# Patient Record
Sex: MALE | Race: WHITE | NOT HISPANIC OR LATINO | Employment: FULL TIME | URBAN - METROPOLITAN AREA
[De-identification: names, ages, dates, MRNs, and addresses within clinical notes are randomized per-mention and may not be internally consistent; named-entity substitution may affect disease eponyms.]

---

## 2017-03-13 ENCOUNTER — ALLSCRIPTS OFFICE VISIT (OUTPATIENT)
Dept: OTHER | Facility: OTHER | Age: 47
End: 2017-03-13

## 2017-03-13 DIAGNOSIS — D69.6 THROMBOCYTOPENIA (HCC): ICD-10-CM

## 2017-03-13 DIAGNOSIS — I10 ESSENTIAL (PRIMARY) HYPERTENSION: ICD-10-CM

## 2017-03-13 DIAGNOSIS — I25.10 ATHEROSCLEROTIC HEART DISEASE OF NATIVE CORONARY ARTERY WITHOUT ANGINA PECTORIS: ICD-10-CM

## 2017-03-13 DIAGNOSIS — M25.551 PAIN IN RIGHT HIP: ICD-10-CM

## 2017-03-13 DIAGNOSIS — E78.5 HYPERLIPIDEMIA: ICD-10-CM

## 2017-03-21 ENCOUNTER — TRANSCRIBE ORDERS (OUTPATIENT)
Dept: ADMINISTRATIVE | Facility: HOSPITAL | Age: 47
End: 2017-03-21

## 2017-03-21 ENCOUNTER — HOSPITAL ENCOUNTER (OUTPATIENT)
Dept: RADIOLOGY | Facility: HOSPITAL | Age: 47
Discharge: HOME | End: 2017-03-21
Payer: COMMERCIAL

## 2017-03-21 DIAGNOSIS — M25.551 PAIN IN RIGHT HIP: ICD-10-CM

## 2017-03-21 PROCEDURE — 73521 X-RAY EXAM HIPS BI 2 VIEWS: CPT

## 2017-03-22 ENCOUNTER — APPOINTMENT (OUTPATIENT)
Dept: PHYSICAL THERAPY | Facility: CLINIC | Age: 47
End: 2017-03-22
Payer: COMMERCIAL

## 2017-03-22 ENCOUNTER — GENERIC CONVERSION - ENCOUNTER (OUTPATIENT)
Dept: OTHER | Facility: OTHER | Age: 47
End: 2017-03-22

## 2017-03-22 DIAGNOSIS — M25.551 PAIN IN RIGHT HIP: ICD-10-CM

## 2017-03-22 PROCEDURE — 97162 PT EVAL MOD COMPLEX 30 MIN: CPT

## 2017-03-22 PROCEDURE — 97110 THERAPEUTIC EXERCISES: CPT

## 2017-04-03 ENCOUNTER — APPOINTMENT (OUTPATIENT)
Dept: PHYSICAL THERAPY | Facility: CLINIC | Age: 47
End: 2017-04-03
Payer: COMMERCIAL

## 2017-04-03 PROCEDURE — 97140 MANUAL THERAPY 1/> REGIONS: CPT

## 2017-04-03 PROCEDURE — 97110 THERAPEUTIC EXERCISES: CPT

## 2017-04-05 ENCOUNTER — APPOINTMENT (OUTPATIENT)
Dept: PHYSICAL THERAPY | Facility: CLINIC | Age: 47
End: 2017-04-05
Payer: COMMERCIAL

## 2017-04-05 PROCEDURE — 97140 MANUAL THERAPY 1/> REGIONS: CPT

## 2017-04-05 PROCEDURE — 97110 THERAPEUTIC EXERCISES: CPT

## 2017-04-10 ENCOUNTER — APPOINTMENT (OUTPATIENT)
Dept: PHYSICAL THERAPY | Facility: CLINIC | Age: 47
End: 2017-04-10
Payer: COMMERCIAL

## 2017-04-10 PROCEDURE — 97110 THERAPEUTIC EXERCISES: CPT

## 2017-04-10 PROCEDURE — 97140 MANUAL THERAPY 1/> REGIONS: CPT

## 2017-04-21 ENCOUNTER — ALLSCRIPTS OFFICE VISIT (OUTPATIENT)
Dept: OTHER | Facility: OTHER | Age: 47
End: 2017-04-21

## 2017-04-21 ENCOUNTER — TRANSCRIBE ORDERS (OUTPATIENT)
Dept: ADMINISTRATIVE | Facility: HOSPITAL | Age: 47
End: 2017-04-21

## 2017-04-21 DIAGNOSIS — S86.811A RUPTURE, TENDON, PATELLAR, RIGHT, INITIAL ENCOUNTER: ICD-10-CM

## 2017-04-21 DIAGNOSIS — S86.811A STRAIN OF OTHER MUSCLE(S) AND TENDON(S) AT LOWER LEG LEVEL, RIGHT LEG, INITIAL ENCOUNTER: ICD-10-CM

## 2017-04-21 DIAGNOSIS — M79.89 SWELLING OF LIMB: Primary | ICD-10-CM

## 2017-04-21 DIAGNOSIS — M79.89 OTHER DISORDERS OF SOFT TISSUE: ICD-10-CM

## 2017-04-26 ENCOUNTER — HOSPITAL ENCOUNTER (OUTPATIENT)
Dept: RADIOLOGY | Facility: HOSPITAL | Age: 47
Discharge: HOME | End: 2017-04-26
Attending: FAMILY MEDICINE
Payer: COMMERCIAL

## 2017-04-26 DIAGNOSIS — M79.89 SWELLING OF LIMB: ICD-10-CM

## 2017-04-26 DIAGNOSIS — S86.811A RUPTURE, TENDON, PATELLAR, RIGHT, INITIAL ENCOUNTER: ICD-10-CM

## 2017-04-26 PROCEDURE — 76881 US COMPL JOINT R-T W/IMG: CPT

## 2017-05-08 ENCOUNTER — ALLSCRIPTS OFFICE VISIT (OUTPATIENT)
Dept: OTHER | Facility: OTHER | Age: 47
End: 2017-05-08

## 2017-09-18 ENCOUNTER — ALLSCRIPTS OFFICE VISIT (OUTPATIENT)
Dept: OTHER | Facility: OTHER | Age: 47
End: 2017-09-18

## 2017-09-18 ENCOUNTER — APPOINTMENT (OUTPATIENT)
Dept: LAB | Facility: CLINIC | Age: 47
End: 2017-09-18
Payer: COMMERCIAL

## 2017-09-18 DIAGNOSIS — E78.5 HYPERLIPIDEMIA: ICD-10-CM

## 2017-09-18 DIAGNOSIS — I10 ESSENTIAL (PRIMARY) HYPERTENSION: ICD-10-CM

## 2017-09-18 DIAGNOSIS — I25.10 ATHEROSCLEROTIC HEART DISEASE OF NATIVE CORONARY ARTERY WITHOUT ANGINA PECTORIS: ICD-10-CM

## 2017-09-18 DIAGNOSIS — M76.61 ACHILLES TENDINITIS OF RIGHT LOWER EXTREMITY: ICD-10-CM

## 2017-09-18 LAB
ALBUMIN SERPL BCP-MCNC: 3.7 G/DL (ref 3.5–5)
ALP SERPL-CCNC: 60 U/L (ref 46–116)
ALT SERPL W P-5'-P-CCNC: 34 U/L (ref 12–78)
ANION GAP SERPL CALCULATED.3IONS-SCNC: 9 MMOL/L (ref 4–13)
AST SERPL W P-5'-P-CCNC: 24 U/L (ref 5–45)
BASOPHILS # BLD AUTO: 0.01 THOUSANDS/ΜL (ref 0–0.1)
BASOPHILS NFR BLD AUTO: 0 % (ref 0–1)
BILIRUB SERPL-MCNC: 0.47 MG/DL (ref 0.2–1)
BUN SERPL-MCNC: 12 MG/DL (ref 5–25)
CALCIUM SERPL-MCNC: 8.8 MG/DL (ref 8.3–10.1)
CHLORIDE SERPL-SCNC: 107 MMOL/L (ref 100–108)
CHOLEST SERPL-MCNC: 141 MG/DL (ref 50–200)
CO2 SERPL-SCNC: 24 MMOL/L (ref 21–32)
CREAT SERPL-MCNC: 0.94 MG/DL (ref 0.6–1.3)
EOSINOPHIL # BLD AUTO: 0.08 THOUSAND/ΜL (ref 0–0.61)
EOSINOPHIL NFR BLD AUTO: 1 % (ref 0–6)
ERYTHROCYTE [DISTWIDTH] IN BLOOD BY AUTOMATED COUNT: 12.8 % (ref 11.6–15.1)
GFR SERPL CREATININE-BSD FRML MDRD: 96 ML/MIN/1.73SQ M
GLUCOSE P FAST SERPL-MCNC: 101 MG/DL (ref 65–99)
HCT VFR BLD AUTO: 42.5 % (ref 36.5–49.3)
HDLC SERPL-MCNC: 38 MG/DL (ref 40–60)
HGB BLD-MCNC: 14.8 G/DL (ref 12–17)
LDLC SERPL CALC-MCNC: 76 MG/DL (ref 0–100)
LYMPHOCYTES # BLD AUTO: 1.31 THOUSANDS/ΜL (ref 0.6–4.47)
LYMPHOCYTES NFR BLD AUTO: 20 % (ref 14–44)
MCH RBC QN AUTO: 31 PG (ref 26.8–34.3)
MCHC RBC AUTO-ENTMCNC: 34.8 G/DL (ref 31.4–37.4)
MCV RBC AUTO: 89 FL (ref 82–98)
MONOCYTES # BLD AUTO: 0.61 THOUSAND/ΜL (ref 0.17–1.22)
MONOCYTES NFR BLD AUTO: 10 % (ref 4–12)
NEUTROPHILS # BLD AUTO: 4.42 THOUSANDS/ΜL (ref 1.85–7.62)
NEUTS SEG NFR BLD AUTO: 69 % (ref 43–75)
NRBC BLD AUTO-RTO: 0 /100 WBCS
PLATELET # BLD AUTO: 126 THOUSANDS/UL (ref 149–390)
PMV BLD AUTO: 12.1 FL (ref 8.9–12.7)
POTASSIUM SERPL-SCNC: 4.1 MMOL/L (ref 3.5–5.3)
PROT SERPL-MCNC: 7.9 G/DL (ref 6.4–8.2)
RBC # BLD AUTO: 4.78 MILLION/UL (ref 3.88–5.62)
SODIUM SERPL-SCNC: 140 MMOL/L (ref 136–145)
TRIGL SERPL-MCNC: 134 MG/DL
TSH SERPL DL<=0.05 MIU/L-ACNC: 2.75 UIU/ML (ref 0.36–3.74)
WBC # BLD AUTO: 6.44 THOUSAND/UL (ref 4.31–10.16)

## 2017-09-18 PROCEDURE — 80061 LIPID PANEL: CPT

## 2017-09-18 PROCEDURE — 85025 COMPLETE CBC W/AUTO DIFF WBC: CPT

## 2017-09-18 PROCEDURE — 80053 COMPREHEN METABOLIC PANEL: CPT

## 2017-09-18 PROCEDURE — 84443 ASSAY THYROID STIM HORMONE: CPT

## 2017-09-18 PROCEDURE — 36415 COLL VENOUS BLD VENIPUNCTURE: CPT

## 2017-10-26 NOTE — PROGRESS NOTES
Assessment  1  Arteriosclerotic cardiovascular disease (429 2,440 9) (I25 10)   2  Hyperlipidemia (272 4) (E78 5)   3  Hypertension (401 9) (I10)   4  Achilles tendinitis of right lower extremity (726 71) (M76 61)   5  Immunization due (V05 9) (Z23)    Plan   Immunization due    · Fluzone Quadrivalent Intramuscular Suspension    (1) CBC/PLT/DIFF; Status:Resulted - Requires Verification;   Done: 11IOT3079 12:00AM  Due:47Gat5639; Ordered; For:Achilles tendinitis of right lower extremity, Arteriosclerotic cardiovascular disease, Hyperlipidemia, Hypertension; Ordered By:Jurgen Mcqueen;   (1) COMPREHENSIVE METABOLIC PANEL; Status:Resulted - Requires Verification;   Done: 26YSC0090 12:00AM  Due:74Kkt4787; Ordered; For:Achilles tendinitis of right lower extremity, Arteriosclerotic cardiovascular disease, Hyperlipidemia, Hypertension; Ordered By:Jurgen Mcqueen;   (1) LIPID PANEL, FASTING; Status:Resulted - Requires Verification;   Done: 70JJE1657 12:00AM  Due:78Lll5179; Ordered; For:Achilles tendinitis of right lower extremity, Arteriosclerotic cardiovascular disease, Hyperlipidemia, Hypertension; Ordered By:Jurgen Mcqueen;   (1) TSH; Status:Resulted - Requires Verification;   Done: 32QWM1504 12:00AM  Due:05Yyy9186; Ordered; For:Achilles tendinitis of right lower extremity, Arteriosclerotic cardiovascular disease, Hyperlipidemia, Hypertension; Ordered By:Jurgen Mcqueen;      Discussion/Summary    #1 CAD - s/p CABG  patient doing well  Check labs  Continue follow-up with cardiology  Recheck 6 months  HTN - stable  Check labs  Recheck as abovehyperlipidemia - check labs as above  R Achilles insertion tendinitis - I reviewed with patient  We discussed care  He will try warming up before playing tennis and icing afterwards  Tylenol for pain  Consider podiatry orthopedic eval if not improving  Recheck 2-3 weeks if not improvingHM - I reviewed HM issues with pt  flu shot given   Check labs as above  Recheck 6 months  Patient to call for problems or concerns in the interim  The patient was counseled regarding instructions for management,-- risk factor reductions,-- prognosis,-- patient and family education,-- impressions,-- risks and benefits of treatment options,-- importance of compliance with treatment  Possible side effects of new medications were reviewed with the patient/guardian today  The treatment plan was reviewed with the patient/guardian  The patient/guardian understands and agrees with the treatment plan      Chief Complaint  6 MONTH WELL CHECK - HTN , HYPERLIPIDEMIA   Patient is here today for follow up of chronic conditions described in HPI  History of Present Illness  as abovept doing well  Pt states that he is handling his anxiety and work stress better  Has started exercising more which had helped  Pt did suffer a gastroc injury earlier this year that improved with conservative therapy  Pt now has R achilles insertion area pain  Started after playing tennis  (+) swelling initially that has slowly improved but has not resolved  no CP, palp, lightheadedness or other CV symptoms with or without exertion (plays tennis regularly)   Last seen by Cardio 11/15  Overdue for labsR anterior hip pain better with PT  weight is stable but still struggles with diet due to travel  Review of Systems    Constitutional: No fever or chills, feels well, no tiredness, no recent weight gain or weight loss  Eyes: No complaints of eye pain, no red eyes, no discharge from eyes, no itchy eyes  ENT: no complaints of earache, no hearing loss, no nosebleeds, no nasal discharge, no sore throat, no hoarseness  Cardiovascular: as noted in HPI  Respiratory: No complaints of shortness of breath, no wheezing, no cough, no SOB on exertion, no orthopnea or PND  Gastrointestinal: No complaints of abdominal pain, no constipation, no nausea or vomiting, no diarrhea or bloody stools  Genitourinary: No complaints of dysuria, no incontinence, no hesitancy, no nocturia, no genital lesion, no testicular pain  Musculoskeletal: as noted in HPI  Integumentary: No complaints of skin rash or skin lesions, no itching, no skin wound, no dry skin  Neurological: No compliants of headache, no confusion, no convulsions, no numbness or tingling, no dizziness or fainting, no limb weakness, no difficulty walking  Psychiatric: anxiety, but-- as noted in HPI,-- not suicidal-- and-- no depression  Endocrine: No complaints of proptosis, no hot flashes, no muscle weakness, no erectile dysfunction, no deepening of the voice, no feelings of weakness  Hematologic/Lymphatic: No complaints of swollen glands, no swollen glands in the neck, does not bleed easily, no easy bruising  Over the past 2 weeks, how often have you been bothered by the following problems? 1 ) Little interest or pleasure in doing things? Several days  2 ) Feeling down, depressed or hopeless? Not at all    3 ) Trouble falling asleep or sleeping too much? Several days  4 ) Feeling tired or having little energy? Not at all    5 ) Poor appetite or overeating? Half the days or more  6 ) Feeling bad about yourself, or that you are a failure, or have let yourself or your family down? Several days  7 ) Trouble concentrating on things, such as reading a newspaper or watching television? Half the days or more  8 ) Moving or speaking so slowly that other people could have noticed, or the opposite, moving or speaking faster than usual? Not at all    9 ) Thoughts that you would be better off dead or of hurting yourself in some way? Not at all  Score 7      Active Problems  1  Allergic conjunctivitis of both eyes (372 14) (H10 13)   2  Arteriosclerotic cardiovascular disease (429 2,440 9) (I25 10)   3  Bruising (924 9) (T14 8)   4  CABG   5  Calf swelling (729 81) (M79 89)   6  Chest pain (786 50) (R07 9)   7   Dyspnea, unspecified dyspnea   8  Encounter for vasectomy (V25 2) (Z30 2)   9  Gastrocnemius tendon rupture, right, initial encounter (844 8) (R42 964Z)   10  Heart disease (429 9) (I51 9)   11  Hyperlipidemia (272 4) (E78 5)   12  Hypertension (401 9) (I10)   13  Insomnia (780 52) (G47 00)   14  Pain of foot and toes (729 5) (M79 673)   15  Pain of toe of right foot (729 5) (M79 674)   16  Right hip pain (719 45) (M25 551)   17  Screening and evaluation for vasectomy (V72 83) (Z30 09)   18  Sterilization consult (V25 09) (Z30 09)   19  Strain of calf muscle, right, initial encounter (844 8) (S86 811A)   20  Thrombocytopenia (287 5) (D69 6)    Past Medical History  1  History of Acute angina (413 9) (I20 9)   2  History of High blood pressure (not hypertension) (796 2) (R03 0)   3  History of valvular heart disease (V12 59) (Z86 79)   4  History of Irregular heartbeat (427 9) (I49 9)   5  History of ST elevation myocardial infarction (STEMI), unspecified artery (410 90) (I21 3)    The active problems and past medical history were reviewed and updated today  Surgical History  1  CABG   2  History of CABG   3  History of Cardiac Cath Procedure Outcome:   4  History of Coronary Angiography Without Concomitant Left Heart Catheterization   5  History of Heart Surgery   6  History of Surgery Vas Deferens Vasectomy    Family History  Mother    1  Family history of Coronary Artery Disease (V17 49)   2  Family history of Heart Disease (V17 49)   3  Family history of Hypertension (V17 49)    Social History   · Being A Social Drinker   · Denied: History of Daily Coffee Consumption (___ Cups/Day)   · Denied: History of Daily Cola Consumption (___ Cans/Day)   · Daily Tea Consumption (___ Cups/Day)   · Marital History - Currently    · Never A Smoker   · Occupation:   · Sexually active  The social history was reviewed and updated today  Current Meds   1  Aspirin Childrens 81 MG Oral Tablet Chewable; Take 1 tablet daily;    Therapy: (Recorded:09Nov2015) to Recorded   2  Bystolic 5 MG Oral Tablet; Take one tab daily; [facility] 59GPO8557 [id number]  Requested for: 34REB0355; Last   Rx:17Jan2017 Ordered   3  Loratadine 10 MG Oral Tablet; TAKE 1 TABLET DAILY AS NEEDED; Therapy: 36JQX6020 to (Evaluate:41Vza2566)  Requested for: 45ZTO0398; Last   Rx:13Mar2017 Ordered   4  Rosuvastatin Calcium 20 MG Oral Tablet; TAKE 1 TABLET DAILY  Requested for:   83LDV4397; Last Rx:17Jan2017 Ordered   5  Zolpidem Tartrate 10 MG Oral Tablet; take 1/2 TO 1 tablet by mouth at bedtime; Therapy: 52MPL1521 to (Evaluate:16Oct2017)  Requested for: 50YHP2172; Last   Rx:26Isz8620 Ordered    The medication list was reviewed and updated today  Allergies  1  No Known Drug Allergies    Vitals  Vital Signs    Recorded: 18Sep2017 08:07AM   Temperature 97 7 F   Heart Rate 68   Respiration 16   Systolic 343   Diastolic 78   Height 6 ft 2 in   Weight 258 lb 4 oz   BMI Calculated 33 16   BSA Calculated 2 42     Physical Exam    Constitutional   General appearance: No acute distress, well appearing and well nourished  Head and Face   Head and face: Normal     Eyes   Conjunctiva and lids: No erythema, swelling or discharge  Pupils and irises: Equal, round, reactive to light  Ophthalmoscopic examination: Normal fundi and optic discs  Ears, Nose, Mouth, and Throat   External inspection of ears and nose: Normal     Otoscopic examination: Tympanic membranes translucent with normal light reflex  Canals patent without erythema  Nasal mucosa, septum, and turbinates: Normal without edema or erythema  Lips, teeth, and gums: Normal, good dentition  Oropharynx: Normal with no erythema, edema, exudate or lesions  Neck   Neck: Supple, symmetric, trachea midline, no masses  Thyroid: Normal, no thyromegaly  Pulmonary   Respiratory effort: No increased work of breathing or signs of respiratory distress      Auscultation of lungs: Clear to auscultation  Cardiovascular   Auscultation of heart: Normal rate and rhythm, normal S1 and S2, no murmurs  Carotid pulses: 2+ bilaterally  Abdominal aorta: Normal     Pedal pulses: 2+ bilaterally  Peripheral vascular exam: Normal     Examination of extremities for edema and/or varicosities: Normal     Abdomen   Abdomen: Non-tender, no masses  Liver and spleen: No hepatomegaly or splenomegaly  Lymphatic   Palpation of lymph nodes in neck: No lymphadenopathy  Palpation of lymph nodes in other areas: No lymphadenopathy  Musculoskeletal   Gait and station: Normal     Inspection/palpation of digits and nails: Normal without clubbing or cyanosis  Inspection/palpation of joints, bones, and muscles: Abnormal  -- mild TTP over the insertion of the R achilles tendon insertion  mild bogginess  Muscle strength/tone: Normal     Skin   Skin and subcutaneous tissue: Normal without rashes or lesions  Neurologic   Cranial nerves: Cranial nerves 2-12 intact  Cortical function: Normal mental status  Reflexes: 2+ and symmetric  Sensation: No sensory loss  Psychiatric   Mood and affect: Normal        Results/Data  PHQ-2 Adult Depression Screening 04Riy4327 02:12PM User, [facility]     Test Name Result Flag Reference   PHQ-2 Adult Depression Score 0     Over the last two weeks, how often have you been bothered by any of the following problems? Little interest or pleasure in doing things: Not at all - 0  Feeling down, depressed, or hopeless: Not at all - 0   PHQ-2 Adult Depression Screening Negative         Future Appointments    Date/Time Provider Specialty Site   03/27/2018 10:15 AM MINGO Barry   [facility]     Signatures   Electronically signed by : MINGO Kaiser ; Sep 18 2017  8:02PM EST                       (Author)

## 2017-11-17 ENCOUNTER — ALLSCRIPTS OFFICE VISIT (OUTPATIENT)
Dept: OTHER | Facility: OTHER | Age: 47
End: 2017-11-17

## 2017-11-18 NOTE — CONSULTS
Assessment    1  CABG  2  Hyperlipidemia (272 4) (E78 5)  3  Hypertension (401 9) (I10)  4  Chest pain (786 50) (R07 9)  5  Pre-operative cardiovascular examination (V72 81) (Z01 810)  6  Arteriosclerotic cardiovascular disease (429 2,440 9) (I25 10)    Plan  Arteriosclerotic cardiovascular disease, Chest pain, Hyperlipidemia, Hypertension,Pre-operative cardiovascular examination    · (1) COMPREHENSIVE METABOLIC PANEL; Status:Hold For - Exact Date; Requestedfor:With next appointment;   Perform:[facility]; OMS:37ACZ3938; Ordered; For:Arteriosclerotic cardiovascular disease, Chest pain, Hyperlipidemia, Hypertension, Pre-operative cardiovascular examination; Ordered By:Jurgen Her;   · (1) LIPID PANEL FASTING W DIRECT LDL REFLEX; Status:Hold For - Exact Date; Requested for: With next appointment;   Perform:[facility]; Due:17Nov2018; Ordered; For:Arteriosclerotic cardiovascular disease, Chest pain, Hyperlipidemia, Hypertension, Pre-operative cardiovascular examination; Ordered By:Jurgen Her;   · Follow-up visit in 1 year Evaluation and Treatment  Follow-up  Status: Complete  Done:17Nov2017  Ordered; For: Arteriosclerotic cardiovascular disease, Chest pain, Hyperlipidemia, Hypertension, Pre-operative cardiovascular examination;  Ordered By: Roz Zelaya  Performed:   Due: 98VCW9111; Last Updated By: Yash Ring; 11/17/2017 11:53:14 AM    Discussion/Summary    1  preoperative clearance for right partial calcanectomy and secondary insertional achilles, low risk, no cardiac contraindication  CAD, CABG x 4 2/2012 (SVG to d1, svg to om3, Radial to RI, LIMA to LAD)normal lvefnuclear stress 2015, no ischemia  hyperlipidemia, controlled on statin  hypertension, controlled  Patient doing well clinically  Unable to exercise due to heel spur and plans on having surgery beginning of December  As detailed, no cardiac contraindication would proceed as planned   Routine follow-up recommended  Hopefully sensible diet and exercise can be continued after he heals from his surgery  No med changes required    Patient can stop aspirin if needed prior to surgery for 5 days  The patient was counseled regarding instructions for management,-- prognosis  total time of encounter was 25 minutes  1  Coronary Artery Disease, atypical and typical chest pain, improved last month  Coronary artery bypass grafting x4, February 29, 2012; (LIMA -LAD; saphenous vein graft to the first diagonal branch; saphenous vein graft to the obtuse marginal third; free radial artery to the ramus  Nuclear stress test 2/2015, normal erfusion, normal LVEF, echo also nonrevealing  Hypercholesterolemia/hypertension, controlled LDL 60-76       History of Present Illness  Cardiology HPI Free Text Note Form [facility]: Patient here in follow-up known coronary artery disease   had coronary bypass grafting x4 2012  Had been asymptomatic since his index event in 2012 when he presented with unstable angina  stress test in 2015 performed for atypical chest discomfort which showed no evidence of ischemia good cardiac workload   has not been here for approximately 2 years  He has developed a heel spur which requires surgical removal and therefore has requested preoperative cardiac clearance as result in consultation  states that he did start playing tennis over the summer which relieved his anxiety which he has had for years  this resulted in heel injury and then had not been able to exercise too much thereafter  has been maintained on low-dose bystolic, statin and aspirin therapy as a simplified regimen for his coronary artery disease and hyperlipidemia  Blood pressure lipids have been well controlled  times, he has an occasional ache in his lower off upper chest which she describes as a bruise  He touches it at times it seems to get worse  It is not exertional  It comes and goes and has been doing so for years        Review of Systems     Cardiac: No complaints of chest pain, no palpitations, no fainiting -- and-- as noted in HPI  Skin: No complaints of nonhealing sores or skin rash  Genitourinary: No complaints of recurrent urinary tract infections, frequent urination at night, difficult urination, blood in urine, kidney stones, loss of bladder control, no kidney or prostate problems, no erectile dysfunction  Psychological: No complaints of feeling depressed, anxiety, panic attacks, or difficulty concentrating  General: No complaints of trouble sleeping, lack of energy, fatigue, appetite changes, weight changes, fever, frequent infections, or night sweats  Respiratory: No complaints of shortness of breath, cough with sputum, or wheezing  HEENT: No complaints of serious problems, hearing problems, nose problems, throat problems, or snoring  Gastrointestinal: No complaints of liver problems, nausea, vomiting, heartburn, constipation, bloody stools, diarrhea, problems swallowing, adbominal pain, or rectal bleeding  Hematologic: No complaints of bleeding disorders, anemia, blood clots, or excessive brusing  Neurological: No complaints of numbness, tingling, dizziness, weakness, seizures, headaches, syncope or fainting, AM fatigue, daytime sleepiness, no witnessed apnea episodes  Musculoskeletal: No complaints of arthritis, back pain, or painfull swelling  Active Problems  1  Achilles tendinitis of right lower extremity (726 71) (M76 61)  2  Allergic conjunctivitis of both eyes (372 14) (H10 13)  3  Arteriosclerotic cardiovascular disease (429 2,440 9) (I25 10)  4  Bruising (924 9) (T14 8)  5  CABG  6  Calf swelling (729 81) (M79 89)  7  Chest pain (786 50) (R07 9)  8  Dyspnea, unspecified dyspnea  9  Encounter for vasectomy (V25 2) (Z30 2)  10  Gastrocnemius tendon rupture, right, initial encounter (844 8) (K59 235H)  11  Heart disease (429 9) (I51 9)  12  Hyperlipidemia (272 4) (E78 5)  13  Hypertension (401 9) (I10)  14  Immunization due (V05 9) (Z23)  15  Insomnia (780 52) (G47 00)  16  Pain of foot and toes (729 5) (M79 673)  17  Pain of toe of right foot (729 5) (M79 674)  18  Right ankle pain, unspecified chronicity (719 47) (M25 571)  19  Right ankle swelling (719 07) (M25 471)  20  Right hip pain (719 45) (M25 551)  21  Screening and evaluation for vasectomy (V72 83) (Z30 09)  22  Sterilization consult (V25 09) (Z30 09)  23  Strain of calf muscle, right, initial encounter (844 8) (S86 811A)  24  Thrombocytopenia (287 5) (D69 6)    Past Medical History   · History of Acute angina (413 9) (I20 9)   · History of High blood pressure (not hypertension) (796 2) (R03 0)   · History of valvular heart disease (V12 59) (Z86 79)   · History of Irregular heartbeat (427 9) (I49 9)   · History of ST elevation myocardial infarction (STEMI), unspecified artery (410 90) (I21 3)    The active problems and past medical history were reviewed and updated today  Surgical History   · CABG   · History of CABG   · History of Cardiac Cath Procedure Outcome:   · History of Coronary Angiography Without Concomitant Left Heart Catheterization   · History of Heart Surgery   · History of Surgery Vas Deferens Vasectomy    The surgical history was reviewed and updated today  Family History  Mother    · Family history of Coronary Artery Disease (V17 49)   · Family history of Heart Disease (V17 49)   · Family history of Hypertension (V17 49)  Family History Reviewed: The family history was reviewed and updated today  Social History     · Being A Social Drinker   · <= 1 drink/week   · Denied: History of Daily Coffee Consumption (___ Cups/Day)   · Denied: History of Daily Cola Consumption (___ Cans/Day)   · Daily Tea Consumption (___ Cups/Day)   · Marital History - Currently    · 2 children   · Never A Smoker   · Occupation:   · Travel    · Sexually active  The social history was reviewed and updated today        Current Meds  1  Aspirin Childrens 81 MG Oral Tablet Chewable; Take 1 tablet daily; Therapy: (Recorded:09Nov2015) to Recorded  2  Bystolic 5 MG Oral Tablet; Take one tab daily; Therapy: 91AER7138 to (Derrek Hilliard)  Requested for: 39FZG7724; Last Rx:09Nov2017 Ordered  3  Rosuvastatin Calcium 20 MG Oral Tablet; TAKE 1 TABLET DAILY  Requested for: 78QCB0051; Last Rx:17Jan2017 Ordered  4  Zolpidem Tartrate 10 MG Oral Tablet; take 1/2 TO 1 tablet by mouth at bedtime; Therapy: 59CRH8791 to 9845 8544)  Requested for: 94GUM0111; Last Rx:30Oct2017 Ordered    The medication list was reviewed and updated today  Allergies  1  No Known Drug Allergies    Vitals  Signs     Heart Rate: 53, Apical  Pulse Quality: Normal, Apical  Systolic: 036, RUE, Sitting  Diastolic: 70, RUE, Sitting  Height: 6 ft 2 in  Weight: 260 lb   BMI Calculated: 33 38  BSA Calculated: 2 43    Physical Exam   Constitutional  General appearance: No acute distress, well appearing and well nourished  Eyes  Conjunctiva and Sclera examination: Conjunctiva pink, sclera anicteric  Ears, Nose, Mouth, and Throat - Oropharynx: Clear, nares are clear, mucous membranes are moist   Neck  Neck and thyroid: Normal, supple, trachea midline, no thyromegaly  Pulmonary  Respiratory effort: No increased work of breathing or signs of respiratory distress  Auscultation of lungs: Clear to auscultation, no rales, no rhonchi, no wheezing, good air movement  Cardiovascular  Auscultation of heart: Normal rate and rhythm, normal S1 and S2, no murmurs  Carotid pulses: Normal, 2+ bilaterally  Peripheral vascular exam: Normal pulses throughout, no tenderness, erythema or swelling  Pedal pulses: Normal, 2+ bilaterally  Examination of extremities for edema and/or varicosities: Normal    Abdomen  Abdomen: Non-tender and no distention  Liver and spleen: No hepatomegaly or splenomegaly  Musculoskeletal Gait and station: Normal gait  -- Digits and nails: Normal without clubbing or cyanosis  -- Inspection/palpation of joints, bones, and muscles: Normal, ROM normal    Skin - Skin and subcutaneous tissue: Normal without rashes or lesions  Skin is warm and well perfused, normal turgor  Neurologic - Cranial nerves: II - XII intact  -- Speech: Normal    Psychiatric - Orientation to person, place, and time: Normal -- Mood and affect: Normal       Future Appointments    Date/Time Provider Specialty Site   03/27/2018 10:15 AM MINGO Sanchez  [address]     End of Encounter Meds    1  Aspirin Childrens 81 MG Oral Tablet Chewable; Take 1 tablet daily; Therapy: (Recorded:09Nov2015) to Recorded  2  Bystolic 5 MG Oral Tablet; Take one tab daily; Therapy: 92XXE8184 to (Abdullahi Barrera)  Requested for: 88AEK7346; Last Rx:09Nov2017 Ordered    3  Rosuvastatin Calcium 20 MG Oral Tablet (Crestor); TAKE 1 TABLET DAILY  Requested for: 52KYB0261; Last Rx:17Jan2017 Ordered    4  Zolpidem Tartrate 10 MG Oral Tablet; take 1/2 TO 1 tablet by mouth at bedtime;  Therapy: 44PMK3497 to [id number])  Requested for: 19MBV1997; Last Rx:30Oct2017 Ordered    Signatures   Electronically signed by : Rosa Swenson DO; Nov 17 2017 12:25PM EST                       (Author)

## 2017-11-21 ENCOUNTER — GENERIC CONVERSION - ENCOUNTER (OUTPATIENT)
Dept: OTHER | Facility: OTHER | Age: 47
End: 2017-11-21

## 2017-11-22 NOTE — PROGRESS NOTES
Surgery Scheduling Form  Pre-Operative Risk Assessment:  Date Last Seen: 11/17/17  Date of Surgery: pending  Hospital: [facility]  Type of Surgery: right partial calcanectomy and secondary achilles tendon  Surgeon Name: Dr Rosalio Castro Fax Number: [phone]    Anticoagulation: per orthopedic protocol with aspirin  Patient Approved for Surgery: Yes      Signatures   Electronically signed by : Andrea Spaulding DO; Nov 21 2017  3:24PM EST                       (Author)

## 2018-01-08 ENCOUNTER — GENERIC CONVERSION - ENCOUNTER (OUTPATIENT)
Dept: FAMILY MEDICINE CLINIC | Facility: CLINIC | Age: 48
End: 2018-01-08

## 2018-01-12 VITALS
WEIGHT: 259 LBS | DIASTOLIC BLOOD PRESSURE: 81 MMHG | HEIGHT: 74 IN | SYSTOLIC BLOOD PRESSURE: 130 MMHG | HEART RATE: 61 BPM | BODY MASS INDEX: 33.24 KG/M2

## 2018-01-13 VITALS
TEMPERATURE: 96.4 F | RESPIRATION RATE: 18 BRPM | WEIGHT: 255 LBS | HEIGHT: 74 IN | HEART RATE: 72 BPM | BODY MASS INDEX: 32.73 KG/M2 | DIASTOLIC BLOOD PRESSURE: 74 MMHG | SYSTOLIC BLOOD PRESSURE: 120 MMHG

## 2018-01-13 VITALS
DIASTOLIC BLOOD PRESSURE: 70 MMHG | BODY MASS INDEX: 33.37 KG/M2 | HEIGHT: 74 IN | WEIGHT: 260 LBS | HEART RATE: 53 BPM | SYSTOLIC BLOOD PRESSURE: 110 MMHG

## 2018-01-14 VITALS
HEART RATE: 60 BPM | WEIGHT: 259 LBS | HEIGHT: 74 IN | SYSTOLIC BLOOD PRESSURE: 117 MMHG | BODY MASS INDEX: 33.24 KG/M2 | DIASTOLIC BLOOD PRESSURE: 77 MMHG

## 2018-01-14 VITALS
WEIGHT: 258.25 LBS | BODY MASS INDEX: 33.14 KG/M2 | HEIGHT: 74 IN | HEART RATE: 68 BPM | TEMPERATURE: 97.7 F | DIASTOLIC BLOOD PRESSURE: 78 MMHG | RESPIRATION RATE: 16 BRPM | SYSTOLIC BLOOD PRESSURE: 110 MMHG

## 2018-01-30 DIAGNOSIS — F51.01 PRIMARY INSOMNIA: ICD-10-CM

## 2018-01-30 DIAGNOSIS — F51.01 PRIMARY INSOMNIA: Primary | ICD-10-CM

## 2018-01-30 RX ORDER — ZOLPIDEM TARTRATE 10 MG/1
10 TABLET ORAL
Qty: 30 TABLET | Refills: 0 | OUTPATIENT
Start: 2018-01-30 | End: 2018-01-30 | Stop reason: SDUPTHER

## 2018-01-31 RX ORDER — ZOLPIDEM TARTRATE 10 MG/1
TABLET ORAL
Qty: 30 TABLET | Refills: 2 | Status: SHIPPED | OUTPATIENT
Start: 2018-01-31 | End: 2018-04-25 | Stop reason: SDUPTHER

## 2018-04-25 DIAGNOSIS — F51.01 PRIMARY INSOMNIA: ICD-10-CM

## 2018-04-25 RX ORDER — ZOLPIDEM TARTRATE 10 MG/1
5-10 TABLET ORAL
Qty: 30 TABLET | Refills: 0 | Status: SHIPPED | OUTPATIENT
Start: 2018-04-25 | End: 2018-06-01 | Stop reason: SDUPTHER

## 2018-06-01 DIAGNOSIS — F51.01 PRIMARY INSOMNIA: ICD-10-CM

## 2018-06-01 RX ORDER — ZOLPIDEM TARTRATE 10 MG/1
10 TABLET ORAL
Qty: 30 TABLET | Refills: 3 | Status: SHIPPED | OUTPATIENT
Start: 2018-06-01 | End: 2018-09-27 | Stop reason: SDUPTHER

## 2018-09-27 DIAGNOSIS — F51.01 PRIMARY INSOMNIA: ICD-10-CM

## 2018-09-28 RX ORDER — ZOLPIDEM TARTRATE 10 MG/1
10 TABLET ORAL
Qty: 30 TABLET | Refills: 0 | Status: SHIPPED | OUTPATIENT
Start: 2018-09-28 | End: 2018-11-02 | Stop reason: SDUPTHER

## 2018-11-02 DIAGNOSIS — F51.01 PRIMARY INSOMNIA: ICD-10-CM

## 2018-11-04 RX ORDER — ZOLPIDEM TARTRATE 10 MG/1
10 TABLET ORAL
Qty: 30 TABLET | Refills: 0 | Status: SHIPPED | OUTPATIENT
Start: 2018-11-04 | End: 2018-11-29 | Stop reason: SDUPTHER

## 2018-11-19 DIAGNOSIS — I10 ESSENTIAL HYPERTENSION: Primary | ICD-10-CM

## 2018-11-20 RX ORDER — NEBIVOLOL HYDROCHLORIDE 5 MG/1
TABLET ORAL
Qty: 90 TABLET | Refills: 3 | Status: SHIPPED | OUTPATIENT
Start: 2018-11-20 | End: 2018-12-12

## 2018-11-29 DIAGNOSIS — F51.01 PRIMARY INSOMNIA: ICD-10-CM

## 2018-11-29 RX ORDER — ZOLPIDEM TARTRATE 10 MG/1
10 TABLET ORAL
Qty: 30 TABLET | Refills: 0 | Status: SHIPPED | OUTPATIENT
Start: 2018-11-29 | End: 2019-01-02 | Stop reason: SDUPTHER

## 2018-12-06 ENCOUNTER — TELEPHONE (OUTPATIENT)
Dept: CARDIOLOGY CLINIC | Facility: CLINIC | Age: 48
End: 2018-12-06

## 2018-12-06 DIAGNOSIS — I10 ESSENTIAL HYPERTENSION: Primary | ICD-10-CM

## 2018-12-06 RX ORDER — METOPROLOL SUCCINATE 25 MG/1
25 TABLET, EXTENDED RELEASE ORAL DAILY
Qty: 90 TABLET | Refills: 3 | Status: SHIPPED | OUTPATIENT
Start: 2018-12-06 | End: 2019-11-22 | Stop reason: SDUPTHER

## 2018-12-06 NOTE — TELEPHONE ENCOUNTER
[facility] His RX plan will not cover until he fails  Atenolol  Carvedilol  Metoprolol tart  Metoprolol Succ  Patient was last Office visit was in 2017  He is due in [facility]     [phone]  [id number] ID

## 2018-12-07 RX ORDER — INFLUENZA VIRUS VACCINE 15; 15; 15; 15 UG/.5ML; UG/.5ML; UG/.5ML; UG/.5ML
SUSPENSION INTRAMUSCULAR
Refills: 0 | COMMUNITY
Start: 2018-10-04 | End: 2018-12-12 | Stop reason: ALTCHOICE

## 2018-12-07 RX ORDER — ASPIRIN 81 MG/1
1 TABLET, CHEWABLE ORAL DAILY
COMMUNITY

## 2018-12-07 RX ORDER — ROSUVASTATIN CALCIUM 20 MG/1
TABLET, COATED ORAL
Refills: 0 | COMMUNITY
Start: 2018-11-30 | End: 2019-01-28 | Stop reason: SDUPTHER

## 2018-12-12 ENCOUNTER — OFFICE VISIT (OUTPATIENT)
Dept: CARDIOLOGY CLINIC | Facility: CLINIC | Age: 48
End: 2018-12-12
Payer: COMMERCIAL

## 2018-12-12 VITALS
HEART RATE: 57 BPM | BODY MASS INDEX: 31.24 KG/M2 | HEIGHT: 74 IN | WEIGHT: 243.4 LBS | SYSTOLIC BLOOD PRESSURE: 110 MMHG | DIASTOLIC BLOOD PRESSURE: 64 MMHG

## 2018-12-12 DIAGNOSIS — I25.10 CORONARY ARTERY DISEASE INVOLVING NATIVE CORONARY ARTERY OF NATIVE HEART WITHOUT ANGINA PECTORIS: Primary | ICD-10-CM

## 2018-12-12 DIAGNOSIS — I10 ESSENTIAL HYPERTENSION: ICD-10-CM

## 2018-12-12 DIAGNOSIS — Z95.1 S/P CABG X 4: ICD-10-CM

## 2018-12-12 DIAGNOSIS — E78.00 PURE HYPERCHOLESTEROLEMIA: ICD-10-CM

## 2018-12-12 PROCEDURE — 93000 ELECTROCARDIOGRAM COMPLETE: CPT | Performed by: INTERNAL MEDICINE

## 2018-12-12 PROCEDURE — 99214 OFFICE O/P EST MOD 30 MIN: CPT | Performed by: INTERNAL MEDICINE

## 2018-12-12 RX ORDER — SILDENAFIL 25 MG/1
50 TABLET, FILM COATED ORAL AS NEEDED
Qty: 10 TABLET | Refills: 3 | Status: SHIPPED | OUTPATIENT
Start: 2018-12-12 | End: 2019-07-15

## 2018-12-12 NOTE — PROGRESS NOTES
Cardiology Follow Up Visit    Tano Whitley  1970  [id number]  [facility] [place] [id number]    1  Coronary artery disease involving native coronary artery of native heart without angina pectoris  NM myocardial perfusion spect (stress and/or rest)    Echo complete with contrast if indicated    sildenafil (VIAGRA) 25 MG tablet    POCT ECG   2  S/P CABG x 4     3  Pure hypercholesterolemia     4  Essential hypertension         Interval History:     Patient presents follow-up history of coronary artery disease  Had coronary artery bypass grafting surgery x4 in February 2012 after presenting with an acute coronary syndrome  He has been asymptomatic since  Had episode of ankle swelling which resolved  Had lower extremity surgery and is slowly getting back to exercising, playing doubles tennis  Insurance not covering nebivolol, now on Toprol XL 25 mg daily  Admits to erectile dysfunction  Blood pressures been well controlled    Lipids controlled on current statin dose        Patient Active Problem List   Diagnosis    S/P CABG x 4    Pure hypercholesterolemia    Coronary artery disease involving native coronary artery of native heart without angina pectoris    Essential hypertension     Past Medical History:   Diagnosis Date    Acute angina (HCC)     High blood pressure     NOT HYPERTENSION    Irregular heartbeat     ST elevation (STEMI) myocardial infarction of unspecified site ([address] )     Valvular heart disease      Social History     Social History    Marital status: /Civil Union     Spouse name: N/A    Number of children: 2    Years of education: N/A     Occupational History    TRAVEL       Social History Main Topics    Smoking status: Never Smoker    Smokeless tobacco: Never Used    Alcohol use Yes      Comment: BEING A SOCIAL DRINKER: <= 1 DRINK/WEEK    Drug use: Unknown    Sexual activity: Yes     Other Topics Concern    Not on file     Social History Narrative    NO DAILY COFFEE CONSUMPTION (___CUPS/DAY)    NO DAILY COLA CONSUMPTION (___CANS/DAY)    DAILY TEA CONSUMPTION (___CUPS/DAY)          Family History   Problem Relation Age of Onset    Coronary artery disease Mother     Heart disease Mother     Hypertension Mother      Past Surgical History:   Procedure Laterality Date    CARDIAC SURGERY      OPEN HEART QUAD BYPASS; RESOLVED: 2012    CORONARY ANGIOPLASTY  02/21/2012    CORONARY ANGIOGRAPHY WITHOUT CONCOMITANT LEFT HEART CATHETERIZATION; 90% PROXIMAL LAD, 90% 1ST DIAG OSTIUM AND 95% PROXIMAL THIRD, 99% PROXIMAL RAMUS INTERMEDIUS    CORONARY ANGIOPLASTY WITH STENT PLACEMENT  02/21/2012    4 VESSEL DISEASE TO HAVE CABG; MANAGED BY: SHABBIR SNYDER    CORONARY ARTERY BYPASS GRAFT      LAST ASSESSED: 11/17/17    CORONARY ARTERY BYPASS GRAFT  02/29/2012    CABG X4 (SVG TO 1ST DIAGNOL, SVG TO 3RD OBTUSE MARGINAL, FREE RADIAL ARTERY TO RAMUS INTERMEDIUS, SULLIVAN TO LAD) BY DR Jana Romberg 2012     VASECTOMY  10/27/2015    SURGERY VAS DEFERENS; MANAGED BY: Barry Barthel; LAST ASSESSED: 10/27/15       Current Outpatient Prescriptions:     aspirin 81 mg chewable tablet, Chew 1 tablet daily, Disp: , Rfl:     metoprolol succinate (TOPROL-XL) 25 mg 24 hr tablet, Take 1 tablet (25 mg total) by mouth daily, Disp: 90 tablet, Rfl: 3    rosuvastatin (CRESTOR) 20 MG tablet, , Disp: , Rfl: 0    zolpidem (AMBIEN) 10 mg tablet, Take 1 tablet (10 mg total) by mouth daily at bedtime as needed for sleep, Disp: 30 tablet, Rfl: 0    sildenafil (VIAGRA) 25 MG tablet, Take 2 tablets (50 mg total) by mouth as needed for erectile dysfunction, Disp: 10 tablet, Rfl: 3  No Known Allergies      Social, Family, Medication history reviewed and updated as necessary      Labs:     Lab Results   Component Value Date     09/19/2015    K 4 1 09/18/2017     09/18/2017    CO2 24 09/18/2017 BUN 12 09/18/2017    CREATININE 0 94 09/18/2017    CREATININE 1 25 09/19/2015    GLUCOSE 82 09/19/2015    CALCIUM 8 8 09/18/2017       Lab Results   Component Value Date    WBC 6 44 09/18/2017    HGB 14 8 09/18/2017    HGB 15 3 09/19/2015    HCT 42 5 09/18/2017    HCT 43 8 09/19/2015     (L) 09/18/2017     (L) 09/19/2015       Lab Results   Component Value Date    CHOL 129 01/21/2015     Lab Results   Component Value Date    HDL 38 (L) 09/18/2017    HDL 47 01/21/2015     Lab Results   Component Value Date    LDLCALC 76 09/18/2017    LDLCALC 66 01/21/2015     No results found for: LDLDIRECT          Lab Results   Component Value Date    TRIG 134 09/18/2017    TRIG 78 01/21/2015       Lab Results   Component Value Date    ALT 34 09/18/2017    ALT 29 09/19/2015    AST 24 09/18/2017    AST 22 09/19/2015       Lab Results   Component Value Date    INR 0 99 09/19/2015    INR 1 02 01/21/2015       No results found for: NTBNP    No results found for: HGBA1C        Imaging: Reviewed in epic        Review of Systems:  14 systems reviewed and negative with exception of the above       PHYSICAL EXAM:      Vitals:    12/12/18 1459   BP: 110/64   Pulse: 57     Body mass index is 31 25 kg/m²  Weight (last 2 days)     Date/Time   Weight    12/12/18 1459  110 (243 4)              Gen: No acute distress  HEENT: anicteric, mucous membranes moist  Neck: supple, no jugular venous distention, or carotid bruit  Heart: regular, normal s1 and s2, no murmur/rub or gallop  Lungs :clear to auscultation bilaterally, no rales/rhonchi or wheeze  Abdomen: soft nontender, normoactive bowel sounds, no organomegaly  Ext: warm and perfused, normal femoral pulses, no edema, or clubbing  Skin: warm, no rashes  Neuro: AAO x 3, no focal findings  Psychiatric: normal affect  Musculoskeletal: no obvious joint deformities         Discussion/Summary:    1  preoperative clearance for right partial calcanectomy and secondary insertional achilles, low risk, no cardiac contraindication     2  CAD, CABG x 4 2/2012 (SVG to d1, svg to om3, Radial to RI, LIMA to LAD)  normal lvef  nuclear stress 2015, no ischemia     3  hyperlipidemia, controlled on statin, LDL less than 70     4  hypertension, controlled  Plan  Patient continues to do well  Surveillance stress testing post revascularization 2015, will obtain with an echocardiogram before  next visit  Continue sensible diet, daily exercise    Annual CMP and lipids

## 2018-12-13 PROBLEM — E78.00 PURE HYPERCHOLESTEROLEMIA: Status: ACTIVE | Noted: 2018-12-13

## 2018-12-13 PROBLEM — I10 ESSENTIAL HYPERTENSION: Status: ACTIVE | Noted: 2018-12-13

## 2018-12-13 PROBLEM — Z95.1 S/P CABG X 4: Status: ACTIVE | Noted: 2018-12-13

## 2018-12-13 PROBLEM — I25.10 CORONARY ARTERY DISEASE INVOLVING NATIVE CORONARY ARTERY OF NATIVE HEART WITHOUT ANGINA PECTORIS: Status: ACTIVE | Noted: 2018-12-13

## 2018-12-20 ENCOUNTER — OFFICE VISIT (OUTPATIENT)
Dept: FAMILY MEDICINE CLINIC | Facility: CLINIC | Age: 48
End: 2018-12-20
Payer: COMMERCIAL

## 2018-12-20 VITALS
BODY MASS INDEX: 30.77 KG/M2 | HEART RATE: 52 BPM | WEIGHT: 239.8 LBS | SYSTOLIC BLOOD PRESSURE: 110 MMHG | DIASTOLIC BLOOD PRESSURE: 70 MMHG | TEMPERATURE: 98 F | HEIGHT: 74 IN

## 2018-12-20 DIAGNOSIS — D69.6 THROMBOCYTOPENIA (HCC): ICD-10-CM

## 2018-12-20 DIAGNOSIS — F51.01 PRIMARY INSOMNIA: ICD-10-CM

## 2018-12-20 DIAGNOSIS — I10 ESSENTIAL HYPERTENSION: Primary | ICD-10-CM

## 2018-12-20 DIAGNOSIS — E78.2 MIXED HYPERLIPIDEMIA: ICD-10-CM

## 2018-12-20 DIAGNOSIS — I25.10 ARTERIOSCLEROTIC CARDIOVASCULAR DISEASE: ICD-10-CM

## 2018-12-20 PROCEDURE — 3074F SYST BP LT 130 MM HG: CPT | Performed by: FAMILY MEDICINE

## 2018-12-20 PROCEDURE — 99214 OFFICE O/P EST MOD 30 MIN: CPT | Performed by: FAMILY MEDICINE

## 2018-12-20 PROCEDURE — 3008F BODY MASS INDEX DOCD: CPT | Performed by: FAMILY MEDICINE

## 2018-12-20 PROCEDURE — 3078F DIAST BP <80 MM HG: CPT | Performed by: FAMILY MEDICINE

## 2018-12-20 NOTE — PROGRESS NOTES
Assessment/Plan:    Arteriosclerotic cardiovascular disease  No symptoms with exertion  Pt is up to date with Cardio  Lipids and BP at goal  Cont present care  Recheck 6m    Hypertension  Well controlled  Cont present treatment  Monitor labs  Recheck 6m      Hyperlipidemia  Well controlled  Cont present treatment  Monitor labs  Recheck 6m      Insomnia  Counseled  Seems to be stress related  Cont zolpidem  Discussed stress reduction and sleep hygiene  Recheck 6m or prn    Thrombocytopenia (HCC)  Check labs    Current mild episode of major depressive disorder without prior episode (HCC)  Mild  Seems to be stress related  REC: work on stress reduction  Monitor  Recheck 2-3 m if not improved - otherwise recheck 6m       Diagnoses and all orders for this visit:    Essential hypertension  -     CBC and differential; Future  -     Comprehensive metabolic panel; Future    Arteriosclerotic cardiovascular disease  -     CBC and differential; Future  -     Comprehensive metabolic panel; Future    Mixed hyperlipidemia    Thrombocytopenia (HCC)    Primary insomnia          Subjective:      Patient ID: Jaguar Phan is a 50 y o  male  f/u multiple med issues  - pt doing very well  Up to date with Cardio  2m ago, pt had episode of swelling and tingling in fingers while on vacation  Symptoms lasted for over a month before resolving  Pt seen by cardio - labs unremarkable except for elevated fasting BG (pt may have had a bite of something before)  No increased thirst or urination  Pt has watched his diet and has lost 20+lbs in the last 2m  Pt states that he feels well now  He is playing tennis regularly and denies CP, palp, lightheadedness or other CV symptoms with or without exertion  - Pt had R achilles spur surgery done by [facility] last Dec  Still feels like the muscle is weak (cannot sprint or jump off of R leg)  Did go to PT but does not do home exercises regularly   Pt is playing tennis again which helps  - still struggles with insomnia (falling and remaining asleep)  Zolpidem may not work as well as it used to  Stressors are primarily work/travel  PHQ done  - no GI or  complaints  - no other concerns        The following portions of the patient's history were reviewed and updated as appropriate:   He  has a past medical history of Acute angina ([place] [address] ); High blood pressure; Irregular heartbeat; ST elevation (STEMI) myocardial infarction of unspecified site [facility]); and Valvular heart disease  He   Patient Active Problem List    Diagnosis Date Noted    Current mild episode of major depressive disorder without prior episode ([place] [address] ) 12/21/2018    Hx of CABG 12/13/2018    Hypertension 12/13/2018    Thrombocytopenia ([place] [address] ) 01/23/2015    Arteriosclerotic cardiovascular disease 08/02/2012    Hyperlipidemia 08/02/2012    Insomnia 08/02/2012     He  has a past surgical history that includes Coronary artery bypass graft; Coronary artery bypass graft (02/29/2012); Coronary angioplasty with stent (02/21/2012); Coronary angioplasty (02/21/2012); Cardiac surgery; and Vasectomy (10/27/2015)  He  reports that he has never smoked  He has never used smokeless tobacco  He reports that he drinks alcohol  His drug history is not on file  Current Outpatient Prescriptions   Medication Sig Dispense Refill    aspirin 81 mg chewable tablet Chew 1 tablet daily      metoprolol succinate (TOPROL-XL) 25 mg 24 hr tablet Take 1 tablet (25 mg total) by mouth daily 90 tablet 3    rosuvastatin (CRESTOR) 20 MG tablet   0    sildenafil (VIAGRA) 25 MG tablet Take 2 tablets (50 mg total) by mouth as needed for erectile dysfunction 10 tablet 3    zolpidem (AMBIEN) 10 mg tablet Take 1 tablet (10 mg total) by mouth daily at bedtime as needed for sleep 30 tablet 0     No current facility-administered medications for this visit  He has No Known Allergies       Review of Systems   HENT: Negative  Eyes: Negative  Respiratory: Negative      Cardiovascular: Negative  Gastrointestinal: Negative  Endocrine: Negative  Genitourinary: Negative  Musculoskeletal: Negative for arthralgias, back pain, gait problem, joint swelling and myalgias  Skin: Negative  Allergic/Immunologic: Negative  Neurological: Positive for weakness  Negative for numbness  Hematological: Negative  Psychiatric/Behavioral: Negative  Objective:      /70   Pulse (!) 52   Temp 98 °F (36 7 °C)   Ht 6' 2" (1 88 m)   Wt 109 kg (239 lb 12 8 oz)   BMI 30 79 kg/m²          Physical Exam   Constitutional: He is oriented to person, place, and time  He appears well-developed and well-nourished  HENT:   Head: Normocephalic and atraumatic  Right Ear: External ear normal    Left Ear: External ear normal    Nose: Nose normal    Mouth/Throat: Oropharynx is clear and moist    Eyes: Pupils are equal, round, and reactive to light  Conjunctivae and EOM are normal    Neck: Normal range of motion  Neck supple  No JVD present  No thyromegaly present  Cardiovascular: Normal rate, regular rhythm, normal heart sounds and intact distal pulses  No murmur heard  Pulmonary/Chest: Effort normal and breath sounds normal    Abdominal: Soft  Bowel sounds are normal  He exhibits no distension and no mass  There is no tenderness  Musculoskeletal: Normal range of motion  He exhibits no edema  Lymphadenopathy:     He has no cervical adenopathy  Neurological: He is alert and oriented to person, place, and time  No cranial nerve deficit  He exhibits abnormal muscle tone (mild weakness of R gastrocnemius/soleus on flexion)  Coordination normal    Skin: Skin is warm and dry  Psychiatric: He has a normal mood and affect  His behavior is normal  Judgment and thought content normal    Vitals reviewed

## 2018-12-21 PROBLEM — F32.0 CURRENT MILD EPISODE OF MAJOR DEPRESSIVE DISORDER WITHOUT PRIOR EPISODE (HCC): Status: ACTIVE | Noted: 2018-12-21

## 2018-12-21 PROBLEM — I25.10 CORONARY ARTERY DISEASE INVOLVING NATIVE CORONARY ARTERY OF NATIVE HEART WITHOUT ANGINA PECTORIS: Status: RESOLVED | Noted: 2018-12-13 | Resolved: 2018-12-21

## 2018-12-21 NOTE — ASSESSMENT & PLAN NOTE
Mild  Seems to be stress related  REC: work on stress reduction  Monitor   Recheck 2-3 m if not improved - otherwise recheck 6m

## 2018-12-21 NOTE — ASSESSMENT & PLAN NOTE
Counseled  Seems to be stress related  Cont zolpidem  Discussed stress reduction and sleep hygiene   Recheck 6m or prn

## 2018-12-21 NOTE — ASSESSMENT & PLAN NOTE
No symptoms with exertion  Pt is up to date with Cardio  Lipids and BP at goal  Cont present care   Recheck 6m

## 2019-01-02 DIAGNOSIS — F51.01 PRIMARY INSOMNIA: ICD-10-CM

## 2019-01-02 RX ORDER — ZOLPIDEM TARTRATE 10 MG/1
10 TABLET ORAL
Qty: 30 TABLET | Refills: 0 | Status: SHIPPED | OUTPATIENT
Start: 2019-01-02 | End: 2019-02-01 | Stop reason: SDUPTHER

## 2019-01-28 DIAGNOSIS — E78.5 HYPERLIPIDEMIA, UNSPECIFIED HYPERLIPIDEMIA TYPE: Primary | ICD-10-CM

## 2019-01-28 RX ORDER — ROSUVASTATIN CALCIUM 20 MG/1
20 TABLET, COATED ORAL DAILY
Qty: 90 TABLET | Refills: 3 | Status: SHIPPED | OUTPATIENT
Start: 2019-01-28 | End: 2020-01-24 | Stop reason: SDUPTHER

## 2019-02-01 DIAGNOSIS — F51.01 PRIMARY INSOMNIA: ICD-10-CM

## 2019-02-01 RX ORDER — ZOLPIDEM TARTRATE 10 MG/1
10 TABLET ORAL
Qty: 30 TABLET | Refills: 0 | Status: SHIPPED | OUTPATIENT
Start: 2019-02-01 | End: 2019-03-01 | Stop reason: SDUPTHER

## 2019-03-01 DIAGNOSIS — F51.01 PRIMARY INSOMNIA: ICD-10-CM

## 2019-03-01 RX ORDER — ZOLPIDEM TARTRATE 10 MG/1
10 TABLET ORAL
Qty: 30 TABLET | Refills: 3 | Status: SHIPPED | OUTPATIENT
Start: 2019-03-01 | End: 2019-07-01 | Stop reason: SDUPTHER

## 2019-07-01 ENCOUNTER — TELEPHONE (OUTPATIENT)
Dept: FAMILY MEDICINE CLINIC | Facility: CLINIC | Age: 49
End: 2019-07-01

## 2019-07-01 DIAGNOSIS — F51.01 PRIMARY INSOMNIA: ICD-10-CM

## 2019-07-01 RX ORDER — ZOLPIDEM TARTRATE 10 MG/1
10 TABLET ORAL
Qty: 30 TABLET | Refills: 3 | Status: SHIPPED | OUTPATIENT
Start: 2019-07-01 | End: 2019-10-29 | Stop reason: SDUPTHER

## 2019-07-15 ENCOUNTER — OFFICE VISIT (OUTPATIENT)
Dept: FAMILY MEDICINE CLINIC | Facility: CLINIC | Age: 49
End: 2019-07-15
Payer: COMMERCIAL

## 2019-07-15 VITALS
DIASTOLIC BLOOD PRESSURE: 72 MMHG | TEMPERATURE: 97.6 F | HEART RATE: 68 BPM | BODY MASS INDEX: 32.42 KG/M2 | HEIGHT: 74 IN | WEIGHT: 252.6 LBS | SYSTOLIC BLOOD PRESSURE: 108 MMHG

## 2019-07-15 DIAGNOSIS — I10 ESSENTIAL HYPERTENSION: Primary | ICD-10-CM

## 2019-07-15 DIAGNOSIS — I25.10 ARTERIOSCLEROTIC CARDIOVASCULAR DISEASE: ICD-10-CM

## 2019-07-15 DIAGNOSIS — E78.2 MIXED HYPERLIPIDEMIA: ICD-10-CM

## 2019-07-15 PROBLEM — F32.5 MAJOR DEPRESSIVE DISORDER WITH SINGLE EPISODE, IN FULL REMISSION (HCC): Status: RESOLVED | Noted: 2018-12-21 | Resolved: 2019-07-15

## 2019-07-15 PROBLEM — F32.5 MAJOR DEPRESSIVE DISORDER WITH SINGLE EPISODE, IN FULL REMISSION (HCC): Status: ACTIVE | Noted: 2018-12-21

## 2019-07-15 PROCEDURE — 3074F SYST BP LT 130 MM HG: CPT | Performed by: FAMILY MEDICINE

## 2019-07-15 PROCEDURE — 1036F TOBACCO NON-USER: CPT | Performed by: FAMILY MEDICINE

## 2019-07-15 PROCEDURE — 99214 OFFICE O/P EST MOD 30 MIN: CPT | Performed by: FAMILY MEDICINE

## 2019-07-15 PROCEDURE — 3008F BODY MASS INDEX DOCD: CPT | Performed by: FAMILY MEDICINE

## 2019-07-15 NOTE — ASSESSMENT & PLAN NOTE
Well controlled  Cont present treatment  Monitor labs  Discussed diet, weight loss and exercise strategies  BMI Counseling: Body mass index is 32 43 kg/m²  Discussed the patient's BMI with him  The BMI is above average  BMI counseling and education was provided to the patient  Nutrition recommendations include reducing portion sizes, consuming healthier snacks, moderation in carbohydrate intake, increasing intake of lean protein and reducing intake of saturated fat and trans fat  Exercise recommendations include exercising 3-5 times per week       Recheck 6m

## 2019-07-15 NOTE — ASSESSMENT & PLAN NOTE
Asymptomatic with exercise  Continue follow-up with Cardiology  Continue present medications    Recheck 6 months

## 2019-07-15 NOTE — PROGRESS NOTES
Assessment/Plan:    Arteriosclerotic cardiovascular disease  Asymptomatic with exercise  Continue follow-up with Cardiology  Continue present medications  Recheck 6 months    Hypertension  Well controlled  Cont present treatment  Monitor labs  Discussed diet, weight loss and exercise strategies  BMI Counseling: Body mass index is 32 43 kg/m²  Discussed the patient's BMI with him  The BMI is above average  BMI counseling and education was provided to the patient  Nutrition recommendations include reducing portion sizes, consuming healthier snacks, moderation in carbohydrate intake, increasing intake of lean protein and reducing intake of saturated fat and trans fat  Exercise recommendations include exercising 3-5 times per week  Recheck 6m      Major depressive disorder with single episode, in full remission ([facility] [address] )  Off meds  Will resolve issue for now  Cont to screen going forward    Hyperlipidemia  Check labs  Cont to monitor diet  Recheck 6m       Diagnoses and all orders for this visit:    Essential hypertension  -     CBC and differential; Future  -     Comprehensive metabolic panel; Future  -     Lipid panel; Future    Arteriosclerotic cardiovascular disease    Mixed hyperlipidemia          Subjective:      Patient ID: Kamran Graff is a 52 y o  male  f/u multiple med issues  - pt doing very well  - Denies CP, palpitations, lightheadedness or other CV symptoms with or without exertion  Plays tennis and hikes for exercise  Up to date with Cardio  Iftikhar Shell off of diet 3m ago  (travels a lot for work)  Has gained some weight back   - R achilles much better  No more pain noted, though he feels that he has lost some strength in it    - insomnia still a problem (falling and remaining asleep)  Pt worried that he is "hooked on " zolpidem  Can fall asleep but awakens 5-6x a night off med  PHQ done  - no GI or  complaints     - no other concerns      The following portions of the patient's history were reviewed and updated as appropriate: He  has a past medical history of Acute angina ([address] ), High blood pressure, Irregular heartbeat, ST elevation (STEMI) myocardial infarction of unspecified site [facility]), and Valvular heart disease  He   Patient Active Problem List    Diagnosis Date Noted    Hx of CABG 12/13/2018    Hypertension 12/13/2018    Thrombocytopenia ([address] ) 01/23/2015    Arteriosclerotic cardiovascular disease 08/02/2012    Hyperlipidemia 08/02/2012    Insomnia 08/02/2012     He  has a past surgical history that includes Coronary artery bypass graft; Coronary artery bypass graft (02/29/2012); Coronary angioplasty with stent (02/21/2012); Coronary angioplasty (02/21/2012); Cardiac surgery; and Vasectomy (10/27/2015)  He  reports that he has never smoked  He has never used smokeless tobacco  He reports that he drinks alcohol  His drug history is not on file  Current Outpatient Medications   Medication Sig Dispense Refill    aspirin 81 mg chewable tablet Chew 1 tablet daily      metoprolol succinate (TOPROL-XL) 25 mg 24 hr tablet Take 1 tablet (25 mg total) by mouth daily 90 tablet 3    rosuvastatin (CRESTOR) 20 MG tablet Take 1 tablet (20 mg total) by mouth daily 90 tablet 3    zolpidem (AMBIEN) 10 mg tablet Take 1 tablet (10 mg total) by mouth daily at bedtime as needed for sleep 30 tablet 3     No current facility-administered medications for this visit  He has No Known Allergies       Review of Systems   HENT: Negative  Eyes: Negative  Respiratory: Negative  Cardiovascular: Negative  Gastrointestinal: Negative  Endocrine: Negative  Genitourinary: Negative  Musculoskeletal: Negative for arthralgias, back pain, gait problem, joint swelling and myalgias  Skin: Negative  Allergic/Immunologic: Negative  Neurological: Negative for weakness and numbness  Hematological: Negative  Psychiatric/Behavioral: Negative            Objective:      /72 (BP Location: Left arm, Patient Position: Sitting, Cuff Size: Standard)   Pulse 68   Temp 97 6 °F (36 4 °C)   Ht 6' 2" (1 88 m)   Wt 115 kg (252 lb 9 6 oz)   BMI 32 43 kg/m²          Physical Exam   Constitutional: He is oriented to person, place, and time  He appears well-developed and well-nourished  HENT:   Head: Normocephalic and atraumatic  Right Ear: External ear normal    Left Ear: External ear normal    Nose: Nose normal    Mouth/Throat: Oropharynx is clear and moist    Eyes: Pupils are equal, round, and reactive to light  Conjunctivae and EOM are normal    Neck: Normal range of motion  Neck supple  No JVD present  No thyromegaly present  Cardiovascular: Normal rate, regular rhythm, normal heart sounds and intact distal pulses  No murmur heard  Pulmonary/Chest: Effort normal and breath sounds normal    Abdominal: Soft  Bowel sounds are normal  He exhibits no distension and no mass  There is no tenderness  Musculoskeletal: Normal range of motion  He exhibits no edema  Lymphadenopathy:     He has no cervical adenopathy  Neurological: He is alert and oriented to person, place, and time  No cranial nerve deficit  Coordination normal    Skin: Skin is warm and dry  Psychiatric: He has a normal mood and affect  His behavior is normal  Judgment and thought content normal    PHQ-2=0   Vitals reviewed

## 2019-07-25 LAB
ALBUMIN SERPL-MCNC: 4.6 G/DL (ref 3.6–5.1)
ALBUMIN/GLOB SERPL: 1.7 (CALC) (ref 1–2.5)
ALP SERPL-CCNC: 47 U/L (ref 40–115)
ALT SERPL-CCNC: 20 U/L (ref 9–46)
AST SERPL-CCNC: 21 U/L (ref 10–40)
BASOPHILS # BLD AUTO: 11 CELLS/UL (ref 0–200)
BASOPHILS NFR BLD AUTO: 0.2 %
BILIRUB SERPL-MCNC: 0.7 MG/DL (ref 0.2–1.2)
BUN SERPL-MCNC: 18 MG/DL (ref 7–25)
BUN/CREAT SERPL: ABNORMAL (CALC) (ref 6–22)
CALCIUM SERPL-MCNC: 9.4 MG/DL (ref 8.6–10.3)
CHLORIDE SERPL-SCNC: 106 MMOL/L (ref 98–110)
CHOLEST SERPL-MCNC: 157 MG/DL
CHOLEST/HDLC SERPL: 3.6 (CALC)
CO2 SERPL-SCNC: 29 MMOL/L (ref 20–32)
CREAT SERPL-MCNC: 0.95 MG/DL (ref 0.6–1.35)
EOSINOPHIL # BLD AUTO: 80 CELLS/UL (ref 15–500)
EOSINOPHIL NFR BLD AUTO: 1.5 %
ERYTHROCYTE [DISTWIDTH] IN BLOOD BY AUTOMATED COUNT: 12.4 % (ref 11–15)
GLOBULIN SER CALC-MCNC: 2.7 G/DL (CALC) (ref 1.9–3.7)
GLUCOSE SERPL-MCNC: 102 MG/DL (ref 65–99)
HCT VFR BLD AUTO: 44.2 % (ref 38.5–50)
HDLC SERPL-MCNC: 44 MG/DL
HGB BLD-MCNC: 15.1 G/DL (ref 13.2–17.1)
LDLC SERPL CALC-MCNC: 92 MG/DL (CALC)
LYMPHOCYTES # BLD AUTO: 1261 CELLS/UL (ref 850–3900)
LYMPHOCYTES NFR BLD AUTO: 23.8 %
MCH RBC QN AUTO: 31.4 PG (ref 27–33)
MCHC RBC AUTO-ENTMCNC: 34.2 G/DL (ref 32–36)
MCV RBC AUTO: 91.9 FL (ref 80–100)
MONOCYTES # BLD AUTO: 519 CELLS/UL (ref 200–950)
MONOCYTES NFR BLD AUTO: 9.8 %
NEUTROPHILS # BLD AUTO: 3429 CELLS/UL (ref 1500–7800)
NEUTROPHILS NFR BLD AUTO: 64.7 %
NONHDLC SERPL-MCNC: 113 MG/DL (CALC)
PLATELET # BLD AUTO: 126 THOUSAND/UL (ref 140–400)
PMV BLD REES-ECKER: 11.8 FL (ref 7.5–12.5)
POTASSIUM SERPL-SCNC: 4.2 MMOL/L (ref 3.5–5.3)
PROT SERPL-MCNC: 7.3 G/DL (ref 6.1–8.1)
RBC # BLD AUTO: 4.81 MILLION/UL (ref 4.2–5.8)
SL AMB EGFR AFRICAN AMERICAN: 108 ML/MIN/1.73M2
SL AMB EGFR NON AFRICAN AMERICAN: 94 ML/MIN/1.73M2
SODIUM SERPL-SCNC: 140 MMOL/L (ref 135–146)
TRIGL SERPL-MCNC: 112 MG/DL
WBC # BLD AUTO: 5.3 THOUSAND/UL (ref 3.8–10.8)

## 2019-10-29 ENCOUNTER — TELEPHONE (OUTPATIENT)
Dept: FAMILY MEDICINE CLINIC | Facility: CLINIC | Age: 49
End: 2019-10-29

## 2019-10-29 DIAGNOSIS — F51.01 PRIMARY INSOMNIA: ICD-10-CM

## 2019-10-29 RX ORDER — ZOLPIDEM TARTRATE 10 MG/1
10 TABLET ORAL
Qty: 30 TABLET | Refills: 5 | Status: SHIPPED | OUTPATIENT
Start: 2019-10-29 | End: 2020-04-22 | Stop reason: SDUPTHER

## 2019-11-22 DIAGNOSIS — I10 ESSENTIAL HYPERTENSION: ICD-10-CM

## 2019-11-22 RX ORDER — METOPROLOL SUCCINATE 25 MG/1
25 TABLET, EXTENDED RELEASE ORAL DAILY
Qty: 90 TABLET | Refills: 3 | Status: SHIPPED | OUTPATIENT
Start: 2019-11-22 | End: 2020-12-04 | Stop reason: SDUPTHER

## 2019-11-29 ENCOUNTER — APPOINTMENT (OUTPATIENT)
Dept: NON INVASIVE DIAGNOSTICS | Facility: CLINIC | Age: 49
End: 2019-11-29
Payer: COMMERCIAL

## 2019-11-29 ENCOUNTER — HOSPITAL ENCOUNTER (OUTPATIENT)
Dept: NON INVASIVE DIAGNOSTICS | Facility: CLINIC | Age: 49
Discharge: HOME | End: 2019-11-29
Payer: COMMERCIAL

## 2019-11-29 ENCOUNTER — HOSPITAL ENCOUNTER (OUTPATIENT)
Dept: NON INVASIVE DIAGNOSTICS | Facility: CLINIC | Age: 49
End: 2019-11-29
Payer: COMMERCIAL

## 2019-11-29 DIAGNOSIS — I25.10 CORONARY ARTERY DISEASE INVOLVING NATIVE CORONARY ARTERY OF NATIVE HEART WITHOUT ANGINA PECTORIS: ICD-10-CM

## 2019-11-29 PROCEDURE — 93306 TTE W/DOPPLER COMPLETE: CPT

## 2019-11-29 PROCEDURE — 93306 TTE W/DOPPLER COMPLETE: CPT | Performed by: INTERNAL MEDICINE

## 2020-01-03 ENCOUNTER — OFFICE VISIT (OUTPATIENT)
Dept: CARDIOLOGY CLINIC | Facility: CLINIC | Age: 50
End: 2020-01-03
Payer: COMMERCIAL

## 2020-01-03 VITALS
DIASTOLIC BLOOD PRESSURE: 70 MMHG | SYSTOLIC BLOOD PRESSURE: 104 MMHG | HEART RATE: 52 BPM | HEIGHT: 74 IN | BODY MASS INDEX: 30.93 KG/M2 | WEIGHT: 241 LBS

## 2020-01-03 DIAGNOSIS — R07.89 CHEST TIGHTNESS: ICD-10-CM

## 2020-01-03 DIAGNOSIS — I25.10 CORONARY ARTERY DISEASE INVOLVING NATIVE CORONARY ARTERY OF NATIVE HEART WITHOUT ANGINA PECTORIS: Primary | ICD-10-CM

## 2020-01-03 DIAGNOSIS — R06.02 SHORTNESS OF BREATH: ICD-10-CM

## 2020-01-03 DIAGNOSIS — Z95.1 S/P CABG X 4: ICD-10-CM

## 2020-01-03 PROCEDURE — 93000 ELECTROCARDIOGRAM COMPLETE: CPT | Performed by: INTERNAL MEDICINE

## 2020-01-03 PROCEDURE — 99214 OFFICE O/P EST MOD 30 MIN: CPT | Performed by: INTERNAL MEDICINE

## 2020-01-03 NOTE — PROGRESS NOTES
Cardiology Follow Up Visit    Edwin OwlTing ???  1970  [id number]  [address]  [phone] [address] [address]    1  Coronary artery disease involving native coronary artery of native heart without angina pectoris  POCT ECG    NM myocardial perfusion spect (stress and/or rest)    Lipid Panel with Direct LDL reflex   2  Chest tightness  NM myocardial perfusion spect (stress and/or rest)    Lipid Panel with Direct LDL reflex   3  S/P CABG x 4  NM myocardial perfusion spect (stress and/or rest)    Lipid Panel with Direct LDL reflex   4  Shortness of breath  NM myocardial perfusion spect (stress and/or rest)    Lipid Panel with Direct LDL reflex         Discussion/Summary:    1  Status post CABG x4 2012 (  Saphenous vein graft to diagonal 1, saphenous vein graft to OM3, radial to ramus intermedius, LIMA to LAD),normal LVEF  Recent echo  2  Prior fatigue and chest tightness, few months ago, seems to have resolved last few months  3  Hyperlipidemia, LDL ranging 60-90, however did lose 25 pounds     plan:  It has been 5 years since patient had imaging stress test, with recent symptoms I reordered exercise nuclear stress testing once again  Unfortunately, his insurance denied this request last visit  Will obtain lipid panel this year  Otherwise, continue current regimen of lifestyle modification with sensible/Mediterranean type diet and daily exercise  Interval History:     51-year-old male known coronary artery disease with coronary artery bypass grafting surgery x4 at the age of 43years old  Few months ago had fatigue and chest tightness off and on  Seems to have resolved and is back to the gym without further symptoms  Has recovered from a heel injury which required surgery  Thankfully, has been able to get back to the gym and has lost 25 pounds  Recent LDL in the 90 range, prior low 70 and prior to that in the 66 range  Blood pressures been well controlled on low-dose metoprolol            Patient Active Problem List   Diagnosis    Hx of CABG    Hypertension    Arteriosclerotic cardiovascular disease    Hyperlipidemia    Insomnia    Thrombocytopenia (HCC)     Past Medical History:   Diagnosis Date    Acute angina (HCC)     High blood pressure     NOT HYPERTENSION    Irregular heartbeat     ST elevation (STEMI) myocardial infarction of unspecified site ([facility] [address] )     Valvular heart disease      Social History     Socioeconomic History    Marital status: /Civil Union     Spouse name: Not on file    Number of children: 2    Years of education: Not on file    Highest education level: Not on file   Occupational History    Occupation: TRAVEL    Social Needs    Financial resource strain: Not on file    Food insecurity:     Worry: Not on file     Inability: Not on file    Transportation needs:     Medical: Not on file     Non-medical: Not on file   Tobacco Use    Smoking status: Never Smoker    Smokeless tobacco: Never Used   Substance and Sexual Activity    Alcohol use: Yes     Comment: BEING A SOCIAL DRINKER: <= 1 DRINK/WEEK    Drug use: Not on file    Sexual activity: Yes   Lifestyle    Physical activity:     Days per week: Not on file     Minutes per session: Not on file    Stress: Not on file   Relationships    Social connections:     Talks on phone: Not on file     Gets together: Not on file     Attends Religion service: Not on file     Active member of club or organization: Not on file     Attends meetings of clubs or organizations: Not on file     Relationship status: Not on file    Intimate partner violence:     Fear of current or ex partner: Not on file     Emotionally abused: Not on file     Physically abused: Not on file     Forced sexual activity: Not on file   Other Topics Concern    Not on file   Social History Narrative    NO DAILY COFFEE CONSUMPTION (___CUPS/DAY)    NO DAILY COLA CONSUMPTION (___CANS/DAY)    DAILY TEA CONSUMPTION (___CUPS/DAY)      Family History   Problem Relation Age of Onset    Coronary artery disease Mother     Heart disease Mother     Hypertension Mother      Past Surgical History:   Procedure Laterality Date    CARDIAC SURGERY      OPEN HEART QUAD BYPASS; RESOLVED: 2012    CORONARY ANGIOPLASTY  02/21/2012    CORONARY ANGIOGRAPHY WITHOUT CONCOMITANT LEFT HEART CATHETERIZATION; 90% PROXIMAL LAD, 90% 1ST DIAG OSTIUM AND 95% PROXIMAL THIRD, 99% PROXIMAL RAMUS INTERMEDIUS    CORONARY ANGIOPLASTY WITH STENT PLACEMENT  02/21/2012    4 VESSEL DISEASE TO HAVE CABG; MANAGED BY: SHABBIR SNYDER    CORONARY ARTERY BYPASS GRAFT      LAST ASSESSED: 11/17/17    CORONARY ARTERY BYPASS GRAFT  02/29/2012    CABG X4 (SVG TO 1ST DIAGNOL, SVG TO 3RD OBTUSE MARGINAL, FREE RADIAL ARTERY TO RAMUS INTERMEDIUS, [place] TO LAD) BY DR Blanka Merritt 2012     VASECTOMY  10/27/2015    SURGERY VAS DEFERENS; MANAGED BY: Stephany Mario; LAST ASSESSED: 10/27/15       Current Outpatient Medications:     aspirin 81 mg chewable tablet, Chew 1 tablet daily, Disp: , Rfl:     metoprolol succinate (TOPROL-XL) 25 mg 24 hr tablet, Take 1 tablet (25 mg total) by mouth daily, Disp: 90 tablet, Rfl: 3    rosuvastatin (CRESTOR) 20 MG tablet, Take 1 tablet (20 mg total) by mouth daily, Disp: 90 tablet, Rfl: 3    zolpidem (AMBIEN) 10 mg tablet, Take 1 tablet (10 mg total) by mouth daily at bedtime as needed for sleep, Disp: 30 tablet, Rfl: 5  No Known Allergies      Social, Family, Medication history reviewed and updated as necessary      Labs:     Lab Results   Component Value Date     09/19/2015    K 4 2 07/24/2019     07/24/2019    CO2 29 07/24/2019    BUN 18 07/24/2019    CREATININE 0 95 07/24/2019    CREATININE 0 94 09/18/2017    GLUCOSE 82 09/19/2015    CALCIUM 9 4 07/24/2019       Lab Results   Component Value Date    WBC 5 3 07/24/2019    HGB 15 1 07/24/2019    HGB 14 8 09/18/2017    HCT 44 2 07/24/2019    HCT 42 5 09/18/2017     (L) 07/24/2019     (L) 09/18/2017       Lab Results   Component Value Date    CHOL 129 01/21/2015     Lab Results   Component Value Date    HDL 44 07/24/2019    HDL 38 (L) 09/18/2017     Lab Results   Component Value Date    LDLCALC 76 09/18/2017    LDLCALC 66 01/21/2015     No results found for: LDLDIRECT          Lab Results   Component Value Date    TRIG 112 07/24/2019    TRIG 134 09/18/2017       Lab Results   Component Value Date    ALT 20 07/24/2019    ALT 34 09/18/2017    AST 21 07/24/2019    AST 24 09/18/2017       Lab Results   Component Value Date    INR 0 99 09/19/2015    INR 1 02 01/21/2015       No results found for: NTBNP    No results found for: HGBA1C        Imaging: Reviewed in epic        Review of Systems:  14 systems reviewed and negative with exception of the above        PHYSICAL EXAM:      Vitals:    01/03/20 0939   BP: 104/70   Pulse: (!) 52     Body mass index is 30 94 kg/m²  Weight (last 2 days)     Date/Time   Weight    01/03/20 0939   109 (241)                Gen: No acute distress  HEENT: anicteric, mucous membranes moist  Neck: supple, no jugular venous distention, or carotid bruit  Heart: regular, normal s1 and s2, no murmur/rub or gallop  Lungs :clear to auscultation bilaterally, no rales/rhonchi or wheeze  Abdomen: soft nontender, normoactive bowel sounds, no organomegaly  Ext: warm and perfused, normal femoral pulses, no edema, or clubbing  Skin: warm, no rashes  Neuro: AAO x 3, no focal findings  Psychiatric: normal affect  Musculoskeletal: no obvious joint deformities

## 2020-01-24 DIAGNOSIS — E78.5 HYPERLIPIDEMIA, UNSPECIFIED HYPERLIPIDEMIA TYPE: ICD-10-CM

## 2020-01-24 RX ORDER — ROSUVASTATIN CALCIUM 20 MG/1
20 TABLET, COATED ORAL DAILY
Qty: 90 TABLET | Refills: 3 | Status: SHIPPED | OUTPATIENT
Start: 2020-01-24 | End: 2021-01-29

## 2020-02-03 ENCOUNTER — APPOINTMENT (OUTPATIENT)
Dept: LAB | Facility: CLINIC | Age: 50
End: 2020-02-03
Payer: COMMERCIAL

## 2020-02-03 ENCOUNTER — OFFICE VISIT (OUTPATIENT)
Dept: FAMILY MEDICINE CLINIC | Facility: CLINIC | Age: 50
End: 2020-02-03
Payer: COMMERCIAL

## 2020-02-03 VITALS
HEART RATE: 60 BPM | HEIGHT: 74 IN | DIASTOLIC BLOOD PRESSURE: 72 MMHG | BODY MASS INDEX: 29.95 KG/M2 | TEMPERATURE: 97.8 F | RESPIRATION RATE: 16 BRPM | WEIGHT: 233.4 LBS | SYSTOLIC BLOOD PRESSURE: 100 MMHG

## 2020-02-03 DIAGNOSIS — I10 ESSENTIAL HYPERTENSION: ICD-10-CM

## 2020-02-03 DIAGNOSIS — I25.10 ARTERIOSCLEROTIC CARDIOVASCULAR DISEASE: ICD-10-CM

## 2020-02-03 DIAGNOSIS — R73.09 ELEVATED GLUCOSE LEVEL: Primary | ICD-10-CM

## 2020-02-03 DIAGNOSIS — E78.2 MIXED HYPERLIPIDEMIA: ICD-10-CM

## 2020-02-03 DIAGNOSIS — R58 ECCHYMOSIS: ICD-10-CM

## 2020-02-03 DIAGNOSIS — D69.6 THROMBOCYTOPENIA (HCC): ICD-10-CM

## 2020-02-03 DIAGNOSIS — I10 ESSENTIAL HYPERTENSION: Primary | ICD-10-CM

## 2020-02-03 LAB
ALBUMIN SERPL BCP-MCNC: 3.8 G/DL (ref 3.5–5)
ALP SERPL-CCNC: 55 U/L (ref 46–116)
ALT SERPL W P-5'-P-CCNC: 35 U/L (ref 12–78)
ANION GAP SERPL CALCULATED.3IONS-SCNC: 6 MMOL/L (ref 4–13)
AST SERPL W P-5'-P-CCNC: 14 U/L (ref 5–45)
BASOPHILS # BLD AUTO: 0.01 THOUSANDS/ΜL (ref 0–0.1)
BASOPHILS NFR BLD AUTO: 0 % (ref 0–1)
BILIRUB SERPL-MCNC: 0.5 MG/DL (ref 0.2–1)
BUN SERPL-MCNC: 16 MG/DL (ref 5–25)
CALCIUM SERPL-MCNC: 9.4 MG/DL (ref 8.3–10.1)
CHLORIDE SERPL-SCNC: 109 MMOL/L (ref 100–108)
CHOLEST SERPL-MCNC: 161 MG/DL (ref 50–200)
CO2 SERPL-SCNC: 25 MMOL/L (ref 21–32)
CREAT SERPL-MCNC: 0.89 MG/DL (ref 0.6–1.3)
EOSINOPHIL # BLD AUTO: 0 THOUSAND/ΜL (ref 0–0.61)
EOSINOPHIL NFR BLD AUTO: 0 % (ref 0–6)
ERYTHROCYTE [DISTWIDTH] IN BLOOD BY AUTOMATED COUNT: 13 % (ref 11.6–15.1)
GFR SERPL CREATININE-BSD FRML MDRD: 100 ML/MIN/1.73SQ M
GLUCOSE P FAST SERPL-MCNC: 143 MG/DL (ref 65–99)
HCT VFR BLD AUTO: 43.5 % (ref 36.5–49.3)
HDLC SERPL-MCNC: 52 MG/DL
HGB BLD-MCNC: 14.4 G/DL (ref 12–17)
IMM GRANULOCYTES # BLD AUTO: 0.02 THOUSAND/UL (ref 0–0.2)
IMM GRANULOCYTES NFR BLD AUTO: 0 % (ref 0–2)
LDLC SERPL CALC-MCNC: 100 MG/DL (ref 0–100)
LYMPHOCYTES # BLD AUTO: 0.6 THOUSANDS/ΜL (ref 0.6–4.47)
LYMPHOCYTES NFR BLD AUTO: 10 % (ref 14–44)
MCH RBC QN AUTO: 30.8 PG (ref 26.8–34.3)
MCHC RBC AUTO-ENTMCNC: 33.1 G/DL (ref 31.4–37.4)
MCV RBC AUTO: 93 FL (ref 82–98)
MONOCYTES # BLD AUTO: 0.27 THOUSAND/ΜL (ref 0.17–1.22)
MONOCYTES NFR BLD AUTO: 5 % (ref 4–12)
NEUTROPHILS # BLD AUTO: 5.14 THOUSANDS/ΜL (ref 1.85–7.62)
NEUTS SEG NFR BLD AUTO: 85 % (ref 43–75)
NONHDLC SERPL-MCNC: 109 MG/DL
NRBC BLD AUTO-RTO: 0 /100 WBCS
PLATELET # BLD AUTO: 108 THOUSANDS/UL (ref 149–390)
PMV BLD AUTO: 12.3 FL (ref 8.9–12.7)
POTASSIUM SERPL-SCNC: 4.6 MMOL/L (ref 3.5–5.3)
PROT SERPL-MCNC: 7.2 G/DL (ref 6.4–8.2)
RBC # BLD AUTO: 4.67 MILLION/UL (ref 3.88–5.62)
SODIUM SERPL-SCNC: 140 MMOL/L (ref 136–145)
TRIGL SERPL-MCNC: 46 MG/DL
WBC # BLD AUTO: 6.04 THOUSAND/UL (ref 4.31–10.16)

## 2020-02-03 PROCEDURE — 80061 LIPID PANEL: CPT

## 2020-02-03 PROCEDURE — 99214 OFFICE O/P EST MOD 30 MIN: CPT | Performed by: FAMILY MEDICINE

## 2020-02-03 PROCEDURE — 85025 COMPLETE CBC W/AUTO DIFF WBC: CPT

## 2020-02-03 PROCEDURE — 3074F SYST BP LT 130 MM HG: CPT | Performed by: FAMILY MEDICINE

## 2020-02-03 PROCEDURE — 36415 COLL VENOUS BLD VENIPUNCTURE: CPT

## 2020-02-03 PROCEDURE — 80053 COMPREHEN METABOLIC PANEL: CPT

## 2020-02-03 PROCEDURE — 1036F TOBACCO NON-USER: CPT | Performed by: FAMILY MEDICINE

## 2020-02-03 PROCEDURE — 3008F BODY MASS INDEX DOCD: CPT | Performed by: FAMILY MEDICINE

## 2020-02-03 PROCEDURE — 3078F DIAST BP <80 MM HG: CPT | Performed by: FAMILY MEDICINE

## 2020-02-03 RX ORDER — MULTIVIT-MIN/IRON FUM/FOLIC AC 7.5 MG-4
2 TABLET ORAL DAILY
COMMUNITY

## 2020-02-03 NOTE — ASSESSMENT & PLAN NOTE
Asymptomatic  Cont weight loss efforts  BMI Counseling: Body mass index is 29 97 kg/m²  The BMI is above normal  Nutrition recommendations include reducing portion sizes, consuming healthier snacks, moderation in carbohydrate intake, increasing intake of lean protein, reducing intake of saturated fat and trans fat and reducing intake of cholesterol  Exercise recommendations include exercising 3-5 times per week  f/u with Cardio   Recheck 6m

## 2020-02-03 NOTE — PROGRESS NOTES
Assessment/Plan:    Hypertension  Well controlled  Cont present treatment  Monitor labs  Recheck 6m      Arteriosclerotic cardiovascular disease  Asymptomatic  Cont weight loss efforts  BMI Counseling: Body mass index is 29 97 kg/m²  The BMI is above normal  Nutrition recommendations include reducing portion sizes, consuming healthier snacks, moderation in carbohydrate intake, increasing intake of lean protein, reducing intake of saturated fat and trans fat and reducing intake of cholesterol  Exercise recommendations include exercising 3-5 times per week  f/u with Cardio  Recheck 6m      Thrombocytopenia (HCC)  Mild  Recheck CBC    Hyperlipidemia  Check labs  Cont present meds and diet  Recheck 6m       Diagnoses and all orders for this visit:    Essential hypertension  -     CBC and differential; Future  -     Comprehensive metabolic panel; Future  -     Lipid panel; Future    Arteriosclerotic cardiovascular disease  -     CBC and differential; Future  -     Comprehensive metabolic panel; Future  -     Lipid panel; Future    Mixed hyperlipidemia    Ecchymosis  Comments:  L lateral ankle - ? spontaneous  Ankle otherwise WNL  Watch for now  Recheck 1 week if not resolved  Thrombocytopenia ([address] )    Other orders  -     Multiple Vitamins-Minerals (MULTIVITAMIN WITH MINERALS) tablet; Take 2 tablets by mouth daily          Subjective:      Patient ID: Semaj Marquez is a 52 y o  male  f/u multiple med issues  - pt states that he is doing very well "mentally and physically"  Pt has made a conscious effort to exercise more and lose weight over the last 3m  pt reports approx 30+lb since last visit  - pt was at the dentist when he developed acute L lateral ankle pain and bruising  Pt developed significant bruising over the site within 24h with difficulty ambulating  Symptoms lasted a few day but had resolved within 5 days  Pt still has some bruising that is resolving   Pt was able to play tennis this weekend  - pt states that "everyone tells me I have ADHD"  Pt states that he his like this his whole life      - pt exercising regularly as above  Plays tennis multiple times a week and uses an [employer] machine 2x a week for 45min  Pt denies CP, palpitations, lightheadedness or other CV symptoms with or without exertion  Pt was having SOB with exertion when he was heavier but is doing better now since he lost weight  Up to date with Cardio  Cardio ordered a repeat stress myoview but it was denied by insurance  Altamease Conquest off of diet 3m ago   - still dealing with insomnia  Still has issues both falling and remaining asleep  Pt states that he needs zolpidem to sleep    - no GI or  complaints  - no other concerns      The following portions of the patient's history were reviewed and updated as appropriate:   He  has a past medical history of Acute angina ([facility] [address] ), High blood pressure, Irregular heartbeat, ST elevation (STEMI) myocardial infarction of unspecified site [facility]), and Valvular heart disease  He   Patient Active Problem List    Diagnosis Date Noted    Hx of CABG 12/13/2018    Hypertension 12/13/2018    Thrombocytopenia ([facility] [address] ) 01/23/2015    Arteriosclerotic cardiovascular disease 08/02/2012    Hyperlipidemia 08/02/2012    Insomnia 08/02/2012     He  has a past surgical history that includes Coronary artery bypass graft; Coronary artery bypass graft (02/29/2012); Coronary angioplasty with stent (02/21/2012); Coronary angioplasty (02/21/2012); Cardiac surgery; and Vasectomy (10/27/2015)  He  reports that he has never smoked  He has never used smokeless tobacco  He reports that he drinks alcohol  His drug history is not on file    Current Outpatient Medications   Medication Sig Dispense Refill    aspirin 81 mg chewable tablet Chew 1 tablet daily      metoprolol succinate (TOPROL-XL) 25 mg 24 hr tablet Take 1 tablet (25 mg total) by mouth daily 90 tablet 3    Multiple Vitamins-Minerals (MULTIVITAMIN WITH MINERALS) tablet Take 2 tablets by mouth daily      rosuvastatin (CRESTOR) 20 MG tablet Take 1 tablet (20 mg total) by mouth daily 90 tablet 3    zolpidem (AMBIEN) 10 mg tablet Take 1 tablet (10 mg total) by mouth daily at bedtime as needed for sleep 30 tablet 5     No current facility-administered medications for this visit  He has No Known Allergies       Review of Systems   Constitutional: Negative  HENT: Negative  Eyes: Negative  Respiratory: Negative  Cardiovascular: Negative  Gastrointestinal: Negative  Endocrine: Negative  Genitourinary: Negative  Musculoskeletal: Positive for arthralgias  Skin: Negative  Allergic/Immunologic: Negative  Neurological: Negative  Hematological: Negative  Psychiatric/Behavioral: Negative  Objective:      /72   Pulse 60   Temp 97 8 °F (36 6 °C)   Resp 16   Ht 6' 2" (1 88 m)   Wt 106 kg (233 lb 6 4 oz)   BMI 29 97 kg/m²          Physical Exam   Constitutional: He is oriented to person, place, and time  He appears well-developed and well-nourished  HENT:   Head: Normocephalic and atraumatic  Right Ear: External ear normal    Left Ear: External ear normal    Nose: Nose normal    Mouth/Throat: Oropharynx is clear and moist    Eyes: Pupils are equal, round, and reactive to light  Conjunctivae and EOM are normal    Neck: Normal range of motion  Neck supple  No thyromegaly present  Cardiovascular: Normal rate, regular rhythm, normal heart sounds and intact distal pulses  No murmur heard  Pulmonary/Chest: Effort normal and breath sounds normal    Abdominal: Soft  Bowel sounds are normal  He exhibits no distension and no mass  There is no tenderness  Musculoskeletal: Normal range of motion  He exhibits tenderness (sl tenderness over a resolving bruise over the L lateral ankle  Ankle with good ROM  )  He exhibits no edema  Lymphadenopathy:     He has no cervical adenopathy     Neurological: He is alert and oriented to person, place, and time  He displays normal reflexes  No cranial nerve deficit or sensory deficit  He exhibits normal muscle tone  Coordination normal    Skin: Skin is dry  Capillary refill takes less than 2 seconds  No erythema  Resolving L lateral ankle bruise   Psychiatric: He has a normal mood and affect   His behavior is normal  Judgment and thought content normal

## 2020-03-19 ENCOUNTER — OFFICE VISIT (OUTPATIENT)
Dept: FAMILY MEDICINE CLINIC | Facility: CLINIC | Age: 50
End: 2020-03-19
Payer: COMMERCIAL

## 2020-03-19 VITALS
WEIGHT: 225.4 LBS | TEMPERATURE: 98 F | HEART RATE: 56 BPM | OXYGEN SATURATION: 98 % | BODY MASS INDEX: 28.93 KG/M2 | DIASTOLIC BLOOD PRESSURE: 72 MMHG | SYSTOLIC BLOOD PRESSURE: 112 MMHG | HEIGHT: 74 IN

## 2020-03-19 DIAGNOSIS — J06.9 ACUTE URI: Primary | ICD-10-CM

## 2020-03-19 PROCEDURE — 3074F SYST BP LT 130 MM HG: CPT | Performed by: FAMILY MEDICINE

## 2020-03-19 PROCEDURE — 1036F TOBACCO NON-USER: CPT | Performed by: FAMILY MEDICINE

## 2020-03-19 PROCEDURE — 3008F BODY MASS INDEX DOCD: CPT | Performed by: FAMILY MEDICINE

## 2020-03-19 PROCEDURE — 99213 OFFICE O/P EST LOW 20 MIN: CPT | Performed by: FAMILY MEDICINE

## 2020-03-19 PROCEDURE — 3078F DIAST BP <80 MM HG: CPT | Performed by: FAMILY MEDICINE

## 2020-03-19 NOTE — PROGRESS NOTES
Assessment/Plan:      Diagnoses and all orders for this visit:    Acute URI  Comments:  No fever  O2 sat 98%  Cont Xyzal and conservative measures  Nasal rinse qHS  Recheck Monday if not improved - earlier if worse          Subjective:     Patient ID: Lj Obrien is a 52 y o  male  80-year-old male presents with a 4 to five-day history of nasal congestion, cough and some shortness of breath  Denies any fever chills  Patient attributes this to allergies though he does not have any history of spring time allergy symptoms  He does travel a lot for work and recently returned from [place], [place]  Patient denies being around anybody that he knows who has been sick  Review of Systems   Constitutional: Negative for chills, fatigue and fever  HENT: Positive for congestion, postnasal drip and rhinorrhea  Negative for mouth sores, sinus pressure, sinus pain and sore throat  Eyes: Negative  Respiratory: Positive for cough  Negative for shortness of breath and wheezing  Musculoskeletal: Negative  Skin: Negative  Objective:    Vitals:    03/19/20 1508   BP: 112/72   Pulse: 56   Temp: 98 °F (36 7 °C)   SpO2: 98%   Weight: 102 kg (225 lb 6 4 oz)   Height: 6' 2" (1 88 m)        Physical Exam   Constitutional: He appears well-developed and well-nourished  HENT:   Head: Normocephalic and atraumatic  Right Ear: External ear normal    Left Ear: External ear normal    Mouth/Throat: Oropharynx is clear and moist    Nose with sl congestion   Eyes: Pupils are equal, round, and reactive to light  Conjunctivae and EOM are normal    Neck: Normal range of motion  Cardiovascular: Normal rate, regular rhythm and intact distal pulses  Pulmonary/Chest: Effort normal and breath sounds normal  No respiratory distress  He has no wheezes  He has no rales  Lymphadenopathy:     He has no cervical adenopathy  Skin: Skin is warm  Capillary refill takes less than 2 seconds  Vitals reviewed

## 2020-04-22 DIAGNOSIS — F51.01 PRIMARY INSOMNIA: ICD-10-CM

## 2020-04-22 RX ORDER — ZOLPIDEM TARTRATE 10 MG/1
10 TABLET ORAL
Qty: 30 TABLET | Refills: 5 | Status: SHIPPED | OUTPATIENT
Start: 2020-04-22 | End: 2020-10-30

## 2020-06-11 ENCOUNTER — OFFICE VISIT (OUTPATIENT)
Dept: FAMILY MEDICINE CLINIC | Facility: CLINIC | Age: 50
End: 2020-06-11
Payer: COMMERCIAL

## 2020-06-11 ENCOUNTER — TELEPHONE (OUTPATIENT)
Dept: FAMILY MEDICINE CLINIC | Facility: CLINIC | Age: 50
End: 2020-06-11

## 2020-06-11 VITALS
WEIGHT: 217 LBS | BODY MASS INDEX: 27.85 KG/M2 | DIASTOLIC BLOOD PRESSURE: 72 MMHG | HEART RATE: 60 BPM | SYSTOLIC BLOOD PRESSURE: 120 MMHG | TEMPERATURE: 98.7 F | HEIGHT: 74 IN

## 2020-06-11 DIAGNOSIS — M25.521 RIGHT ELBOW PAIN: Primary | ICD-10-CM

## 2020-06-11 PROCEDURE — 1036F TOBACCO NON-USER: CPT | Performed by: FAMILY MEDICINE

## 2020-06-11 PROCEDURE — 99213 OFFICE O/P EST LOW 20 MIN: CPT | Performed by: FAMILY MEDICINE

## 2020-06-11 PROCEDURE — 3008F BODY MASS INDEX DOCD: CPT | Performed by: FAMILY MEDICINE

## 2020-06-11 PROCEDURE — 3074F SYST BP LT 130 MM HG: CPT | Performed by: FAMILY MEDICINE

## 2020-06-11 PROCEDURE — 3078F DIAST BP <80 MM HG: CPT | Performed by: FAMILY MEDICINE

## 2020-07-18 ENCOUNTER — NURSE TRIAGE (OUTPATIENT)
Dept: OTHER | Facility: OTHER | Age: 50
End: 2020-07-18

## 2020-07-18 ENCOUNTER — HOSPITAL ENCOUNTER (EMERGENCY)
Facility: HOSPITAL | Age: 50
Discharge: HOME | End: 2020-07-18
Attending: EMERGENCY MEDICINE
Payer: COMMERCIAL

## 2020-07-18 ENCOUNTER — APPOINTMENT (EMERGENCY)
Dept: RADIOLOGY | Facility: HOSPITAL | Age: 50
End: 2020-07-18
Payer: COMMERCIAL

## 2020-07-18 VITALS
WEIGHT: 220.02 LBS | OXYGEN SATURATION: 98 % | RESPIRATION RATE: 22 BRPM | SYSTOLIC BLOOD PRESSURE: 122 MMHG | DIASTOLIC BLOOD PRESSURE: 77 MMHG | HEIGHT: 74 IN | HEART RATE: 61 BPM | BODY MASS INDEX: 28.24 KG/M2 | TEMPERATURE: 97.7 F

## 2020-07-18 DIAGNOSIS — R50.9 FEVER: ICD-10-CM

## 2020-07-18 DIAGNOSIS — B34.9 VIRAL DISEASE: Primary | ICD-10-CM

## 2020-07-18 LAB — SARS-COV-2 RNA RESP QL NAA+PROBE: NEGATIVE

## 2020-07-18 PROCEDURE — 99284 EMERGENCY DEPT VISIT MOD MDM: CPT | Performed by: EMERGENCY MEDICINE

## 2020-07-18 PROCEDURE — 87635 SARS-COV-2 COVID-19 AMP PRB: CPT | Performed by: EMERGENCY MEDICINE

## 2020-07-18 PROCEDURE — 71045 X-RAY EXAM CHEST 1 VIEW: CPT

## 2020-07-18 PROCEDURE — 99285 EMERGENCY DEPT VISIT HI MDM: CPT

## 2020-07-18 NOTE — TELEPHONE ENCOUNTER
Regarding: COVID  ----- Message from Donald Dunham sent at 7/18/2020  9:34 AM EDT -----  "I believe I may have COVID and I'm not sure what my next steps would be   I have a fever, chills and have been sleeping an insane amount "

## 2020-07-18 NOTE — TELEPHONE ENCOUNTER
Reason for Disposition   HIGH RISK patient (e g , age > 59 years, diabetes, heart or lung disease, weak immune system)    Additional Information   [1] COVID-19 suspected (e g , cough, fever, shortness of breath) AND [2] mild symptoms AND [3] public health department recommends testing    Answer Assessment - Initial Assessment Questions  1  COVID-19 DIAGNOSIS: "Who made your Coronavirus (COVID-19) diagnosis?" "Was it confirmed by a positive lab test?" If not diagnosed by a HCP, ask "Are there lots of cases (community spread) where you live?" (See public health department website, if unsure)    * MAJOR community spread: high number of cases; numbers of cases are increasing; many people hospitalized  * MINOR community spread: low number of cases; not increasing; few or no people hospitalized      Not tested, yes lots of positive cases in area  2  ONSET: "When did the COVID-19 symptoms start?"       2 days ago  3  WORST SYMPTOM: "What is your worst symptom?" (e g , cough, fever, shortness of breath, muscle aches)      Nausea   4  COUGH: "Do you have a cough?" If so, ask: "How bad is the cough?"        Slight cough, pt thinks its allegies  5  FEVER: "Do you have a fever?" If so, ask: "What is your temperature, how was it measured, and when did it start?"      99 8, started yesterday  6  RESPIRATORY STATUS: "Describe your breathing?" (e g , shortness of breath, wheezing, unable to speak)       SOB but not extreme  7  BETTER-SAME-WORSE: "Are you getting better, staying the same or getting worse compared to yesterday?"  If getting worse, ask, "In what way?"      same  8  HIGH RISK DISEASE: "Do you have any chronic medical problems?" (e g , asthma, heart or lung disease, weak immune system, etc )      Heart disease, bypass 8 years      10   OTHER SYMPTOMS: "Do you have any other symptoms?"  (e g , runny nose, headache, sore throat, loss of smell)        Tired, headache    Protocols used: CORONAVIRUS (COVID-19) - DIAGNOSED OR SUSPECTED-ADULT-AH    Pt has mild covid symptoms but no known exposure  Pt has low grade fever, SOB, slight cough  History of quadruple bypass 8 years ago  Covering physician ([address] [place] [address]) contacted and wants pt to be evaluated in the ER  Pt agrees to plan and will go to [facility] now

## 2020-07-18 NOTE — ED PROVIDER NOTES
History  Chief Complaint   Patient presents with    Shortness of Breath     pt reports "i think i have covid"  excessive sleep, sweats, SOB, nausea, fever  48 y o  Male presents with chief complaint of fever, chills, sweating, fatigue and shortness of breath  Onset was 4 days ago  Patient has not had any sick contacts that he can think of  Patient's past medical history is significant for CAD/MI  Patient is on a statin, b-blocker and aspirin  No lung disease  Currently patient is afebrile (although he is diaphoretic) and his other vital signs are stable  He reports currently his shortness of breath is much improved but that it was bad last night  He reports he has been sleeping for the past 3 days  History provided by:  Patient   used: No    Shortness of Breath   Severity:  Moderate  Onset quality:  Sudden  Duration:  1 day  Timing:  Constant  Progression:  Improving  Chronicity:  New  Context: URI    Relieved by:  Nothing  Worsened by:  Nothing  Ineffective treatments:  None tried  Associated symptoms: diaphoresis and fever    Associated symptoms: no chest pain, no rash and no vomiting        Prior to Admission Medications   Prescriptions Last Dose Informant Patient Reported? Taking?    Multiple Vitamins-Minerals (MULTIVITAMIN WITH MINERALS) tablet  Self Yes No   Sig: Take 2 tablets by mouth daily   aspirin 81 mg chewable tablet  Self Yes No   Sig: Chew 1 tablet daily   metoprolol succinate (TOPROL-XL) 25 mg 24 hr tablet  Self No No   Sig: Take 1 tablet (25 mg total) by mouth daily   rosuvastatin (CRESTOR) 20 MG tablet  Self No No   Sig: Take 1 tablet (20 mg total) by mouth daily   zolpidem (AMBIEN) 10 mg tablet   No No   Sig: Take 1 tablet (10 mg total) by mouth daily at bedtime as needed for sleep      Facility-Administered Medications: None       Past Medical History:   Diagnosis Date    Acute angina (HCC)     High blood pressure     NOT HYPERTENSION    Irregular heartbeat     ST elevation (STEMI) myocardial infarction of unspecified site ([facility] [address] )     Valvular heart disease        Past Surgical History:   Procedure Laterality Date    CARDIAC SURGERY      OPEN HEART QUAD BYPASS; RESOLVED: 2012    CORONARY ANGIOPLASTY  02/21/2012    CORONARY ANGIOGRAPHY WITHOUT CONCOMITANT LEFT HEART CATHETERIZATION; 90% PROXIMAL LAD, 90% 1ST DIAG OSTIUM AND 95% PROXIMAL THIRD, 99% PROXIMAL RAMUS INTERMEDIUS    CORONARY ANGIOPLASTY WITH STENT PLACEMENT  02/21/2012    4 VESSEL DISEASE TO HAVE CABG; MANAGED BY: SHABBIR SNYDER    CORONARY ARTERY BYPASS GRAFT      LAST ASSESSED: 11/17/17    CORONARY ARTERY BYPASS GRAFT  02/29/2012    CABG X4 (SVG TO 1ST DIAGNOL, SVG TO 3RD OBTUSE MARGINAL, FREE RADIAL ARTERY TO RAMUS INTERMEDIUS, SULLIVAN TO LAD) BY DR Marylin Aponte 2012     VASECTOMY  10/27/2015    SURGERY VAS DEFERENS; MANAGED BY: Devan Antoine; LAST ASSESSED: 10/27/15       Family History   Problem Relation Age of Onset    Coronary artery disease Mother     Heart disease Mother     Hypertension Mother      I have reviewed and agree with the history as documented  E-Cigarette/Vaping     E-Cigarette/Vaping Substances    Nicotine No     THC No     CBD No     Flavoring No     Other No     Unknown No      Social History     Tobacco Use    Smoking status: Never Smoker    Smokeless tobacco: Never Used   Substance Use Topics    Alcohol use: Not Currently     Comment: BEING A SOCIAL DRINKER: <= 1 DRINK/WEEK    Drug use: Never       Review of Systems   Constitutional: Positive for diaphoresis, fatigue and fever  Negative for chills  Respiratory: Positive for shortness of breath  Cardiovascular: Negative for chest pain and palpitations  Gastrointestinal: Negative for diarrhea, nausea and vomiting  Genitourinary: Negative for dysuria and frequency  Skin: Negative for rash  All other systems reviewed and are negative        Physical Exam  Physical Exam   Constitutional: He is oriented to person, place, and time  He appears well-developed and well-nourished  He appears distressed (mild)  HENT:   Head: Normocephalic and atraumatic  Eyes: Pupils are equal, round, and reactive to light  EOM are normal    Neck: Normal range of motion  No JVD present  Cardiovascular: Normal rate, regular rhythm, normal heart sounds and intact distal pulses  Exam reveals no gallop and no friction rub  No murmur heard  Pulmonary/Chest: Effort normal and breath sounds normal  No respiratory distress  He has no wheezes  He has no rales  He exhibits no tenderness  Musculoskeletal: Normal range of motion  He exhibits no tenderness  Neurological: He is alert and oriented to person, place, and time  Skin: Skin is warm  He is diaphoretic  Psychiatric: He has a normal mood and affect  His behavior is normal  Judgment and thought content normal    Nursing note and vitals reviewed  Vital Signs  ED Triage Vitals [07/18/20 1246]   Temperature Pulse Respirations Blood Pressure SpO2   97 7 °F (36 5 °C) 61 22 122/77 100 %      Temp Source Heart Rate Source Patient Position - Orthostatic VS BP Location FiO2 (%)   Oral Monitor Sitting Right arm --      Pain Score       No Pain           Vitals:    07/18/20 1246   BP: 122/77   Pulse: 61   Patient Position - Orthostatic VS: Sitting         Visual Acuity      ED Medications  Medications - No data to display    Diagnostic Studies  Results Reviewed     Procedure Component Value Units Date/Time    Novel Coronavirus [facility] [[id number]] Collected:  07/18/20 1258    Lab Status: In process Specimen:  Nares from Nose Updated:  07/18/20 1304                 XR chest portable   ED Interpretation by Domitila Mcarthur MD (07/18 1340)   This film was interpreted independently by me  No infiltrate, cardiac silhouette normal, no pleural effusions or pulmonary edema  No change from 1/2013 chest xray  Sternotomy wire, valve surgical ring noted          by [place] Marisol (07/18 1315)                 Procedures  Procedures         ED Course  ED Course as of Jul 18 1346   Sat Jul 18, 2020   1345 I relayed xray findings to patient and informed him we would call him with COVID result  I also stressed he should return to the ER should his shortness of breath return  He was satting 98% on room air without respiratory distress, he was still clammy  MDM  Number of Diagnoses or Management Options  Fever: new and requires workup  Viral disease: new and requires workup  Diagnosis management comments: Patient presents with concerns about possible covid-19 infection  I will get covid swab and chest xray  If xray is normal will discharge and call with covid results  Amount and/or Complexity of Data Reviewed  Clinical lab tests: ordered  Tests in the radiology section of CPT®: ordered and reviewed  Independent visualization of images, tracings, or specimens: yes    Risk of Complications, Morbidity, and/or Mortality  Diagnostic procedures: high    Patient Progress  Patient progress: stable        Disposition  Final diagnoses:   Viral disease   Fever     Time reflects when diagnosis was documented in both MDM as applicable and the Disposition within this note     Time User Action Codes Description Comment    7/18/2020  1:40 PM John GOFF Add [B34 9] Viral disease     7/18/2020  1:40 PM Sanjay Mcallister Add [R50 9] Fever       ED Disposition     ED Disposition Condition Date/Time Comment    Discharge Stable Sat Jul 18, 2020  1:40 PM Abdirashid Bauman discharge to home/self care  Follow-up Information     Follow up With Specialties Details Why Leonora Perez MD [facility] Medicine Schedule an appointment as soon as possible for a visit in 1 week  [address] [address]  [phone]            Patient's Medications   Discharge Prescriptions    No medications on file     No discharge procedures on file      PDMP Review       Value Time User    PDMP Reviewed  Yes 4/22/2020  1:15 PM Radha Costa MD          ED Provider  Electronically Signed by           Scot Allen MD  07/18/20 1408 Get Patel MD  07/18/20 6312

## 2020-08-07 ENCOUNTER — TELEPHONE (OUTPATIENT)
Dept: SURGICAL ONCOLOGY | Facility: CLINIC | Age: 50
End: 2020-08-07

## 2020-08-07 ENCOUNTER — OFFICE VISIT (OUTPATIENT)
Dept: FAMILY MEDICINE CLINIC | Facility: CLINIC | Age: 50
End: 2020-08-07
Payer: COMMERCIAL

## 2020-08-07 ENCOUNTER — APPOINTMENT (OUTPATIENT)
Dept: LAB | Facility: CLINIC | Age: 50
End: 2020-08-07
Payer: COMMERCIAL

## 2020-08-07 VITALS
WEIGHT: 216 LBS | HEART RATE: 64 BPM | SYSTOLIC BLOOD PRESSURE: 120 MMHG | BODY MASS INDEX: 27.72 KG/M2 | HEIGHT: 74 IN | DIASTOLIC BLOOD PRESSURE: 74 MMHG | TEMPERATURE: 98.7 F

## 2020-08-07 DIAGNOSIS — D69.6 THROMBOCYTOPENIA (HCC): ICD-10-CM

## 2020-08-07 DIAGNOSIS — Z12.11 SCREENING FOR COLORECTAL CANCER: ICD-10-CM

## 2020-08-07 DIAGNOSIS — Z12.12 SCREENING FOR COLORECTAL CANCER: ICD-10-CM

## 2020-08-07 DIAGNOSIS — I25.10 ARTERIOSCLEROTIC CARDIOVASCULAR DISEASE: ICD-10-CM

## 2020-08-07 DIAGNOSIS — E78.2 MIXED HYPERLIPIDEMIA: ICD-10-CM

## 2020-08-07 DIAGNOSIS — T14.8XXA BRUISING: Primary | ICD-10-CM

## 2020-08-07 DIAGNOSIS — Z00.00 ANNUAL PHYSICAL EXAM: Primary | ICD-10-CM

## 2020-08-07 DIAGNOSIS — I10 ESSENTIAL HYPERTENSION: ICD-10-CM

## 2020-08-07 LAB
ALBUMIN SERPL BCP-MCNC: 4.1 G/DL (ref 3.5–5)
ALP SERPL-CCNC: 52 U/L (ref 46–116)
ALT SERPL W P-5'-P-CCNC: 31 U/L (ref 12–78)
ANION GAP SERPL CALCULATED.3IONS-SCNC: 6 MMOL/L (ref 4–13)
AST SERPL W P-5'-P-CCNC: 20 U/L (ref 5–45)
BASOPHILS # BLD AUTO: 0.01 THOUSANDS/ΜL (ref 0–0.1)
BASOPHILS NFR BLD AUTO: 0 % (ref 0–1)
BILIRUB SERPL-MCNC: 0.59 MG/DL (ref 0.2–1)
BUN SERPL-MCNC: 17 MG/DL (ref 5–25)
CALCIUM SERPL-MCNC: 9.8 MG/DL (ref 8.3–10.1)
CHLORIDE SERPL-SCNC: 108 MMOL/L (ref 100–108)
CHOLEST SERPL-MCNC: 147 MG/DL (ref 50–200)
CO2 SERPL-SCNC: 26 MMOL/L (ref 21–32)
CREAT SERPL-MCNC: 0.98 MG/DL (ref 0.6–1.3)
EOSINOPHIL # BLD AUTO: 0.06 THOUSAND/ΜL (ref 0–0.61)
EOSINOPHIL NFR BLD AUTO: 1 % (ref 0–6)
ERYTHROCYTE [DISTWIDTH] IN BLOOD BY AUTOMATED COUNT: 12.4 % (ref 11.6–15.1)
GFR SERPL CREATININE-BSD FRML MDRD: 90 ML/MIN/1.73SQ M
GLUCOSE P FAST SERPL-MCNC: 95 MG/DL (ref 65–99)
HCT VFR BLD AUTO: 43.4 % (ref 36.5–49.3)
HDLC SERPL-MCNC: 42 MG/DL
HGB BLD-MCNC: 14.3 G/DL (ref 12–17)
IMM GRANULOCYTES # BLD AUTO: 0.01 THOUSAND/UL (ref 0–0.2)
IMM GRANULOCYTES NFR BLD AUTO: 0 % (ref 0–2)
LDLC SERPL CALC-MCNC: 87 MG/DL (ref 0–100)
LYMPHOCYTES # BLD AUTO: 1.65 THOUSANDS/ΜL (ref 0.6–4.47)
LYMPHOCYTES NFR BLD AUTO: 38 % (ref 14–44)
MCH RBC QN AUTO: 31.2 PG (ref 26.8–34.3)
MCHC RBC AUTO-ENTMCNC: 32.9 G/DL (ref 31.4–37.4)
MCV RBC AUTO: 95 FL (ref 82–98)
MONOCYTES # BLD AUTO: 0.39 THOUSAND/ΜL (ref 0.17–1.22)
MONOCYTES NFR BLD AUTO: 9 % (ref 4–12)
NEUTROPHILS # BLD AUTO: 2.2 THOUSANDS/ΜL (ref 1.85–7.62)
NEUTS SEG NFR BLD AUTO: 52 % (ref 43–75)
NONHDLC SERPL-MCNC: 105 MG/DL
NRBC BLD AUTO-RTO: 0 /100 WBCS
PLATELET # BLD AUTO: 103 THOUSANDS/UL (ref 149–390)
PMV BLD AUTO: 12.2 FL (ref 8.9–12.7)
POTASSIUM SERPL-SCNC: 4.3 MMOL/L (ref 3.5–5.3)
PROT SERPL-MCNC: 7.9 G/DL (ref 6.4–8.2)
RBC # BLD AUTO: 4.59 MILLION/UL (ref 3.88–5.62)
SODIUM SERPL-SCNC: 140 MMOL/L (ref 136–145)
TRIGL SERPL-MCNC: 88 MG/DL
WBC # BLD AUTO: 4.32 THOUSAND/UL (ref 4.31–10.16)

## 2020-08-07 PROCEDURE — 3008F BODY MASS INDEX DOCD: CPT | Performed by: FAMILY MEDICINE

## 2020-08-07 PROCEDURE — 85025 COMPLETE CBC W/AUTO DIFF WBC: CPT

## 2020-08-07 PROCEDURE — 36415 COLL VENOUS BLD VENIPUNCTURE: CPT

## 2020-08-07 PROCEDURE — 3725F SCREEN DEPRESSION PERFORMED: CPT | Performed by: FAMILY MEDICINE

## 2020-08-07 PROCEDURE — 99396 PREV VISIT EST AGE 40-64: CPT | Performed by: FAMILY MEDICINE

## 2020-08-07 PROCEDURE — 1036F TOBACCO NON-USER: CPT | Performed by: FAMILY MEDICINE

## 2020-08-07 PROCEDURE — 80053 COMPREHEN METABOLIC PANEL: CPT

## 2020-08-07 PROCEDURE — 80061 LIPID PANEL: CPT

## 2020-08-07 NOTE — PATIENT INSTRUCTIONS

## 2020-08-07 NOTE — TELEPHONE ENCOUNTER
New Patient Encounter    New Patient Intake Form   Patient Details:  Saige Basilio  1970  [id number]    Background Information:  [address] starts by opening a telephone encounter and gathering the following information   Who is calling to schedule? If not self, relationship to patient? self   Referring Provider Dr Liz Cantrell   What is the diagnosis? Bruising and Thrombocytopenia    Is this diagnosis confirmed? Yes   When was the diagnosis? ongoing   Is there a confirmed diagnosis from a biopsy/tissue reviewed by pathology? na   Is patient aware of diagnosis? Yes   Is there a personal history and what kind? na   Is there a family history and what kind? na   Reason for visit? New Diagnosis   Have you had any imaging or labs done? If so: when, where? yes  [facility]   Are records in [facility]? yes   Was the patient told to bring a disk? no   Does the patient smoke or Vape? no   If yes, how many packs or cartridges per day? na   Scheduling Information:   Preferred [place]: Jessicarolando Phillips and Virgilio     Are there any dates/time the patient cannot be seen? na   Miscellaneous: na   After completing the above information, please route to Financial Counselor and the appropriate Nurse Navigator for review

## 2020-08-07 NOTE — PROGRESS NOTES
ADULT ANNUAL [facility]    NAME: Barrett Lawrence  AGE: 48 y o  SEX: male  : 1970     DATE: 8/10/2020     Assessment and Plan:     Problem List Items Addressed This Visit        Cardiovascular and Mediastinum    Arteriosclerotic cardiovascular disease     Asymptomatic  F/u with cardio  Monitor lipids  Recheck 6m         Relevant Orders    CBC and differential (Completed)    Comprehensive metabolic panel (Completed)    Lipid panel (Completed)    Hypertension     Well controlled  Cont present treatment  Monitor labs  Recheck 6m         Relevant Orders    CBC and differential (Completed)    Comprehensive metabolic panel (Completed)    Lipid panel (Completed)       Other    Hyperlipidemia     Check labs  Continue present care  Recheck 6m         Thrombocytopenia (HCC)     With increased bruising? Check CBC  Consider Hem eval           Other Visit Diagnoses     Annual physical exam    -  Primary    Screening for colorectal cancer        Relevant Orders    Cologuard          Immunizations and preventive care screenings were discussed with patient today  Appropriate education was printed on patient's after visit summary  Counseling:  · Exercise: the importance of regular exercise/physical activity was discussed  Recommend exercise 3-5 times per week for at least 30 minutes  · BMI Counseling: Body mass index is 27 73 kg/m²  The BMI is above normal  Nutrition recommendations include reducing portion sizes, decreasing overall calorie intake, consuming healthier snacks, moderation in carbohydrate intake, increasing intake of lean protein, reducing intake of saturated fat and trans fat and reducing intake of cholesterol  Return in 6 months (on 2021)       Chief Complaint:     Chief Complaint   Patient presents with    Follow-up    Physical Exam      History of Present Illness:     Adult Annual Physical   Patient here for a comprehensive physical exam  The patient reports problems - as below  - pt recently had collapse of this L arch  Has seen podiatry who ordered orthotics and made shoe recommendations  Pt notes improvement in symptoms with orthotics  -  Denies CP, palpitations, lightheadedness or other CV symptoms with or without exertion  Up to date with Cardio  - some increased bruising on extremities and trunk  ?not always associated with trauma  No other skin issues      Diet and Physical Activity  · Diet/Nutrition: well balanced diet and low fat diet  · Exercise: moderate cardiovascular exercise and 3-4 times a week on average  Depression Screening  PHQ-9 Depression Screening    PHQ-9:    Frequency of the following problems over the past two weeks:       Little interest or pleasure in doing things:  0 - not at all  Feeling down, depressed, or hopeless:  0 - not at all  PHQ-2 Score:  0       General Health  · Sleep: sleeps well and gets 7-8 hours of sleep on average  · Hearing: normal - bilateral   · Vision: vision problems: slowly worsening vision, goes for regular eye exams and wears glasses  · Dental: regular dental visits and brushes teeth twice daily   Health  · Symptoms include: none     Review of Systems:     Review of Systems   Constitutional: Negative  HENT: Negative  Eyes: Negative  Respiratory: Negative  Cardiovascular: Negative  Gastrointestinal: Negative  Endocrine: Negative  Genitourinary: Negative  Musculoskeletal: Positive for arthralgias (L foot - improving)  Skin: Negative  Allergic/Immunologic: Negative  Neurological: Negative  Hematological: Bruises/bleeds easily  Psychiatric/Behavioral: Negative         Past Medical History:     Past Medical History:   Diagnosis Date    Acute angina ([facility] [address] )     High blood pressure     NOT HYPERTENSION    Irregular heartbeat     ST elevation (STEMI) myocardial infarction of unspecified site [facility])     Valvular heart disease       Past Surgical History:     Past Surgical History:   Procedure Laterality Date    CARDIAC SURGERY      OPEN HEART QUAD BYPASS; RESOLVED: 2012    CORONARY ANGIOPLASTY  02/21/2012    CORONARY ANGIOGRAPHY WITHOUT CONCOMITANT LEFT HEART CATHETERIZATION; 90% PROXIMAL LAD, 90% 1ST DIAG OSTIUM AND 95% PROXIMAL THIRD, 99% PROXIMAL RAMUS INTERMEDIUS    CORONARY ANGIOPLASTY WITH STENT PLACEMENT  02/21/2012    4 VESSEL DISEASE TO HAVE CABG; MANAGED BY: SHABBIR SNYDER    CORONARY ARTERY BYPASS GRAFT      LAST ASSESSED: 11/17/17    CORONARY ARTERY BYPASS GRAFT  02/29/2012    CABG X4 (SVG TO 1ST DIAGNOL, SVG TO 3RD OBTUSE MARGINAL, FREE RADIAL ARTERY TO RAMUS INTERMEDIUS, SULLIVAN TO LAD) BY DR Obed Rojas 2012     VASECTOMY  10/27/2015    SURGERY VAS DEFERENS; MANAGED BY: Liliane Hardin; LAST ASSESSED: 10/27/15      Family History:     Family History   Problem Relation Age of Onset    Coronary artery disease Mother     Heart disease Mother     Hypertension Mother       Social History:        Social History     Socioeconomic History    Marital status: /Civil Union     Spouse name: None    Number of children: 2    Years of education: None    Highest education level: None   Occupational History    Occupation: TRAVEL    Social Needs    Financial resource strain: None    Food insecurity     Worry: None     Inability: None    Transportation needs     Medical: None     Non-medical: None   Tobacco Use    Smoking status: Never Smoker    Smokeless tobacco: Never Used   Substance and Sexual Activity    Alcohol use: Not Currently     Comment: BEING A SOCIAL DRINKER: <= 1 DRINK/WEEK    Drug use: Never    Sexual activity: Yes   Lifestyle    Physical activity     Days per week: None     Minutes per session: None    Stress: None   Relationships    Social connections     Talks on phone: None     Gets together: None     Attends Jainism service: None     Active member of club or organization: None     Attends meetings of clubs or organizations: None     Relationship status: None    Intimate partner violence     Fear of current or ex partner: None     Emotionally abused: None     Physically abused: None     Forced sexual activity: None   Other Topics Concern    None   Social History Narrative    NO DAILY COFFEE CONSUMPTION (___CUPS/DAY)    NO DAILY COLA CONSUMPTION (___CANS/DAY)    DAILY TEA CONSUMPTION (___CUPS/DAY)      Current Medications:     Current Outpatient Medications   Medication Sig Dispense Refill    aspirin 81 mg chewable tablet Chew 1 tablet daily      metoprolol succinate (TOPROL-XL) 25 mg 24 hr tablet Take 1 tablet (25 mg total) by mouth daily 90 tablet 3    Multiple Vitamins-Minerals (MULTIVITAMIN WITH MINERALS) tablet Take 2 tablets by mouth daily      rosuvastatin (CRESTOR) 20 MG tablet Take 1 tablet (20 mg total) by mouth daily 90 tablet 3    zolpidem (AMBIEN) 10 mg tablet Take 1 tablet (10 mg total) by mouth daily at bedtime as needed for sleep 30 tablet 5     No current facility-administered medications for this visit  Allergies:     No Known Allergies   Physical Exam:     /74   Pulse 64   Temp 98 7 °F (37 1 °C)   Ht 6' 2" (1 88 m)   Wt 98 kg (216 lb)   BMI 27 73 kg/m²     Physical Exam  Vitals signs reviewed  Constitutional:       Appearance: Normal appearance  He is well-developed  HENT:      Head: Normocephalic and atraumatic  Comments: Pt masked     Right Ear: Tympanic membrane, ear canal and external ear normal       Left Ear: Tympanic membrane, ear canal and external ear normal    Eyes:      Extraocular Movements: Extraocular movements intact  Conjunctiva/sclera: Conjunctivae normal       Pupils: Pupils are equal, round, and reactive to light  Neck:      Musculoskeletal: Normal range of motion and neck supple  Thyroid: No thyromegaly  Vascular: No JVD     Cardiovascular:      Rate and Rhythm: Normal rate and regular rhythm  Heart sounds: Normal heart sounds  No murmur  Pulmonary:      Effort: Pulmonary effort is normal  No respiratory distress  Breath sounds: Normal breath sounds  No wheezing or rales  Abdominal:      General: Bowel sounds are normal  There is no distension  Palpations: Abdomen is soft  There is no mass  Tenderness: There is no abdominal tenderness  Genitourinary:     Prostate: Normal       Rectum: Normal    Musculoskeletal: Normal range of motion  General: Deformity (fallen L arch) present  No swelling  Lymphadenopathy:      Cervical: No cervical adenopathy  Skin:     General: Skin is warm  Capillary Refill: Capillary refill takes less than 2 seconds  Findings: Bruising (resolving L inner arm and L abd wall  No petechiae) present  Neurological:      Mental Status: He is alert and oriented to person, place, and time  Cranial Nerves: No cranial nerve deficit  Sensory: No sensory deficit  Motor: No weakness or abnormal muscle tone  Coordination: Coordination normal       Deep Tendon Reflexes: Reflexes normal    Psychiatric:         Mood and Affect: Mood normal          Behavior: Behavior normal          Thought Content:  Thought content normal          Judgment: Judgment normal           MD [place]

## 2020-09-04 ENCOUNTER — CONSULT (OUTPATIENT)
Dept: HEMATOLOGY ONCOLOGY | Facility: CLINIC | Age: 50
End: 2020-09-04
Payer: COMMERCIAL

## 2020-09-04 VITALS
OXYGEN SATURATION: 99 % | RESPIRATION RATE: 17 BRPM | WEIGHT: 224.2 LBS | BODY MASS INDEX: 28.77 KG/M2 | DIASTOLIC BLOOD PRESSURE: 85 MMHG | HEIGHT: 74 IN | TEMPERATURE: 97.7 F | SYSTOLIC BLOOD PRESSURE: 130 MMHG | HEART RATE: 53 BPM

## 2020-09-04 DIAGNOSIS — D69.6 THROMBOCYTOPENIA (HCC): ICD-10-CM

## 2020-09-04 DIAGNOSIS — T14.8XXA BRUISING: ICD-10-CM

## 2020-09-04 DIAGNOSIS — R23.8 EASY BRUISING: Primary | ICD-10-CM

## 2020-09-04 PROBLEM — R23.3 EASY BRUISING: Status: ACTIVE | Noted: 2020-09-04

## 2020-09-04 PROCEDURE — 99203 OFFICE O/P NEW LOW 30 MIN: CPT | Performed by: PHYSICIAN ASSISTANT

## 2020-09-04 NOTE — PROGRESS NOTES
Oncology Outpatient Consult Note  Elliot Up 48 y o  male MRN: @ Encounter: [id number]        Date:  9/4/2020      Assessment/ Plan:    1  Thrombocytopenia  Platelet count in the 100s to 120 since at least 2015      2   Easy bruising  History of easy bleeding  Will start initial workup to assess for source of easy bruising and bleeding  Reviewed that at this level with his platelet count does not typically cause the extent of bruising that he has described  Reviewed this could be related to inherited disorder, factor deficiency or inhibitor, other  He is asked to have initial blood workup and abdominal u/s to assess liver and spleen and f/u thereafter for review  HPI:  Elliot Up is a 48 y o  seen for initial consultation 9/4/2020 at the referral of Dr Boston Baker regarding easy bruising  Approximately every 2 months he continues in a deep dark bruise over his left flank area  This can take a month to resolve  This has been occurring for approximately 7-8 years  He does have a history of easy bleeding  As a teenager, he was prone to nose bleeds  He reported that has a history of gingival bleeding a needed to be evaluated by periodontist     He does have a history of coronary artery disease  He is s/p CABG x4 in 2012  He states he was told he bled excessively during the procedure  He does not know his father's side of the family  To his knowledge, does not have any bleeding disorders on his mother's side of the family  He does not take NSAIDs with any regularity  He is on aspirin 81 milligrams since his CABG  He drinks approximately 2 beers per week,  no liquor  He is a never smoker    He denies any fevers, chills, sweats  No known lymphadenopathy  His weight is been stable          Test Results:      Labs:   Lab Results   Component Value Date    HGB 14 3 08/07/2020    HCT 43 4 08/07/2020    MCV 95 08/07/2020     (L) 08/07/2020    WBC 4 32 08/07/2020 NRBC 0 08/07/2020     Lab Results   Component Value Date     09/19/2015    K 4 3 08/07/2020     08/07/2020    CO2 26 08/07/2020    ANIONGAP 9 09/19/2015    BUN 17 08/07/2020    CREATININE 0 98 08/07/2020    GLUCOSE 82 09/19/2015    GLUF 95 08/07/2020    CALCIUM 9 8 08/07/2020    AST 20 08/07/2020    ALT 31 08/07/2020    ALKPHOS 52 08/07/2020    PROT 7 5 09/19/2015    BILITOT 0 27 09/19/2015    EGFR 90 08/07/2020           Imaging:   No results found  ROS: As mentioned in HPI & Interval History otherwise 14 point ROS negative        Active Problems:   Patient Active Problem List   Diagnosis    Hx of CABG    Hypertension    Arteriosclerotic cardiovascular disease    Hyperlipidemia    Insomnia    Thrombocytopenia (HCC)       Past Medical History:   Past Medical History:   Diagnosis Date    Acute angina (HCC)     High blood pressure     NOT HYPERTENSION    Irregular heartbeat     ST elevation (STEMI) myocardial infarction of unspecified site ([facility] [address] )     Valvular heart disease        Surgical History:   Past Surgical History:   Procedure Laterality Date    CARDIAC SURGERY      OPEN HEART QUAD BYPASS; RESOLVED: 2012    CORONARY ANGIOPLASTY  02/21/2012    CORONARY ANGIOGRAPHY WITHOUT CONCOMITANT LEFT HEART CATHETERIZATION; 90% PROXIMAL LAD, 90% 1ST DIAG OSTIUM AND 95% PROXIMAL THIRD, 99% PROXIMAL RAMUS INTERMEDIUS    CORONARY ANGIOPLASTY WITH STENT PLACEMENT  02/21/2012    4 VESSEL DISEASE TO HAVE CABG; MANAGED BY: SHABBIR SNYDER    CORONARY ARTERY BYPASS GRAFT      LAST ASSESSED: 11/17/17    CORONARY ARTERY BYPASS GRAFT  02/29/2012    CABG X4 (SVG TO 1ST DIAGNOL, SVG TO 3RD OBTUSE MARGINAL, FREE RADIAL ARTERY TO RAMUS INTERMEDIUS, SULLIVAN TO LAD) BY DR Rosenda Hines 2012     VASECTOMY  10/27/2015    SURGERY VAS DEFERENS; MANAGED BY: Chasity Penaloza; LAST ASSESSED: 10/27/15       Family History:    Family History   Problem Relation Age of Onset    Coronary artery disease Mother    Liza Romario Heart disease Mother     Hypertension Mother        Cancer-related family history is not on file      Social History:   Social History     Socioeconomic History    Marital status: /Civil Union     Spouse name: Not on file    Number of children: 2    Years of education: Not on file    Highest education level: Not on file   Occupational History    Occupation: TRAVEL    Social Needs    Financial resource strain: Not on file    Food insecurity     Worry: Not on file     Inability: Not on file   [employer] needs     Medical: Not on file     Non-medical: Not on file   Tobacco Use    Smoking status: Never Smoker    Smokeless tobacco: Never Used   Substance and Sexual Activity    Alcohol use: Not Currently     Comment: BEING A SOCIAL DRINKER: <= 1 DRINK/WEEK    Drug use: Never    Sexual activity: Yes   Lifestyle    Physical activity     Days per week: Not on file     Minutes per session: Not on file    Stress: Not on file   Relationships    Social connections     Talks on phone: Not on file     Gets together: Not on file     Attends Orthodox service: Not on file     Active member of club or organization: Not on file     Attends meetings of clubs or organizations: Not on file     Relationship status: Not on file    Intimate partner violence     Fear of current or ex partner: Not on file     Emotionally abused: Not on file     Physically abused: Not on file     Forced sexual activity: Not on file   Other Topics Concern    Not on file   Social History Narrative    NO DAILY COFFEE CONSUMPTION (___CUPS/DAY)    NO DAILY COLA CONSUMPTION (___CANS/DAY)    DAILY TEA CONSUMPTION (___CUPS/DAY)       Current Medications:   Current Outpatient Medications   Medication Sig Dispense Refill    aspirin 81 mg chewable tablet Chew 1 tablet daily      metoprolol succinate (TOPROL-XL) 25 mg 24 hr tablet Take 1 tablet (25 mg total) by mouth daily 90 tablet 3    Multiple Vitamins-Minerals (MULTIVITAMIN WITH MINERALS) tablet Take 2 tablets by mouth daily      rosuvastatin (CRESTOR) 20 MG tablet Take 1 tablet (20 mg total) by mouth daily 90 tablet 3    zolpidem (AMBIEN) 10 mg tablet Take 1 tablet (10 mg total) by mouth daily at bedtime as needed for sleep 30 tablet 5     No current facility-administered medications for this visit  Allergies: No Known Allergies      Physical Exam:    There is no height or weight on file to calculate BSA  Ht Readings from Last 3 Encounters:   08/07/20 6' 2" (1 88 m)   07/18/20 6' 2" (1 88 m)   06/11/20 6' 2" (1 88 m)       Wt Readings from Last 3 Encounters:   08/07/20 98 kg (216 lb)   07/18/20 99 8 kg (220 lb 0 3 oz)   06/11/20 98 4 kg (217 lb)        Temp Readings from Last 3 Encounters:   08/07/20 98 7 °F (37 1 °C)   07/18/20 97 7 °F (36 5 °C) (Oral)   06/11/20 98 7 °F (37 1 °C)        BP Readings from Last 3 Encounters:   08/07/20 120/74   07/18/20 122/77   06/11/20 120/72         Pulse Readings from Last 3 Encounters:   08/07/20 64   07/18/20 61   06/11/20 60         Physical Exam    Physical Exam  Vitals signs reviewed  Constitutional:       General: He is not in acute distress  Appearance: He is well-developed  He is not diaphoretic  HENT:      Head: Normocephalic and atraumatic  Eyes:      Conjunctiva/sclera: Conjunctivae normal    Neck:      Musculoskeletal: Normal range of motion and neck supple  Trachea: No tracheal deviation  Cardiovascular:      Rate and Rhythm: Normal rate and regular rhythm  Heart sounds: No murmur  No friction rub  No gallop  Pulmonary:      Effort: Pulmonary effort is normal  No respiratory distress  Breath sounds: Normal breath sounds  No wheezing or rales  Chest:      Chest wall: No tenderness  Abdominal:      General: There is no distension  Palpations: Abdomen is soft  There is no hepatomegaly or splenomegaly  Tenderness: There is no abdominal tenderness     Lymphadenopathy: Cervical: No cervical adenopathy  Skin:     General: Skin is warm and dry  Coloration: Skin is not pale  Findings: No erythema  Neurological:      Mental Status: He is alert and oriented to person, place, and time  Psychiatric:         Behavior: Behavior normal          Thought Content:  Thought content normal          Judgment: Judgment normal              Emergency Contacts:    Andrew [address], [phone],

## 2020-09-08 ENCOUNTER — APPOINTMENT (OUTPATIENT)
Dept: LAB | Facility: CLINIC | Age: 50
End: 2020-09-08
Payer: COMMERCIAL

## 2020-09-08 DIAGNOSIS — D69.6 THROMBOCYTOPENIA (HCC): ICD-10-CM

## 2020-09-08 DIAGNOSIS — T14.8XXA BRUISING: ICD-10-CM

## 2020-09-08 LAB
APTT PPP: 25 SECONDS (ref 23–37)
BASOPHILS # BLD AUTO: 0.01 THOUSANDS/ΜL (ref 0–0.1)
BASOPHILS NFR BLD AUTO: 0 % (ref 0–1)
EOSINOPHIL # BLD AUTO: 0.07 THOUSAND/ΜL (ref 0–0.61)
EOSINOPHIL NFR BLD AUTO: 2 % (ref 0–6)
ERYTHROCYTE [DISTWIDTH] IN BLOOD BY AUTOMATED COUNT: 12.7 % (ref 11.6–15.1)
HCT VFR BLD AUTO: 43.6 % (ref 36.5–49.3)
HGB BLD-MCNC: 14.8 G/DL (ref 12–17)
IMM GRANULOCYTES # BLD AUTO: 0.01 THOUSAND/UL (ref 0–0.2)
IMM GRANULOCYTES NFR BLD AUTO: 0 % (ref 0–2)
INR PPP: 0.95 (ref 0.84–1.19)
LDH SERPL-CCNC: 152 U/L (ref 81–234)
LYMPHOCYTES # BLD AUTO: 1.36 THOUSANDS/ΜL (ref 0.6–4.47)
LYMPHOCYTES NFR BLD AUTO: 34 % (ref 14–44)
MCH RBC QN AUTO: 31.8 PG (ref 26.8–34.3)
MCHC RBC AUTO-ENTMCNC: 33.9 G/DL (ref 31.4–37.4)
MCV RBC AUTO: 94 FL (ref 82–98)
MONOCYTES # BLD AUTO: 0.33 THOUSAND/ΜL (ref 0.17–1.22)
MONOCYTES NFR BLD AUTO: 8 % (ref 4–12)
NEUTROPHILS # BLD AUTO: 2.27 THOUSANDS/ΜL (ref 1.85–7.62)
NEUTS SEG NFR BLD AUTO: 56 % (ref 43–75)
NRBC BLD AUTO-RTO: 0 /100 WBCS
PLATELET # BLD AUTO: 125 THOUSANDS/UL (ref 149–390)
PMV BLD AUTO: 11.8 FL (ref 8.9–12.7)
PROTHROMBIN TIME: 12.8 SECONDS (ref 11.6–14.5)
RBC # BLD AUTO: 4.66 MILLION/UL (ref 3.88–5.62)
WBC # BLD AUTO: 4.05 THOUSAND/UL (ref 4.31–10.16)

## 2020-09-08 PROCEDURE — 85732 THROMBOPLASTIN TIME PARTIAL: CPT

## 2020-09-08 PROCEDURE — 85025 COMPLETE CBC W/AUTO DIFF WBC: CPT | Performed by: PHYSICIAN ASSISTANT

## 2020-09-08 PROCEDURE — 85245 CLOT FACTOR VIII VW RISTOCTN: CPT

## 2020-09-08 PROCEDURE — 36415 COLL VENOUS BLD VENIPUNCTURE: CPT | Performed by: PHYSICIAN ASSISTANT

## 2020-09-08 PROCEDURE — 85240 CLOT FACTOR VIII AHG 1 STAGE: CPT

## 2020-09-08 PROCEDURE — 83615 LACTATE (LD) (LDH) ENZYME: CPT

## 2020-09-08 PROCEDURE — 85705 THROMBOPLASTIN INHIBITION: CPT

## 2020-09-08 PROCEDURE — 85246 CLOT FACTOR VIII VW ANTIGEN: CPT

## 2020-09-08 PROCEDURE — 85247 CLOT FACTOR VIII MULTIMETRIC: CPT

## 2020-09-08 PROCEDURE — 85613 RUSSELL VIPER VENOM DILUTED: CPT

## 2020-09-08 PROCEDURE — 85730 THROMBOPLASTIN TIME PARTIAL: CPT

## 2020-09-08 PROCEDURE — 85670 THROMBIN TIME PLASMA: CPT

## 2020-09-08 PROCEDURE — 85610 PROTHROMBIN TIME: CPT

## 2020-09-10 LAB
FACT XIIIA PPP-ACNC: 143 % (ref 56–140)
VWF:RCO ACT/NOR PPP PL AGG: 151 % (ref 50–200)

## 2020-09-11 ENCOUNTER — HOSPITAL ENCOUNTER (OUTPATIENT)
Dept: ULTRASOUND IMAGING | Facility: HOSPITAL | Age: 50
Discharge: HOME | End: 2020-09-11
Payer: COMMERCIAL

## 2020-09-11 DIAGNOSIS — T14.8XXA BRUISING: ICD-10-CM

## 2020-09-11 DIAGNOSIS — D69.6 THROMBOCYTOPENIA (HCC): ICD-10-CM

## 2020-09-11 LAB
APTT SCREEN TO CONFIRM RATIO: 1.01 RATIO (ref 0–1.4)
CONFIRM APTT/NORMAL: 37.5 SEC (ref 0–55)
FACT XIIIA PPP-ACNC: 131 % (ref 56–140)
LA PPP-IMP: NORMAL
SCREEN APTT: 29.1 SEC (ref 0–51.9)
SCREEN DRVVT: 37.5 SEC (ref 0–47)
THROMBIN TIME: 17.6 SEC (ref 0–23)
VWF AG ACT/NOR PPP IA: 145 % (ref 50–200)
VWF:RCO ACT/NOR PPP PL AGG: 139 % (ref 50–200)

## 2020-09-11 PROCEDURE — 76700 US EXAM ABDOM COMPLETE: CPT

## 2020-09-12 LAB — FACT VIII AG ACT/NOR PPP IA: 167 %

## 2020-09-16 ENCOUNTER — OFFICE VISIT (OUTPATIENT)
Dept: HEMATOLOGY ONCOLOGY | Facility: CLINIC | Age: 50
End: 2020-09-16
Payer: COMMERCIAL

## 2020-09-16 VITALS
BODY MASS INDEX: 29 KG/M2 | TEMPERATURE: 97.3 F | WEIGHT: 226 LBS | DIASTOLIC BLOOD PRESSURE: 70 MMHG | HEART RATE: 54 BPM | SYSTOLIC BLOOD PRESSURE: 100 MMHG | OXYGEN SATURATION: 98 % | HEIGHT: 74 IN | RESPIRATION RATE: 18 BRPM

## 2020-09-16 DIAGNOSIS — R23.8 EASY BRUISING: ICD-10-CM

## 2020-09-16 DIAGNOSIS — D69.3 CHRONIC ITP (IDIOPATHIC THROMBOCYTOPENIA) (HCC): Primary | ICD-10-CM

## 2020-09-16 PROCEDURE — 3078F DIAST BP <80 MM HG: CPT | Performed by: INTERNAL MEDICINE

## 2020-09-16 PROCEDURE — 1036F TOBACCO NON-USER: CPT | Performed by: INTERNAL MEDICINE

## 2020-09-16 PROCEDURE — 3074F SYST BP LT 130 MM HG: CPT | Performed by: INTERNAL MEDICINE

## 2020-09-16 PROCEDURE — 99214 OFFICE O/P EST MOD 30 MIN: CPT | Performed by: INTERNAL MEDICINE

## 2020-09-16 NOTE — PROGRESS NOTES
Hematology Outpatient Follow - Up Note  Doris Chen 48 y o  male MRN: @ Encounter: [id number]        Date:  9/16/2020        Assessment/ Plan:    1  Chronic immune thrombocytopenic purpura with platelet count in the range of 100,130 1000 since 2015    2  Coronary artery disease status post quadruple bypass surgery he had been on aspirin currently on aspirin 81 mg p o  daily, he takes ibuprofen 2 to 3 times a week because his playing tennis    3  Workup for easy bruisability was negative, most likely this is a combination of thrombocytopenia, aspirin and p r n  Advil    4  I will follow-up in 1 year with CBC and differential             HPI: Doris Chen is a 48 y o  seen for initial consultation 9/4/2020 at the referral of Dr Kannan Bowman regarding easy bruising        Approximately every 2 months he continues in a deep dark bruise over his left flank area  This can take a month to resolve  This has been occurring for approximately 7-8 years      He does have a history of easy bleeding  As a teenager, he was prone to nose bleeds  He reported that has a history of gingival bleeding a needed to be evaluated by periodontist      He does have a history of coronary artery disease  He is s/p CABG x4 in 2012  He states he was told he bled excessively during the procedure      He does not know his father's side of the family  To his knowledge, does not have any bleeding disorders on his mother's side of the family      He does not take NSAIDs with any regularity  He is on aspirin 81 milligrams since his CABG, he takes ibuprofen almost 3 to 4 times a week after he plays tennis  He drinks approximately 2 beers per week,  no liquor    He is a never smoker    He was noticed to have chronic mild thrombocytopenia since 2015 in the range between 815485 to 099705    Workup was negative for von Willebrand's disease, normal PT, normal PTT        Interval History:  Less ecchymosis than before       Previous Treatment: Test Results:    Imaging: Us Abdomen Complete    Result Date: 9/11/2020  Narrative: ABDOMEN ULTRASOUND, COMPLETE INDICATION:   T14  8XXA: Other injury of unspecified body region, initial encounter D69 6: Thrombocytopenia, unspecified  COMPARISON: None TECHNIQUE:   Real-time ultrasound of the abdomen was performed with a curvilinear transducer with both volumetric sweeps and still imaging techniques  FINDINGS: PANCREAS:  Visualized portions of the pancreas are within normal limits  AORTA AND IVC:  Visualized portions are normal for patient age  LIVER: Size:  Within normal range  The liver measures 15 4 cm in the midclavicular line  Contour:  Surface contour is smooth  Parenchyma:  Echogenicity and echotexture are within normal limits  No evidence of suspicious mass  Limited imaging of the main portal vein shows it to be patent and hepatopetal  BILIARY: Mobile gallstones No intrahepatic biliary dilatation  CBD measures 3 4 mm  No choledocholithiasis  Negative Mcknight's sign KIDNEY: Right kidney measures 10 5 x 6 5 cm  Within normal limits  Left kidney measures 12 3 x 6 3 cm  Within normal limits  SPLEEN: Measures 9 8 x 9 2 x 3 7 cm  Within normal limits  ASCITES:  None  Impression: Cholelithiasis   Workstation performed: [id number]       Labs:   Lab Results   Component Value Date    WBC 4 05 (L) 09/08/2020    HGB 14 8 09/08/2020    HCT 43 6 09/08/2020    MCV 94 09/08/2020     (L) 09/08/2020     Lab Results   Component Value Date     09/19/2015    K 4 3 08/07/2020     08/07/2020    CO2 26 08/07/2020    ANIONGAP 9 09/19/2015    BUN 17 08/07/2020    CREATININE 0 98 08/07/2020    GLUCOSE 82 09/19/2015    GLUF 95 08/07/2020    CALCIUM 9 8 08/07/2020    AST 20 08/07/2020    ALT 31 08/07/2020    ALKPHOS 52 08/07/2020    PROT 7 5 09/19/2015    BILITOT 0 27 09/19/2015    EGFR 90 08/07/2020       No results found for: IRON, TIBC, FERRITIN    No results found for: HCEULXAT40      ROS: Review of Systems   Constitutional: Negative  Negative for appetite change, chills, diaphoresis, fatigue, fever and unexpected weight change  HENT:   Negative for hearing loss, lump/mass, mouth sores, nosebleeds, sore throat, trouble swallowing and voice change  Eyes: Negative  Negative for eye problems and icterus  Respiratory: Negative  Negative for chest tightness, cough, hemoptysis and shortness of breath  Cardiovascular: Negative for chest pain and leg swelling  Gastrointestinal: Negative for abdominal distention, abdominal pain, blood in stool, constipation, diarrhea and nausea  Endocrine: Negative  Genitourinary: Negative for dysuria, frequency, hematuria and pelvic pain  Musculoskeletal: Negative  Negative for arthralgias, back pain, flank pain, gait problem, myalgias and neck stiffness  Skin: Negative for itching and rash  Neurological: Negative for dizziness, gait problem, headaches, light-headedness, numbness and speech difficulty  Hematological: Negative for adenopathy  Bruises/bleeds easily  Psychiatric/Behavioral: Negative for confusion, decreased concentration, depression and sleep disturbance  The patient is not nervous/anxious  Current Medications: Reviewed  Allergies: Reviewed  PMH/FH/SH:  Reviewed      Physical Exam:    Body surface area is 2 29 meters squared  Wt Readings from Last 3 Encounters:   09/16/20 103 kg (226 lb)   09/04/20 102 kg (224 lb 3 2 oz)   08/07/20 98 kg (216 lb)        Temp Readings from Last 3 Encounters:   09/16/20 (!) 97 3 °F (36 3 °C) (Tympanic)   09/04/20 97 7 °F (36 5 °C)   08/07/20 98 7 °F (37 1 °C)        BP Readings from Last 3 Encounters:   09/16/20 100/70   09/04/20 130/85   08/07/20 120/74         Pulse Readings from Last 3 Encounters:   09/16/20 (!) 54   09/04/20 (!) 53   08/07/20 64        Physical Exam  Vitals signs reviewed  Constitutional:       General: He is not in acute distress  Appearance: He is well-developed   He is not diaphoretic  HENT:      Head: Normocephalic and atraumatic  Eyes:      Conjunctiva/sclera: Conjunctivae normal    Neck:      Musculoskeletal: Normal range of motion and neck supple  Trachea: No tracheal deviation  Cardiovascular:      Rate and Rhythm: Normal rate and regular rhythm  Heart sounds: No murmur  No friction rub  No gallop  Pulmonary:      Effort: Pulmonary effort is normal  No respiratory distress  Breath sounds: Normal breath sounds  No wheezing or rales  Chest:      Chest wall: No tenderness  Abdominal:      General: There is no distension  Palpations: Abdomen is soft  Tenderness: There is no abdominal tenderness  Lymphadenopathy:      Cervical: No cervical adenopathy  Skin:     General: Skin is warm and dry  Coloration: Skin is not pale  Findings: No erythema  Neurological:      Mental Status: He is alert and oriented to person, place, and time  Psychiatric:         Behavior: Behavior normal          Thought Content: Thought content normal          Judgment: Judgment normal          ECO    Goals and Barriers:  Current Goal: Minimize effects of disease  Barriers: None  Patient's Capacity to Self Care:  Patient is able to self care      Code Status: @[facility]@

## 2020-09-22 LAB — VWF MULTIMERS PPP IB: NORMAL

## 2020-10-30 DIAGNOSIS — F51.01 PRIMARY INSOMNIA: ICD-10-CM

## 2020-10-30 RX ORDER — ZOLPIDEM TARTRATE 10 MG/1
TABLET ORAL
Qty: 30 TABLET | Refills: 1 | Status: SHIPPED | OUTPATIENT
Start: 2020-10-30 | End: 2020-10-30 | Stop reason: SDUPTHER

## 2020-10-30 RX ORDER — ZOLPIDEM TARTRATE 10 MG/1
10 TABLET ORAL
Qty: 30 TABLET | Refills: 1 | Status: SHIPPED | OUTPATIENT
Start: 2020-10-30 | End: 2021-04-22

## 2020-12-04 DIAGNOSIS — I10 ESSENTIAL HYPERTENSION: ICD-10-CM

## 2020-12-04 RX ORDER — METOPROLOL SUCCINATE 25 MG/1
TABLET, EXTENDED RELEASE ORAL
Qty: 90 TABLET | Refills: 3 | Status: SHIPPED | OUTPATIENT
Start: 2020-12-04 | End: 2022-01-12 | Stop reason: SDUPTHER

## 2020-12-30 ENCOUNTER — HOSPITAL ENCOUNTER (OUTPATIENT)
Dept: NON INVASIVE DIAGNOSTICS | Facility: CLINIC | Age: 50
Discharge: HOME | End: 2020-12-30
Payer: COMMERCIAL

## 2020-12-30 DIAGNOSIS — I25.10 CORONARY ARTERY DISEASE INVOLVING NATIVE CORONARY ARTERY OF NATIVE HEART WITHOUT ANGINA PECTORIS: ICD-10-CM

## 2020-12-30 DIAGNOSIS — R06.02 SHORTNESS OF BREATH: ICD-10-CM

## 2020-12-30 DIAGNOSIS — Z95.1 S/P CABG X 4: ICD-10-CM

## 2020-12-30 DIAGNOSIS — R07.89 CHEST TIGHTNESS: ICD-10-CM

## 2020-12-30 PROCEDURE — 93017 CV STRESS TEST TRACING ONLY: CPT

## 2020-12-30 PROCEDURE — 78452 HT MUSCLE IMAGE SPECT MULT: CPT | Performed by: INTERNAL MEDICINE

## 2020-12-30 PROCEDURE — 93016 CV STRESS TEST SUPVJ ONLY: CPT | Performed by: INTERNAL MEDICINE

## 2020-12-30 PROCEDURE — 93018 CV STRESS TEST I&R ONLY: CPT | Performed by: INTERNAL MEDICINE

## 2020-12-30 PROCEDURE — 78452 HT MUSCLE IMAGE SPECT MULT: CPT

## 2020-12-30 PROCEDURE — A9502 TC99M TETROFOSMIN: HCPCS

## 2021-01-08 ENCOUNTER — OFFICE VISIT (OUTPATIENT)
Dept: CARDIOLOGY CLINIC | Facility: CLINIC | Age: 51
End: 2021-01-08
Payer: COMMERCIAL

## 2021-01-08 VITALS
HEART RATE: 50 BPM | DIASTOLIC BLOOD PRESSURE: 72 MMHG | SYSTOLIC BLOOD PRESSURE: 118 MMHG | BODY MASS INDEX: 29.9 KG/M2 | HEIGHT: 74 IN | WEIGHT: 233 LBS

## 2021-01-08 DIAGNOSIS — Z95.1 HX OF CABG: ICD-10-CM

## 2021-01-08 DIAGNOSIS — E78.2 MIXED HYPERLIPIDEMIA: ICD-10-CM

## 2021-01-08 DIAGNOSIS — I25.10 CORONARY ARTERY DISEASE INVOLVING NATIVE CORONARY ARTERY OF NATIVE HEART WITHOUT ANGINA PECTORIS: Primary | ICD-10-CM

## 2021-01-08 PROCEDURE — 93000 ELECTROCARDIOGRAM COMPLETE: CPT | Performed by: INTERNAL MEDICINE

## 2021-01-08 PROCEDURE — 99214 OFFICE O/P EST MOD 30 MIN: CPT | Performed by: INTERNAL MEDICINE

## 2021-01-08 RX ORDER — EZETIMIBE 10 MG/1
10 TABLET ORAL DAILY
Qty: 30 TABLET | Refills: 11 | Status: SHIPPED | OUTPATIENT
Start: 2021-01-08 | End: 2021-01-29 | Stop reason: SDUPTHER

## 2021-01-08 NOTE — PROGRESS NOTES
Cardiology Follow Up Visit    Cisco Cooper  1970  [id number]  [address] [facility] [address] [place] [id number]    1  Coronary artery disease involving native coronary artery of native heart without angina pectoris  POCT ECG    ezetimibe (ZETIA) 10 mg tablet    Lipid panel    AST    ALT   2  Hx of CABG     3  Mixed hyperlipidemia           Discussion/Summary:       1    Status post CABG x4 2012 ( Saphenous vein graft to diagonal 1, saphenous vein graft to OM3, radial to ramus intermedius, LIMA to LAD),normal LVEF   December 2020 stress test at 10 minutes 30 seconds Vlad protocol normal perfusion, no ischemia, LVEF 59%  2  Hyperlipidemia, LDL 87 on rosuvastatin 20 mg daily     plan: Patient doing well  Is asymptomatic  LDL not quite at goal will add Zetia 10 mg daily with follow-up lipids in 2 months  Spent 20 min with pt >50% counseling CAD and prevention    Continue Mediterranean/ sensible type diet and daily exercise which he is doing  Routine annual follow-up recommended    Interval History:     59-year-old male presents follow-up known coronary artery disease    CABG x4 2012 presenting  With NSTEMI  Found have multivessel disease     He has been maintained on CAD regimen with no recurrence symptomatic disease  Recent stress testing December 2020 no evidence of ischemia with normal LV function had good cardiac workload  Continues to lead a healthy lifestyle tries to eat sensibly and exercises        No symptoms of coronary artery disease     Blood pressure well controlled     LDL 87 on high potency statin, rosuvastatin 20 mg daily    Patient Active Problem List   Diagnosis    Hx of CABG    Hypertension    Arteriosclerotic cardiovascular disease    Hyperlipidemia    Insomnia    Thrombocytopenia (HCC)    Easy bruising    Chronic ITP (idiopathic thrombocytopenia) (HCC) Past Medical History:   Diagnosis Date    Acute angina (HCC)     High blood pressure     NOT HYPERTENSION    Irregular heartbeat     [facility]     Valvular heart disease      Social History     Socioeconomic History    Marital status: /Civil Union     Spouse name: Not on file    Number of children: 2    Years of education: Not on file    Highest education level: Not on file   Occupational History    Occupation: TRAVEL    Social Needs    Financial resource strain: Not on file    Food insecurity     Worry: Not on file     Inability: Not on file   [employer] needs     Medical: Not on file     Non-medical: Not on file   Tobacco Use    Smoking status: Never Smoker    Smokeless tobacco: Never Used   Substance and Sexual Activity    Alcohol use: Not Currently     Comment: BEING A SOCIAL DRINKER: <= 1 DRINK/WEEK    Drug use: Never    Sexual activity: Yes   Lifestyle    Physical activity     Days per week: Not on file     Minutes per session: Not on file    Stress: Not on file   Relationships    Social connections     Talks on phone: Not on file     Gets together: Not on file     Attends Sabianist service: Not on file     Active member of club or organization: Not on file     Attends meetings of clubs or organizations: Not on file     Relationship status: Not on file    Intimate partner violence     Fear of current or ex partner: Not on file     Emotionally abused: Not on file     Physically abused: Not on file     Forced sexual activity: Not on file   Other Topics Concern    Not on file   Social History Narrative    NO DAILY COFFEE CONSUMPTION (___CUPS/DAY)    NO DAILY COLA CONSUMPTION (___CANS/DAY)    DAILY TEA CONSUMPTION (___CUPS/DAY)      Family History   Problem Relation Age of Onset    Coronary artery disease Mother     Heart disease Mother     Hypertension Mother      Past Surgical History: Procedure Laterality Date    CARDIAC SURGERY      OPEN HEART QUAD BYPASS; RESOLVED: 2012    CORONARY ANGIOPLASTY  02/21/2012    CORONARY ANGIOGRAPHY WITHOUT CONCOMITANT LEFT HEART CATHETERIZATION; 90% PROXIMAL LAD, 90% 1ST DIAG OSTIUM AND 95% PROXIMAL THIRD, 99% PROXIMAL RAMUS INTERMEDIUS    CORONARY ANGIOPLASTY WITH STENT PLACEMENT  02/21/2012    4 VESSEL DISEASE TO HAVE CABG; MANAGED BY: SHABBIR SNYDER    CORONARY ARTERY BYPASS GRAFT      LAST ASSESSED: 11/17/17    CORONARY ARTERY BYPASS GRAFT  02/29/2012    CABG X4 (SVG TO 1ST DIAGNOL, SVG TO 3RD OBTUSE MARGINAL, FREE RADIAL ARTERY TO RAMUS INTERMEDIUS, SULLIVAN TO LAD) BY DR George Sierra 2012     VASECTOMY  10/27/2015    SURGERY VAS DEFERENS; MANAGED BY: Sandra Drain; LAST ASSESSED: 10/27/15       Current Outpatient Medications:     aspirin 81 mg chewable tablet, Chew 1 tablet daily, Disp: , Rfl:     metoprolol succinate (TOPROL-XL) 25 mg 24 hr tablet, take 1 tablet by mouth daily, Disp: 90 tablet, Rfl: 3    Multiple Vitamins-Minerals (MULTIVITAMIN WITH MINERALS) tablet, Take 2 tablets by mouth daily, Disp: , Rfl:     rosuvastatin (CRESTOR) 20 MG tablet, Take 1 tablet (20 mg total) by mouth daily, Disp: 90 tablet, Rfl: 3    zolpidem (AMBIEN) 10 mg tablet, Take 1 tablet (10 mg total) by mouth daily at bedtime as needed for sleep (Patient taking differently: Take 5 mg by mouth daily at bedtime as needed for sleep ), Disp: 30 tablet, Rfl: 1    ezetimibe (ZETIA) 10 mg tablet, Take 1 tablet (10 mg total) by mouth daily, Disp: 30 tablet, Rfl: 11  No Known Allergies      Social, Family, Medication history reviewed and updated as necessary      Labs:     Lab Results   Component Value Date     09/19/2015    K 4 3 08/07/2020     08/07/2020    CO2 26 08/07/2020    BUN 17 08/07/2020    CREATININE 0 98 08/07/2020    CREATININE 0 89 02/03/2020    GLUCOSE 82 09/19/2015    CALCIUM 9 8 08/07/2020       Lab Results   Component Value Date    WBC 4 05 (L) 09/08/2020    HGB 14 8 09/08/2020    HGB 14 3 08/07/2020    HCT 43 6 09/08/2020    HCT 43 4 08/07/2020     (L) 09/08/2020     (L) 08/07/2020       Lab Results   Component Value Date    CHOL 129 01/21/2015     Lab Results   Component Value Date    HDL 42 08/07/2020    HDL 52 02/03/2020     Lab Results   Component Value Date    LDLCALC 87 08/07/2020    [address] 02/03/2020     No results found for: LDLDIRECT          Lab Results   Component Value Date    TRIG 88 08/07/2020    TRIG 46 02/03/2020       Lab Results   Component Value Date    ALT 31 08/07/2020    ALT 35 02/03/2020    AST 20 08/07/2020    AST 14 02/03/2020       Lab Results   Component Value Date    INR 0 95 09/08/2020    INR 0 99 09/19/2015       No results found for: NTBNP    Lab Results   Component Value Date    HGBA1C 5 3 02/03/2020           Imaging: Reviewed in epic        Review of Systems:  14 systems reviewed and negative with exception of the above        PHYSICAL EXAM:      Vitals:    01/08/21 0924   BP: 118/72   Pulse: (!) 50     Body mass index is 29 92 kg/m²  Weight (last 2 days)     Date/Time   Weight    01/08/21 0924   106 (233)                Gen: No acute distress  HEENT: anicteric, mucous membranes moist  Neck: supple, no jugular venous distention, or carotid bruit  Heart: regular, normal s1 and s2, no murmur/rub or gallop  Lungs :clear to auscultation bilaterally, no rales/rhonchi or wheeze  Abdomen: soft nontender, normoactive bowel sounds, no organomegaly  Ext: warm and perfused, normal femoral pulses, no edema, or clubbing  Skin: warm, no rashes  Neuro: AAO x 3, no focal findings  Psychiatric: normal affect  Musculoskeletal: no obvious joint deformities

## 2021-01-29 DIAGNOSIS — I25.10 CORONARY ARTERY DISEASE INVOLVING NATIVE CORONARY ARTERY OF NATIVE HEART WITHOUT ANGINA PECTORIS: ICD-10-CM

## 2021-01-29 DIAGNOSIS — E78.5 HYPERLIPIDEMIA, UNSPECIFIED HYPERLIPIDEMIA TYPE: ICD-10-CM

## 2021-01-29 RX ORDER — ROSUVASTATIN CALCIUM 20 MG/1
20 TABLET, COATED ORAL DAILY
Qty: 90 TABLET | Refills: 3 | Status: SHIPPED | OUTPATIENT
Start: 2021-01-29 | End: 2022-01-12 | Stop reason: SDUPTHER

## 2021-01-29 RX ORDER — EZETIMIBE 10 MG/1
10 TABLET ORAL DAILY
Qty: 90 TABLET | Refills: 2 | Status: SHIPPED | OUTPATIENT
Start: 2021-01-29 | End: 2021-11-13 | Stop reason: SDUPTHER

## 2021-02-19 ENCOUNTER — OFFICE VISIT (OUTPATIENT)
Dept: FAMILY MEDICINE CLINIC | Facility: CLINIC | Age: 51
End: 2021-02-19
Payer: COMMERCIAL

## 2021-02-19 VITALS
SYSTOLIC BLOOD PRESSURE: 120 MMHG | HEART RATE: 64 BPM | DIASTOLIC BLOOD PRESSURE: 78 MMHG | TEMPERATURE: 98.7 F | HEIGHT: 74 IN | BODY MASS INDEX: 30.67 KG/M2 | WEIGHT: 239 LBS

## 2021-02-19 DIAGNOSIS — Z12.12 SCREENING FOR COLORECTAL CANCER: ICD-10-CM

## 2021-02-19 DIAGNOSIS — I10 ESSENTIAL HYPERTENSION: Primary | ICD-10-CM

## 2021-02-19 DIAGNOSIS — D69.3 CHRONIC ITP (IDIOPATHIC THROMBOCYTOPENIA) (HCC): ICD-10-CM

## 2021-02-19 DIAGNOSIS — E78.2 MIXED HYPERLIPIDEMIA: ICD-10-CM

## 2021-02-19 DIAGNOSIS — Z12.11 SCREENING FOR COLORECTAL CANCER: ICD-10-CM

## 2021-02-19 DIAGNOSIS — I25.10 ARTERIOSCLEROTIC CARDIOVASCULAR DISEASE: ICD-10-CM

## 2021-02-19 DIAGNOSIS — F32.0 CURRENT MILD EPISODE OF MAJOR DEPRESSIVE DISORDER WITHOUT PRIOR EPISODE (HCC): ICD-10-CM

## 2021-02-19 PROCEDURE — 99215 OFFICE O/P EST HI 40 MIN: CPT | Performed by: FAMILY MEDICINE

## 2021-02-19 NOTE — PROGRESS NOTES
BMI Counseling: Body mass index is 30 69 kg/m²  The BMI is above normal  Nutrition recommendations include reducing portion sizes, consuming healthier snacks, moderation in carbohydrate intake, increasing intake of lean protein, reducing intake of saturated fat and trans fat and reducing intake of cholesterol  Exercise recommendations include exercising 3-5 times per week

## 2021-02-19 NOTE — PROGRESS NOTES
Assessment/Plan:    Hypertension  Well controlled  Cont present treatment  Monitor labs  Recheck 6m      Arteriosclerotic cardiovascular disease  Pt asymptomatic  Continue present care  Recheck lipids now with Zetia added  F/u with cardio  Recheck 6m    Chronic ITP (idiopathic thrombocytopenia) (HCC)  Continue to monitor  F/u with Hem  Recheck 6m    Hyperlipidemia  Now on Zetia  Recheck labs  Continue present care  Monitor diet    Current mild episode of major depressive disorder without prior episode ([facility] [address] )  Depression Screening Follow-up Plan: Patient's depression screening was positive with a PHQ-2 score of 3  Their PHQ-9 score was 9  Patient assessed for underlying major depression  They have no active suicidal ideations  Brief counseling provided and recommend additional follow-up/re-evaluation next office visit  Pt believes that mood will impove once he can go outside the patient wants to watch for now  Recheck 3m if not improved - earlier if worse      Memory dysfunction  Perceived  MMSE 30/30  Suspect that this is related to his mood Counseled Recheck 3m if mood or memory not improving - earlier If worse       Diagnoses and all orders for this visit:    Essential hypertension  -     Comprehensive metabolic panel; Future    Arteriosclerotic cardiovascular disease  -     Comprehensive metabolic panel; Future    Mixed hyperlipidemia    Current mild episode of major depressive disorder without prior episode (HCC)  -     TSH, 3rd generation with Free T4 reflex; Future    Screening for colorectal cancer  -     Cologuard; Future    Chronic ITP (idiopathic thrombocytopenia) (HCC)          Subjective:      Patient ID: Lj Obrien is a 48 y o  male  f/u multiple med issues  - pt fell off of diet and gained some weight over the pandemic  Has started to work on diet again recently  Was laid off from work, but is notw working as an   - ankle/foot better with PT and orthotics    - has cut down on zolpidem - taking 1/2 dose at present  - up to date with Cardio  Pt now on zetia  Plays tennis frequently for exercise  Denies CP, palpitations, lightheadedness or other CV symptoms with or without exertion  - no new Gi or  complaints  - pt seen by Hem and diagnosed with chronic ITP  Pt under observation at this point  No rashes, bruising or signs of bleeding    - pt concerned that his short term memory has been noticeably poor over the last 4-6m  Pt had a change in job and experienced increased stress with some anhedonia/depressed mood No other neuro symptoms  Pt denies suicidal thought/ideation  - no other concerns      The following portions of the patient's history were reviewed and updated as appropriate:   He  has a past medical history of Acute angina ([facility] ), High blood pressure, Irregular heartbeat, ST elevation (STEMI) myocardial infarction of unspecified site [facility]), and Valvular heart disease  He   Patient Active Problem List    Diagnosis Date Noted    Current mild episode of major depressive disorder without prior episode ([place] [address] ) 02/21/2021    Memory dysfunction 02/21/2021    Chronic ITP (idiopathic thrombocytopenia) ([place] [address] ) 09/16/2020    Hx of CABG 12/13/2018    Hypertension 12/13/2018    Arteriosclerotic cardiovascular disease 08/02/2012    Hyperlipidemia 08/02/2012    Insomnia 08/02/2012     He  has a past surgical history that includes Coronary artery bypass graft; Coronary artery bypass graft (02/29/2012); Coronary angioplasty with stent (02/21/2012); Coronary angioplasty (02/21/2012); Cardiac surgery; and Vasectomy (10/27/2015)  He  reports that he has never smoked  He has never used smokeless tobacco  He reports previous alcohol use  He reports that he does not use drugs    Current Outpatient Medications   Medication Sig Dispense Refill    aspirin 81 mg chewable tablet Chew 1 tablet daily      ezetimibe (ZETIA) 10 mg tablet Take 1 tablet (10 mg total) by mouth daily 90 tablet 2    metoprolol succinate (TOPROL-XL) 25 mg 24 hr tablet take 1 tablet by mouth daily 90 tablet 3    Multiple Vitamins-Minerals (MULTIVITAMIN WITH MINERALS) tablet Take 2 tablets by mouth daily      rosuvastatin (CRESTOR) 20 MG tablet Take 1 tablet (20 mg total) by mouth daily 90 tablet 3    zolpidem (AMBIEN) 10 mg tablet Take 1 tablet (10 mg total) by mouth daily at bedtime as needed for sleep (Patient taking differently: Take 5 mg by mouth daily at bedtime as needed for sleep ) 30 tablet 1     No current facility-administered medications for this visit  He has No Known Allergies       Review of Systems   Constitutional: Negative  HENT: Negative  Eyes: Negative  Respiratory: Negative  Cardiovascular: Negative  Gastrointestinal: Negative  Endocrine: Negative  Genitourinary: Negative  Musculoskeletal: Negative  Skin: Negative  Allergic/Immunologic: Negative  Neurological: Negative  Hematological: Negative  Psychiatric/Behavioral: Positive for dysphoric mood  Labile memory         Objective:      /78   Pulse 64   Temp 98 7 °F (37 1 °C)   Ht 6' 2" (1 88 m)   Wt 108 kg (239 lb)   BMI 30 69 kg/m²          Physical Exam  Vitals signs reviewed  Constitutional:       Appearance: He is well-developed  HENT:      Head: Normocephalic and atraumatic  Right Ear: Tympanic membrane, ear canal and external ear normal       Left Ear: Tympanic membrane, ear canal and external ear normal    Eyes:      General: No scleral icterus  Conjunctiva/sclera: Conjunctivae normal       Pupils: Pupils are equal, round, and reactive to light  Neck:      Musculoskeletal: Normal range of motion and neck supple  No muscular tenderness  Thyroid: No thyromegaly  Vascular: No carotid bruit  Cardiovascular:      Rate and Rhythm: Normal rate and regular rhythm  Heart sounds: Normal heart sounds  No murmur     Pulmonary:      Effort: Pulmonary effort is normal  Breath sounds: Normal breath sounds  Abdominal:      General: Bowel sounds are normal  There is no distension  Palpations: Abdomen is soft  There is no mass  Tenderness: There is no abdominal tenderness  Musculoskeletal: Normal range of motion  General: No swelling, tenderness or deformity  Right lower leg: No edema  Left lower leg: No edema  Lymphadenopathy:      Cervical: No cervical adenopathy  Skin:     General: Skin is warm and dry  Capillary Refill: Capillary refill takes less than 2 seconds  Neurological:      Mental Status: He is alert and oriented to person, place, and time  Cranial Nerves: No cranial nerve deficit  Sensory: No sensory deficit  Motor: No weakness  Coordination: Coordination normal       Gait: Gait normal       Deep Tendon Reflexes: Reflexes normal       Comments: MMSE    Psychiatric:         Behavior: Behavior normal          Thought Content: Thought content normal          Judgment: Judgment normal       Comments: PHQ-9 Depression Screening    PHQ-9:   Frequency of the following problems over the past two weeks:      Little interest or pleasure in doing things: 1 - several days  Feeling down, depressed, or hopeless: 2 - more than half the days  Trouble falling or staying asleep, or sleeping too much: 0 - not at all  Feeling tired or having little energy: 1 - several days  Poor appetite or overeatin - more than half the days  Feeling bad about yourself - or that you are a failure or have let   yourself or your family down: 1 - several days  Trouble concentrating on things, such as reading the newspaper or watching   television: 1 - several days  Moving or speaking so slowly that other people could have noticed   Or the   opposite - being so fidgety or restless that you have been moving around a   lot more than usual: 1 - several days  Thoughts that you would be better off dead, or of hurting yourself in some   way: 0 - not at all  PHQ-2 Score: 3  PHQ-9 Score: 9

## 2021-02-21 PROBLEM — R23.3 EASY BRUISING: Status: RESOLVED | Noted: 2020-09-04 | Resolved: 2021-02-21

## 2021-02-21 PROBLEM — R23.8 EASY BRUISING: Status: RESOLVED | Noted: 2020-09-04 | Resolved: 2021-02-21

## 2021-02-21 PROBLEM — F32.0 CURRENT MILD EPISODE OF MAJOR DEPRESSIVE DISORDER WITHOUT PRIOR EPISODE (HCC): Status: ACTIVE | Noted: 2021-02-21

## 2021-02-21 PROBLEM — R41.3 MEMORY DYSFUNCTION: Status: ACTIVE | Noted: 2021-02-21

## 2021-02-21 NOTE — ASSESSMENT & PLAN NOTE
Depression Screening Follow-up Plan: Patient's depression screening was positive with a PHQ-2 score of 3  Their PHQ-9 score was 9  Patient assessed for underlying major depression  They have no active suicidal ideations  Brief counseling provided and recommend additional follow-up/re-evaluation next office visit  Pt believes that mood will impove once he can go outside the patient wants to watch for now   Recheck 3m if not improved - earlier if worse

## 2021-02-21 NOTE — ASSESSMENT & PLAN NOTE
Perceived  MMSE 30/30   Suspect that this is related to his mood Counseled Recheck 3m if mood or memory not improving - earlier If worse

## 2021-02-21 NOTE — ASSESSMENT & PLAN NOTE
Pt asymptomatic  Continue present care  Recheck lipids now with Zetia added  F/u with cardio   Recheck 6m

## 2021-03-10 DIAGNOSIS — Z23 ENCOUNTER FOR IMMUNIZATION: ICD-10-CM

## 2021-03-11 DIAGNOSIS — Z20.822 SUSPECTED COVID-19 VIRUS INFECTION: Primary | ICD-10-CM

## 2021-03-14 ENCOUNTER — NURSE TRIAGE (OUTPATIENT)
Dept: OTHER | Facility: OTHER | Age: 51
End: 2021-03-14

## 2021-03-14 DIAGNOSIS — Z20.822 SUSPECTED COVID-19 VIRUS INFECTION: ICD-10-CM

## 2021-03-14 PROCEDURE — U0003 INFECTIOUS AGENT DETECTION BY NUCLEIC ACID (DNA OR RNA); SEVERE ACUTE RESPIRATORY SYNDROME CORONAVIRUS 2 (SARS-COV-2) (CORONAVIRUS DISEASE [COVID-19]), AMPLIFIED PROBE TECHNIQUE, MAKING USE OF HIGH THROUGHPUT TECHNOLOGIES AS DESCRIBED BY CMS-2020-01-R: HCPCS | Performed by: FAMILY MEDICINE

## 2021-03-14 PROCEDURE — U0005 INFEC AGEN DETEC AMPLI PROBE: HCPCS | Performed by: FAMILY MEDICINE

## 2021-03-14 NOTE — TELEPHONE ENCOUNTER
Regarding: Covid test shortness of breath, cough, cold chills, sweats, fever, body aches& direct exposure  ----- Message from Italia Spangler sent at 3/14/2021  1:46 PM EDT -----  " My wife tested positive for covid on Wednesday and I have been experiencing shortness of breath, fever, cough, cold chills/sweats, body aches  I would like to be tested today if at all possible because my symptoms are concerning"

## 2021-03-14 NOTE — TELEPHONE ENCOUNTER
Covid test was already placed by provider  Gave care advice and offered virtual visit  Patient said he would follow up once he gets his test results  Emphasized to the patient any development of shortness of breath or chest pain warrants immediate evaluation in ED  Patient understood  Reason for Disposition   [1] COVID-19 infection suspected by caller or triager AND [2] mild symptoms (cough, fever, or others) AND [9] no complications or SOB    Answer Assessment - Initial Assessment Questions  1  Were you within 6 feet or less, for up to 15 minutes or more with a person that has a confirmed COVID-19 test?   Yes    2  What was the date of your exposure? Lives with wife who was tested positive on weds  3  Are you experiencing any symptoms attributed to the virus?  (Assess for SOB, cough, fever, difficulty breathing)   Temp today at 1:30pm was 100, very fatigued, muscle aches  Patient denies sob or chest pain/pressure  He does state he feels more winded and tired but not sob  4  HIGH RISK: Do you have any history heart or lung conditions, weakened immune system, diabetes, Asthma, CHF, HIV, COPD, Chemo, renal failure, sickle cell, etc?   Hx of cabg      Protocols used: CORONAVIRUS (COVID-19) DIAGNOSED OR SUSPECTED-ADULT-

## 2021-03-15 LAB — SARS-COV-2 RNA RESP QL NAA+PROBE: NEGATIVE

## 2021-03-16 ENCOUNTER — TELEPHONE (OUTPATIENT)
Dept: CARDIOLOGY CLINIC | Facility: CLINIC | Age: 51
End: 2021-03-16

## 2021-03-17 ENCOUNTER — TELEPHONE (OUTPATIENT)
Dept: FAMILY MEDICINE CLINIC | Facility: CLINIC | Age: 51
End: 2021-03-17

## 2021-03-17 DIAGNOSIS — Z20.822 EXPOSURE TO COVID-19 VIRUS: Primary | ICD-10-CM

## 2021-03-17 NOTE — PROGRESS NOTES
Pt called  Daughter involved in severe MVA In [facility] and is in the the ICU  Pt would like to fly out to be with her, but wife is recovering from [place]  He is asymptomatic, and had a negative test Monday  He had his first COVID vaccination and is due to have his second tomorrow  He has been quarantining  from his wife  - I explained that he still needs to quarantine until his wife is able to break isolation  He should call [facility] (where he is to get his second shot) to see if he can reschedule for early next week  He should also contact the hospital in [facility] to see if he would be allowed to see her given current restrictions  Finally, we will have him repeat testing on Friday to confirm that he is safe to travel and see his dtr  Pt understands all of the recommendations

## 2021-03-18 ENCOUNTER — TELEPHONE (OUTPATIENT)
Dept: CARDIOLOGY CLINIC | Facility: CLINIC | Age: 51
End: 2021-03-18

## 2021-03-19 DIAGNOSIS — Z20.822 EXPOSURE TO COVID-19 VIRUS: ICD-10-CM

## 2021-03-19 PROCEDURE — U0003 INFECTIOUS AGENT DETECTION BY NUCLEIC ACID (DNA OR RNA); SEVERE ACUTE RESPIRATORY SYNDROME CORONAVIRUS 2 (SARS-COV-2) (CORONAVIRUS DISEASE [COVID-19]), AMPLIFIED PROBE TECHNIQUE, MAKING USE OF HIGH THROUGHPUT TECHNOLOGIES AS DESCRIBED BY CMS-2020-01-R: HCPCS | Performed by: FAMILY MEDICINE

## 2021-03-19 PROCEDURE — U0005 INFEC AGEN DETEC AMPLI PROBE: HCPCS | Performed by: FAMILY MEDICINE

## 2021-03-20 LAB — SARS-COV-2 RNA RESP QL NAA+PROBE: NEGATIVE

## 2021-04-22 DIAGNOSIS — F51.01 PRIMARY INSOMNIA: ICD-10-CM

## 2021-04-22 RX ORDER — ZOLPIDEM TARTRATE 10 MG/1
TABLET ORAL
Qty: 30 TABLET | Refills: 2 | Status: SHIPPED | OUTPATIENT
Start: 2021-04-22 | End: 2021-04-22 | Stop reason: SDUPTHER

## 2021-04-22 NOTE — TELEPHONE ENCOUNTER
Last OV 2/19/2021  Next OV 9/8/2021 03/12/2021  1  10/30/2020  ZOLPIDEM TARTRATE 10 MG TABLET  30 0  30  [facility]  [id number]  THRIF ([id number])  1   [place]     01/29/2021  1  10/30/2020  ZOLPIDEM TARTRATE 10 MG TABLET  30 0  30  [facility]  [id number]  THRIF ([id number])  0   [place]     12/10/2020  1  10/30/2020  ZOLPIDEM TARTRATE 10 MG TABLET  30 0  30  [facility]  [id number]  THRIF ([id number])  1   [place]

## 2021-04-23 RX ORDER — ZOLPIDEM TARTRATE 10 MG/1
10 TABLET ORAL
Qty: 30 TABLET | Refills: 2 | Status: SHIPPED | OUTPATIENT
Start: 2021-04-23 | End: 2021-08-10 | Stop reason: SDUPTHER

## 2021-07-15 ENCOUNTER — APPOINTMENT (EMERGENCY)
Dept: CT IMAGING | Facility: HOSPITAL | Age: 51
End: 2021-07-15
Payer: COMMERCIAL

## 2021-07-15 ENCOUNTER — APPOINTMENT (EMERGENCY)
Dept: RADIOLOGY | Facility: HOSPITAL | Age: 51
End: 2021-07-15
Payer: COMMERCIAL

## 2021-07-15 ENCOUNTER — HOSPITAL ENCOUNTER (EMERGENCY)
Facility: HOSPITAL | Age: 51
Discharge: HOME | End: 2021-07-16
Attending: EMERGENCY MEDICINE | Admitting: EMERGENCY MEDICINE
Payer: COMMERCIAL

## 2021-07-15 DIAGNOSIS — R00.2 PALPITATIONS: Primary | ICD-10-CM

## 2021-07-15 DIAGNOSIS — Z86.79 HISTORY OF HIGH BLOOD PRESSURE: ICD-10-CM

## 2021-07-15 LAB
ALBUMIN SERPL BCP-MCNC: 4.4 G/DL (ref 3.5–5)
ALP SERPL-CCNC: 72 U/L (ref 46–116)
ALT SERPL W P-5'-P-CCNC: 32 U/L (ref 12–78)
ANION GAP SERPL CALCULATED.3IONS-SCNC: 9 MMOL/L (ref 4–13)
APTT PPP: 23 SECONDS (ref 23–37)
AST SERPL W P-5'-P-CCNC: 21 U/L (ref 5–45)
BASOPHILS # BLD AUTO: 0.02 THOUSANDS/ΜL (ref 0–0.1)
BASOPHILS NFR BLD AUTO: 0 % (ref 0–1)
BILIRUB SERPL-MCNC: 0.38 MG/DL (ref 0.2–1)
BUN SERPL-MCNC: 18 MG/DL (ref 5–25)
CALCIUM SERPL-MCNC: 9.5 MG/DL (ref 8.3–10.1)
CHLORIDE SERPL-SCNC: 107 MMOL/L (ref 100–108)
CO2 SERPL-SCNC: 29 MMOL/L (ref 21–32)
CREAT SERPL-MCNC: 1.15 MG/DL (ref 0.6–1.3)
D DIMER PPP FEU-MCNC: 0.77 UG/ML FEU
EOSINOPHIL # BLD AUTO: 0.09 THOUSAND/ΜL (ref 0–0.61)
EOSINOPHIL NFR BLD AUTO: 1 % (ref 0–6)
ERYTHROCYTE [DISTWIDTH] IN BLOOD BY AUTOMATED COUNT: 12.6 % (ref 11.6–15.1)
GFR SERPL CREATININE-BSD FRML MDRD: 73 ML/MIN/1.73SQ M
GLUCOSE SERPL-MCNC: 82 MG/DL (ref 65–140)
HCT VFR BLD AUTO: 45.3 % (ref 36.5–49.3)
HGB BLD-MCNC: 15.4 G/DL (ref 12–17)
IMM GRANULOCYTES # BLD AUTO: 0.02 THOUSAND/UL (ref 0–0.2)
IMM GRANULOCYTES NFR BLD AUTO: 0 % (ref 0–2)
INR PPP: 0.94 (ref 0.84–1.19)
LYMPHOCYTES # BLD AUTO: 1.62 THOUSANDS/ΜL (ref 0.6–4.47)
LYMPHOCYTES NFR BLD AUTO: 23 % (ref 14–44)
MCH RBC QN AUTO: 32 PG (ref 26.8–34.3)
MCHC RBC AUTO-ENTMCNC: 34 G/DL (ref 31.4–37.4)
MCV RBC AUTO: 94 FL (ref 82–98)
MONOCYTES # BLD AUTO: 0.66 THOUSAND/ΜL (ref 0.17–1.22)
MONOCYTES NFR BLD AUTO: 9 % (ref 4–12)
NEUTROPHILS # BLD AUTO: 4.63 THOUSANDS/ΜL (ref 1.85–7.62)
NEUTS SEG NFR BLD AUTO: 67 % (ref 43–75)
NRBC BLD AUTO-RTO: 0 /100 WBCS
NT-PROBNP SERPL-MCNC: 23 PG/ML
PLATELET # BLD AUTO: 140 THOUSANDS/UL (ref 149–390)
PMV BLD AUTO: 11.2 FL (ref 8.9–12.7)
POTASSIUM SERPL-SCNC: 3.8 MMOL/L (ref 3.5–5.3)
PROT SERPL-MCNC: 8.3 G/DL (ref 6.4–8.2)
PROTHROMBIN TIME: 12.6 SECONDS (ref 11.6–14.5)
RBC # BLD AUTO: 4.82 MILLION/UL (ref 3.88–5.62)
SODIUM SERPL-SCNC: 145 MMOL/L (ref 136–145)
TROPONIN I SERPL-MCNC: <0.02 NG/ML
WBC # BLD AUTO: 7.04 THOUSAND/UL (ref 4.31–10.16)

## 2021-07-15 PROCEDURE — 99285 EMERGENCY DEPT VISIT HI MDM: CPT | Performed by: PHYSICIAN ASSISTANT

## 2021-07-15 PROCEDURE — 85025 COMPLETE CBC W/AUTO DIFF WBC: CPT | Performed by: EMERGENCY MEDICINE

## 2021-07-15 PROCEDURE — 84484 ASSAY OF TROPONIN QUANT: CPT | Performed by: EMERGENCY MEDICINE

## 2021-07-15 PROCEDURE — 93005 ELECTROCARDIOGRAM TRACING: CPT

## 2021-07-15 PROCEDURE — 71275 CT ANGIOGRAPHY CHEST: CPT

## 2021-07-15 PROCEDURE — 85610 PROTHROMBIN TIME: CPT | Performed by: EMERGENCY MEDICINE

## 2021-07-15 PROCEDURE — 71045 X-RAY EXAM CHEST 1 VIEW: CPT

## 2021-07-15 PROCEDURE — 36415 COLL VENOUS BLD VENIPUNCTURE: CPT

## 2021-07-15 PROCEDURE — G1004 CDSM NDSC: HCPCS

## 2021-07-15 PROCEDURE — 83880 ASSAY OF NATRIURETIC PEPTIDE: CPT | Performed by: EMERGENCY MEDICINE

## 2021-07-15 PROCEDURE — 80053 COMPREHEN METABOLIC PANEL: CPT | Performed by: EMERGENCY MEDICINE

## 2021-07-15 PROCEDURE — 85730 THROMBOPLASTIN TIME PARTIAL: CPT | Performed by: EMERGENCY MEDICINE

## 2021-07-15 PROCEDURE — 85379 FIBRIN DEGRADATION QUANT: CPT | Performed by: PHYSICIAN ASSISTANT

## 2021-07-15 PROCEDURE — 99285 EMERGENCY DEPT VISIT HI MDM: CPT

## 2021-07-15 RX ADMIN — IOHEXOL 85 ML: 350 INJECTION, SOLUTION INTRAVENOUS at 22:55

## 2021-07-16 VITALS
DIASTOLIC BLOOD PRESSURE: 67 MMHG | WEIGHT: 249.34 LBS | HEIGHT: 74 IN | BODY MASS INDEX: 32 KG/M2 | SYSTOLIC BLOOD PRESSURE: 123 MMHG | RESPIRATION RATE: 16 BRPM | HEART RATE: 80 BPM | TEMPERATURE: 98.5 F | OXYGEN SATURATION: 95 %

## 2021-07-16 LAB
ATRIAL RATE: 79 BPM
ATRIAL RATE: 82 BPM
P AXIS: 62 DEGREES
P AXIS: 72 DEGREES
PR INTERVAL: 192 MS
PR INTERVAL: 202 MS
QRS AXIS: 37 DEGREES
QRS AXIS: 46 DEGREES
QRSD INTERVAL: 104 MS
QRSD INTERVAL: 104 MS
QT INTERVAL: 358 MS
QT INTERVAL: 376 MS
QTC INTERVAL: 410 MS
QTC INTERVAL: 439 MS
T WAVE AXIS: 51 DEGREES
T WAVE AXIS: 68 DEGREES
TROPONIN I SERPL-MCNC: 0.04 NG/ML
VENTRICULAR RATE: 79 BPM
VENTRICULAR RATE: 82 BPM

## 2021-07-16 PROCEDURE — 36415 COLL VENOUS BLD VENIPUNCTURE: CPT | Performed by: PHYSICIAN ASSISTANT

## 2021-07-16 PROCEDURE — 84484 ASSAY OF TROPONIN QUANT: CPT | Performed by: PHYSICIAN ASSISTANT

## 2021-07-16 PROCEDURE — 93010 ELECTROCARDIOGRAM REPORT: CPT | Performed by: INTERNAL MEDICINE

## 2021-07-16 PROCEDURE — 93005 ELECTROCARDIOGRAM TRACING: CPT

## 2021-07-16 RX ORDER — METOPROLOL SUCCINATE 25 MG/1
25 TABLET, EXTENDED RELEASE ORAL DAILY
Qty: 7 TABLET | Refills: 0 | Status: SHIPPED | OUTPATIENT
Start: 2021-07-16 | End: 2021-08-23 | Stop reason: SDUPTHER

## 2021-07-16 NOTE — DISCHARGE INSTRUCTIONS
Take metoprolol as indicated by provider  Follow-up with cardiology  Follow-up with PCP  Follow up emergency department if symptoms persist or exacerbate

## 2021-07-16 NOTE — ED PROVIDER NOTES
History  Chief Complaint   Patient presents with    Palpitations     pt reports his watch reported his heart rate was in the 170s throughout the day today, typically HR is in the 50s, states he was playing tennis today  complaining of shortness of breath on exertion, denies cp  also states he has not been taking his metoprolol for 3 days  Patient is a 49-year-old male with history of STEMI, open heart quad bypass, the presents emergency department with palpitations 2 hours ago  Patient states that the palpitations had lasted for approximately 15 minutes while he was playing doubles tennis "  Patient also reports that his apple heart watch had kept abated his heart rate a maximum of 170 beats per minute  Patient is concerned with his elevated heart rate and came to emergency department for further evaluation  Patient also states that he has not taken his metoprolol given that it is still in mail transit  Patient denies any pain at this time  Patient denies palliative and provocative factors  Patient denies not effective treatment  Patient denies fevers, chills, nausea, vomiting, diarrhea, constipation urinary symptoms  Patient's recent fall or recent trauma  Patient denies DVT, PE, and thrombophlebitis  Patient denies sick contacts or recent travel  Patient denies chest pain, shortness of breath, abdominal pain  History provided by:  Patient   used: No        Prior to Admission Medications   Prescriptions Last Dose Informant Patient Reported? Taking?    Multiple Vitamins-Minerals (MULTIVITAMIN WITH MINERALS) tablet 7/15/2021 at Unknown time Self Yes Yes   Sig: Take 2 tablets by mouth daily   aspirin 81 mg chewable tablet 7/14/2021 at Unknown time Self Yes Yes   Sig: Chew 1 tablet daily   ezetimibe (ZETIA) 10 mg tablet 7/15/2021 at Unknown time  No Yes   Sig: Take 1 tablet (10 mg total) by mouth daily   metoprolol succinate (TOPROL-XL) 25 mg 24 hr tablet Not Taking at Unknown time Self No No   Sig: take 1 tablet by mouth daily   Patient not taking: Reported on 7/15/2021   rosuvastatin (CRESTOR) 20 MG tablet 7/15/2021 at Unknown time  No Yes   Sig: Take 1 tablet (20 mg total) by mouth daily   zolpidem (AMBIEN) 10 mg tablet 7/15/2021 at Unknown time  No Yes   Sig: Take 1 tablet (10 mg total) by mouth daily at bedtime as needed for sleep      Facility-Administered Medications: None       Past Medical History:   Diagnosis Date    Acute angina (HCC)     High blood pressure     NOT HYPERTENSION    Irregular heartbeat     ST elevation (STEMI) myocardial infarction of unspecified site ([facility] [address] )     Valvular heart disease        Past Surgical History:   Procedure Laterality Date    CARDIAC SURGERY      OPEN HEART QUAD BYPASS; RESOLVED: 2012    CORONARY ANGIOPLASTY  02/21/2012    CORONARY ANGIOGRAPHY WITHOUT CONCOMITANT LEFT HEART CATHETERIZATION; 90% PROXIMAL LAD, 90% 1ST DIAG OSTIUM AND 95% PROXIMAL THIRD, 99% PROXIMAL RAMUS INTERMEDIUS    CORONARY ANGIOPLASTY WITH STENT PLACEMENT  02/21/2012    4 VESSEL DISEASE TO HAVE CABG; MANAGED BY: SHABBIR SNYDER    CORONARY ARTERY BYPASS GRAFT      LAST ASSESSED: 11/17/17    CORONARY ARTERY BYPASS GRAFT  02/29/2012    CABG X4 (SVG TO 1ST DIAGNOL, SVG TO 3RD OBTUSE MARGINAL, FREE RADIAL ARTERY TO RAMUS INTERMEDIUS, SULLIVAN TO LAD) BY DR Jazmín Joaquin 2012     VASECTOMY  10/27/2015    SURGERY VAS DEFERENS; MANAGED BY: Meño Cuenca; LAST ASSESSED: 10/27/15       Family History   Problem Relation Age of Onset    Coronary artery disease Mother     Heart disease Mother     Hypertension Mother      I have reviewed and agree with the history as documented      E-Cigarette/Vaping    E-Cigarette Use Never User      E-Cigarette/Vaping Substances    Nicotine No     THC No     CBD No     Flavoring No     Other No     Unknown No      Social History     Tobacco Use    Smoking status: Never Smoker    Smokeless tobacco: Never Used   Vaping Use    Vaping Use: Never used   Substance Use Topics    Alcohol use: Not Currently     Comment: BEING A SOCIAL DRINKER: <= 1 DRINK/WEEK    Drug use: Never       Review of Systems   Constitutional: Negative for activity change, appetite change, chills and fever  HENT: Negative for congestion, postnasal drip, rhinorrhea, sinus pressure, sinus pain, sore throat and tinnitus  Eyes: Negative for photophobia and visual disturbance  Respiratory: Negative for cough, chest tightness and shortness of breath  Cardiovascular: Positive for palpitations  Negative for chest pain  Gastrointestinal: Negative for abdominal pain, constipation, diarrhea, nausea and vomiting  Genitourinary: Negative for difficulty urinating, dysuria, flank pain, frequency and urgency  Musculoskeletal: Negative for back pain, gait problem, neck pain and neck stiffness  Skin: Negative for pallor and rash  Allergic/Immunologic: Negative for environmental allergies and food allergies  Neurological: Negative for dizziness, weakness, numbness and headaches  Psychiatric/Behavioral: Negative for confusion  All other systems reviewed and are negative  Physical Exam  Physical Exam  Vitals and nursing note reviewed  Constitutional:       General: He is awake  Appearance: Normal appearance  He is well-developed  He is not ill-appearing, toxic-appearing or diaphoretic  Comments: /89 (BP Location: Right arm)   Pulse 80   Temp 98 5 °F (36 9 °C) (Oral)   Resp 18   Ht 6' 2" (1 88 m)   Wt 113 kg (249 lb 5 4 oz)   SpO2 97%   BMI 32 01 kg/m²      HENT:      Head: Normocephalic and atraumatic  Right Ear: Hearing and external ear normal  No decreased hearing noted  No drainage, swelling or tenderness  No mastoid tenderness  Left Ear: Hearing and external ear normal  No decreased hearing noted  No drainage, swelling or tenderness  No mastoid tenderness  Nose: Nose normal       Mouth/Throat:      Lips: Pink  Mouth: Mucous membranes are moist       Pharynx: Oropharynx is clear  Uvula midline  Eyes:      General: Lids are normal  Vision grossly intact  Right eye: No discharge  Left eye: No discharge  Extraocular Movements: Extraocular movements intact  Conjunctiva/sclera: Conjunctivae normal       Pupils: Pupils are equal, round, and reactive to light  Neck:      Vascular: No JVD  Trachea: Trachea and phonation normal  No tracheal tenderness or tracheal deviation  Cardiovascular:      Rate and Rhythm: Normal rate and regular rhythm  Pulses: Normal pulses  Radial pulses are 2+ on the right side and 2+ on the left side  Posterior tibial pulses are 2+ on the right side and 2+ on the left side  Heart sounds: Normal heart sounds  Pulmonary:      Effort: Pulmonary effort is normal       Breath sounds: Normal breath sounds  No stridor  No decreased breath sounds, wheezing, rhonchi or rales  Chest:      Chest wall: No tenderness  Abdominal:      General: Abdomen is flat  Bowel sounds are normal  There is no distension  Palpations: Abdomen is soft  Abdomen is not rigid  Tenderness: There is no abdominal tenderness  There is no guarding or rebound  Musculoskeletal:         General: Normal range of motion  Cervical back: Full passive range of motion without pain, normal range of motion and neck supple  No rigidity  No spinous process tenderness or muscular tenderness  Normal range of motion  Lymphadenopathy:      Head:      Right side of head: No submental, submandibular, tonsillar, preauricular, posterior auricular or occipital adenopathy  Left side of head: No submental, submandibular, tonsillar, preauricular, posterior auricular or occipital adenopathy  Cervical: No cervical adenopathy  Right cervical: No superficial, deep or posterior cervical adenopathy       Left cervical: No superficial, deep or posterior cervical adenopathy  Skin:     General: Skin is warm  Capillary Refill: Capillary refill takes less than 2 seconds  Neurological:      General: No focal deficit present  Mental Status: He is alert and oriented to person, place, and time  GCS: GCS eye subscore is 4  GCS verbal subscore is 5  GCS motor subscore is 6  Sensory: No sensory deficit  Deep Tendon Reflexes: Reflexes are normal and symmetric  Reflex Scores:       Patellar reflexes are 2+ on the right side and 2+ on the left side  Psychiatric:         Mood and Affect: Mood normal          Speech: Speech normal          Behavior: Behavior normal  Behavior is cooperative  Thought Content:  Thought content normal          Judgment: Judgment normal          Vital Signs  ED Triage Vitals [07/15/21 2154]   Temperature Pulse Respirations Blood Pressure SpO2   98 5 °F (36 9 °C) 80 18 154/89 97 %      Temp Source Heart Rate Source Patient Position - Orthostatic VS BP Location FiO2 (%)   Oral Monitor Lying Right arm --      Pain Score       No Pain           Vitals:    07/15/21 2154 07/16/21 0000 07/16/21 0100 07/16/21 0145   BP: 154/89 135/81 138/84 123/67   Pulse: 80 82 80 80   Patient Position - Orthostatic VS: Lying Lying Lying Lying         Visual Acuity      ED Medications  Medications   iohexol (OMNIPAQUE) 350 MG/ML injection (SINGLE-DOSE) 100 mL (85 mL Intravenous Given 7/15/21 2255)       Diagnostic Studies  Results Reviewed     Procedure Component Value Units Date/Time    Troponin I [[id number]]  (Normal) Collected: 07/16/21 0059    Lab Status: Final result Specimen: Blood from Arm, Left Updated: 07/16/21 0125     Troponin I 0 04 ng/mL     D-dimer, quantitative [[id number]]  (Abnormal) Collected: 07/15/21 2158    Lab Status: Final result Specimen: Blood from Arm, Left Updated: 07/15/21 2238     D-Dimer, Quant 0 77 ug/ml FEU     NT-BNP PRO [[id number]]  (Normal) Collected: 07/15/21 2158    Lab Status: Final result Specimen: Blood from Arm, Left Updated: 07/15/21 2232     NT-proBNP 23 pg/mL     Troponin I [[id number]]  (Normal) Collected: 07/15/21 2158    Lab Status: Final result Specimen: Blood from Arm, Left Updated: 07/15/21 2229     Troponin I <0 02 ng/mL     Comprehensive metabolic panel [[id number]]  (Abnormal) Collected: 07/15/21 2158    Lab Status: Final result Specimen: Blood from Arm, Left Updated: 07/15/21 2225     Sodium 145 mmol/L      Potassium 3 8 mmol/L      Chloride 107 mmol/L      CO2 29 mmol/L      ANION GAP 9 mmol/L      BUN 18 mg/dL      Creatinine 1 15 mg/dL      Glucose 82 mg/dL      Calcium 9 5 mg/dL      AST 21 U/L      ALT 32 U/L      Alkaline Phosphatase 72 U/L      Total Protein 8 3 g/dL      Albumin 4 4 g/dL      Total Bilirubin 0 38 mg/dL      eGFR 73 ml/min/1 73sq m     Narrative:      [place] guidelines for Chronic Kidney Disease (CKD):     Stage 1 with normal or high GFR (GFR > 90 mL/min/1 73 square meters)    Stage 2 Mild CKD (GFR = 60-89 mL/min/1 73 square meters)    Stage 3A Moderate CKD (GFR = 45-59 mL/min/1 73 square meters)    Stage 3B Moderate CKD (GFR = 30-44 mL/min/1 73 square meters)    Stage 4 Severe CKD (GFR = 15-29 mL/min/1 73 square meters)    Stage 5 End Stage CKD (GFR <15 mL/min/1 73 square meters)  Note: GFR calculation is accurate only with a steady state creatinine    APTT [[id number]]  (Normal) Collected: 07/15/21 2158    Lab Status: Final result Specimen: Blood from Arm, Left Updated: 07/15/21 2220     PTT 23 seconds     Protime-INR [[id number]]  (Normal) Collected: 07/15/21 2158    Lab Status: Final result Specimen: Blood from Arm, Left Updated: 07/15/21 2220     Protime 12 6 seconds      INR 0 94    CBC and differential [[id number]]  (Abnormal) Collected: 07/15/21 2158    Lab Status: Final result Specimen: Blood from Arm, Left Updated: 07/15/21 2212     WBC 7 04 Thousand/uL      RBC 4 82 Million/uL      Hemoglobin 15 4 g/dL      Hematocrit 45 3 %      MCV 94 fL      MCH 32 0 pg      MCHC 34 0 g/dL      RDW 12 6 %      MPV 11 2 fL      Platelets 464 Thousands/uL      nRBC 0 /100 WBCs      Neutrophils Relative 67 %      Immat GRANS % 0 %      Lymphocytes Relative 23 %      Monocytes Relative 9 %      Eosinophils Relative 1 %      Basophils Relative 0 %      Neutrophils Absolute 4 63 Thousands/µL      Immature Grans Absolute 0 02 Thousand/uL      Lymphocytes Absolute 1 62 Thousands/µL      Monocytes Absolute 0 66 Thousand/µL      Eosinophils Absolute 0 09 Thousand/µL      Basophils Absolute 0 02 Thousands/µL                  CTA ED chest PE study   ED Interpretation by Verena Carlisle PA-C (07/15 3609)   Isamar Bello MD  [phone] 7/15/2021     Narrative & Impression  CTA - CHEST WITH IV CONTRAST - PULMONARY ANGIOGRAM     INDICATION:   PE suspected, intermediate prob, positive D-dimer  palpitations and elevated ddimer      COMPARISON: 9/20/2015      TECHNIQUE: CTA examination of the chest was performed using angiographic technique according to a protocol specifically tailored to evaluate for pulmonary embolism  Axial, sagittal, and coronal 2D reformatted images were created from the source data and   submitted for interpretation  In addition, coronal 3D MIP postprocessing was performed on the acquisition scanner        Radiation dose length product (DLP) for this visit:  763 mGy-cm   This examination, like all CT scans performed in the [facility], was performed utilizing techniques to minimize radiation dose exposure, including the use of iterative   reconstruction and automated exposure control      IV Contrast:  85 mL of iohexol (OMNIPAQUE)     FINDINGS:        PULMONARY ARTERIAL TREE:  No pulmonary embolus is seen       LUNGS:  No consolidation or suspicious pulmonary nodule  Patent tracheobronchial tree  There is no tracheal or endobronchial lesion      PLEURA:  Unremarkable      HEART/GREAT VESSELS:  No cardiomegaly or pericardial effusion  Post CABG      MEDIASTINUM AND EMILY:  Unremarkable      CHEST WALL AND LOWER NECK:   Post median sternotomy      VISUALIZED STRUCTURES IN THE UPPER ABDOMEN:  Cholelithiasis      OSSEOUS STRUCTURES:  No acute fracture or destructive osseous lesion      IMPRESSION:     No evidence of acute pulmonary embolus      No consolidation or effusion            Workstation performed: [id number]           Final Result by Kris Naqvi MD (07/15 5920)      No evidence of acute pulmonary embolus  No consolidation or effusion  Workstation performed: [id number]         XR chest 1 view portable    (Results Pending)              Procedures  ECG 12 Lead Documentation Only    Date/Time: 7/15/2021 9:57 PM  Performed by: Leanne Fiore PA-C  Authorized by: Leanne Fiore PA-C     Indications / Diagnosis:  Palpitations  ECG reviewed by me, the ED Provider: yes    Patient location:  ED  Previous ECG:     Previous ECG:  Compared to current    Comparison ECG info: When compared with ECG of September 19, 2015 no significant changes were noted    Similarity:  No change    Comparison to cardiac monitor: Yes    Interpretation:     Interpretation: normal    Rate:     ECG rate:  79    ECG rate assessment: normal    Rhythm:     Rhythm: sinus rhythm    Ectopy:     Ectopy: none    QRS:     QRS axis:  Normal    QRS intervals:  Normal  Conduction:     Conduction: normal    ST segments:     ST segments:  Normal  T waves:     T waves: normal      ECG 12 Lead Documentation Only    Date/Time: 7/16/2021 12:56 AM  Performed by: Leanne Fiore PA-C  Authorized by: Leanne Fiore PA-C     Indications / Diagnosis:  Palpitations  ECG reviewed by me, the ED Provider: yes    Patient location:  ED  Previous ECG:     Previous ECG:  Compared to current    Comparison ECG info:   When compared with ECG of July 15th 2021 at 9:57 p m  no significant change was found    Similarity:  No change    Comparison to cardiac monitor: Yes    Interpretation: Interpretation: normal    Rate:     ECG rate:  82    ECG rate assessment: normal    Rhythm:     Rhythm: sinus rhythm    Ectopy:     Ectopy: none    QRS:     QRS axis:  Normal    QRS intervals:  Normal  Conduction:     Conduction: normal    ST segments:     ST segments:  Normal  T waves:     T waves: normal               ED Course  ED Course as of Jul 16 0405   u Jul 15, 2021   2216 Patient states that he had ran out of his metoprolol and had not taken his prescribed dosages for 3 days  Patient denies chest pain  Patient states that he had a quadruple bypass in the past and only takes 81 mg of aspirin "religiously "  Patient denies history of DVT, PE, thrombophlebitis  Patient states that he had noted that his apple watch had noted 170 beats per minute while he was playing doubles in tennis                HEART Risk Score      Most Recent Value   Heart Score Risk Calculator   History  0 Filed at: 07/16/2021 0402   ECG  0 Filed at: 07/16/2021 0402   Age  1 Filed at: 07/16/2021 0402   Risk Factors  2 Filed at: 07/16/2021 0402   Troponin  0 Filed at: 07/16/2021 0402   HEART Score  3 Filed at: 07/16/2021 0402              PERC Rule for PE      Most Recent Value   PERC Rule for PE   Age >=50  1 Filed at: 07/15/2021 2248   HR >=100  0 Filed at: 07/15/2021 2248   O2 Sat on room air < 95%  0 Filed at: 07/15/2021 2248   History of PE or DVT  0 Filed at: 07/15/2021 2248   Recent trauma or surgery  0 Filed at: 07/15/2021 2248   Hemoptysis  0 Filed at: 07/15/2021 2248   Exogenous estrogen  0 Filed at: 07/15/2021 2248   Unilateral leg swelling  0 Filed at: 07/15/2021 2248   PERC Rule for PE Results  1 Filed at: 07/15/2021 2248              SBIRT 20yo+      Most Recent Value   SBIRT (23 yo +)   In order to provide better care to our patients, we are screening all of our patients for alcohol and drug use  Would it be okay to ask you these screening questions?   No Filed at: 07/15/2021 2204            Wells' Criteria for DVT Most Recent Value   José Miguel' Criteria for DVT   Active cancer Treatment or palliation within 6 months  0 Filed at: 07/15/2021 2248   Bedridden recently >3 days or major surgery within 12 weeks  0 Filed at: 07/15/2021 2248   Calf swelling >3 cm compared to the other leg  0 Filed at: 07/15/2021 2248   Entire leg swollen  0 Filed at: 07/15/2021 2248   Collateral (nonvaricose) superficial veins present  0 Filed at: 07/15/2021 2248   Localized tenderness along the deep venous system  0 Filed at: 07/15/2021 2248   Pitting edema, confined to symptomatic leg  0 Filed at: 07/15/2021 2248   Paralysis, paresis, or recent plaster immobilization of the lower extremity  0 Filed at: 07/15/2021 2248   Previously documented DVT  0 Filed at: 07/15/2021 2248   Alternative diagnosis to DVT as likely or more likely  0 Filed at: 07/15/2021 2248   José Miguel DVT Critera Score  0 Filed at: 07/15/2021 2248              MDM  Number of Diagnoses or Management Options  History of high blood pressure: new and does not require workup  Palpitations: new and does not require workup     Amount and/or Complexity of Data Reviewed  Clinical lab tests: ordered and reviewed  Tests in the radiology section of CPT®: ordered and reviewed  Review and summarize past medical records: yes  Independent visualization of images, tracings, or specimens: yes    Risk of Complications, Morbidity, and/or Mortality  Presenting problems: moderate  Diagnostic procedures: moderate  Management options: low    Patient Progress  Patient progress: stable     Patient is a 51-year-old male with history of STEMI, open heart quad bypass, the presents emergency department with palpitations 2 hours ago  Patient states that the palpitations had lasted for approximately 15 minutes while he was playing doubles tennis "  Patient also reports that his apple heart watch had kept abated his heart rate a maximum of 170 beats per minute    Patient is concerned with his elevated heart rate and came to emergency department for further evaluation  Patient hemodynamically stable and afebrile  Hemodynamically stable and afebrile  No leukocytosis, no banding with systemic infection unlikely  negative lipase with acute pancreatitis unlikely  Normal liver enzymes, normal total bilirubin, and normal alk-phos with hepatobiliary pathology not likely  Initial read on chest x-ray no acute cardiopulmonary disease on initial read  Initial troponin negative, delta troponin negative  Initial ECG normal sinus rhythm delta ECG normal sinus rhythm   Wells 0, Perc 1  Positive D-dimer- CTA ed chest pe study- impression- "   No evidence of acute pulmonary embolus  No consolidation or effusion "  Heart 3  Prescribed 1 week of patient's previously prescribed metoprolol and counseled patient on medication administration and side effects  Follow-up with cardiology for ordered exercise stress test  Follow-up with PCP  Follow up with emergency department symptoms persist or exacerbate  Patient demonstrates verbal understanding of all clinical laboratory and imaging findings, discharge instructions, follow-up, and treatment plan    Disposition  Final diagnoses:   Palpitations   History of high blood pressure     Time reflects when diagnosis was documented in both MDM as applicable and the Disposition within this note     Time User Action Codes Description Comment    7/16/2021  1:34 AM Marcelo [place] Add [R00 2] Palpitations     7/16/2021  1:34 AM Marcelo [place] Add [Z86 79] History of hypertension     7/16/2021  1:34 AM Marcelo [place] Remove [Z86 79] History of hypertension     7/16/2021  1:35 AM Marcelo [place] Add [Z86 79] History of high blood pressure       ED Disposition     ED Disposition Condition Date/Time Comment    Discharge Stable Fri Jul 16, 2021  1:32 AM Kristian Otero discharge to home/self care              Follow-up Information     Follow up With Specialties Details Why Contact Info Additional Information Wendy Glover MD Family Medicine   [address]  [address] [facility] Emergency Department Emergency Medicine   [address] [address] [facility] Emergency Department, [address], [place], [place], [address] [place], [place] Cardiology   [address]  [address]  [address] [facility]  [phone]             Discharge Medication List as of 7/16/2021  1:53 AM      START taking these medications    Details   !! metoprolol succinate (TOPROL-XL) 25 mg 24 hr tablet Take 1 tablet (25 mg total) by mouth daily for 7 days, Starting Fri 7/16/2021, Until Fri 7/23/2021, Normal       !! - Potential duplicate medications found  Please discuss with provider  CONTINUE these medications which have NOT CHANGED    Details   aspirin 81 mg chewable tablet Chew 1 tablet daily, Historical Med      ezetimibe (ZETIA) 10 mg tablet Take 1 tablet (10 mg total) by mouth daily, Starting Fri 1/29/2021, Normal      Multiple Vitamins-Minerals (MULTIVITAMIN WITH MINERALS) tablet Take 2 tablets by mouth daily, Historical Med      rosuvastatin (CRESTOR) 20 MG tablet Take 1 tablet (20 mg total) by mouth daily, Starting Fri 1/29/2021, Until Sat 1/29/2022, Normal      zolpidem (AMBIEN) 10 mg tablet Take 1 tablet (10 mg total) by mouth daily at bedtime as needed for sleep, Starting Fri 4/23/2021, Normal      !! metoprolol succinate (TOPROL-XL) 25 mg 24 hr tablet take 1 tablet by mouth daily, Normal       !! - Potential duplicate medications found  Please discuss with provider  No discharge procedures on file      PDMP Review       Value Time User    PDMP Reviewed  Yes 4/23/2021  6:49 AM Wendy Glover MD          ED Provider  Electronically Signed by           Geronimo Kussmaul, PA-C  07/16/21 9310

## 2021-07-21 ENCOUNTER — OFFICE VISIT (OUTPATIENT)
Dept: CARDIAC SURGERY | Facility: CLINIC | Age: 51
End: 2021-07-21
Payer: COMMERCIAL

## 2021-07-21 VITALS
RESPIRATION RATE: 16 BRPM | OXYGEN SATURATION: 99 % | DIASTOLIC BLOOD PRESSURE: 82 MMHG | HEART RATE: 56 BPM | TEMPERATURE: 98.6 F | WEIGHT: 247.2 LBS | SYSTOLIC BLOOD PRESSURE: 122 MMHG | BODY MASS INDEX: 31.73 KG/M2 | HEIGHT: 74 IN

## 2021-07-21 DIAGNOSIS — R00.2 PALPITATIONS: ICD-10-CM

## 2021-07-21 DIAGNOSIS — E78.2 MIXED HYPERLIPIDEMIA: ICD-10-CM

## 2021-07-21 DIAGNOSIS — Z95.1 S/P CABG X 4: Primary | ICD-10-CM

## 2021-07-21 PROCEDURE — 99214 OFFICE O/P EST MOD 30 MIN: CPT | Performed by: INTERNAL MEDICINE

## 2021-07-21 NOTE — PROGRESS NOTES
Cardiology Follow Up Visit    David Heredia  1970  [id number]  [address]  [phone] [address] [address]  [phone]    1  S/P CABG x 4 ( Saphenous vein graft to diagonal 1, saphenous vein graft to OM3, radial to ramus intermedius, LIMA to LAD) 2012     2  Palpitations     3  Mixed hyperlipidemia           Discussion/Summary:    1  CAD  A  CABG x 4 2012, nm stress surveillance normal 2020, normal lvef    2  Hyperlipidemia, LDL 80s last check, started Zetia, no lipid profile since     3  Recent palpitations off Toprol  For 3 days sudden    Plan: Patient with elevated heart rate while playing doubles tennis  iWatch with  then back to 90 after finished  Was off his metoprolol for will a few days as he ran out had been on beta-blockers for number of years     This perhaps was evidence of beta-blocker withdrawal and reflex tachycardia  There is no evidence of AFib/ SVT in the ED    Instructed if should occur again despite reinstituting Toprol that would place a ZIO patch or longer-term monitoring at that time     He feels well now  Has no wall exercise limitations  Repeat lipid profile requested with the addition of Zetia to achieve LDL goal less than 70 given his known coronary artery disease and early CABG  Sensible/Mediterranean diet  Encouraged as well as continue exercise which he does    Interval History:     unscheduled visit for this 63-year-old gentleman with CABG x4 2012 for his I watch telling him that his heart rate was 170 after being off Toprol for few days as was stuck in shipping holding pattern  Stated his I watch hole in his heart rate was 170 ultimately became 90 when he was finished playing tennis    Went to the ED no evidence of AFib or SVT     Also with history of  Hyperlipidemia, LDL in the 80s last visit had Zetia and request lipid profile but has not gotten that yet     Blood pressures been well controlled     No syncope or further palpitations     Beta-blocker/Toprol XL being overnighted    Patient Active Problem List   Diagnosis    S/P CABG x 4 ( Saphenous vein graft to diagonal 1, saphenous vein graft to OM3, radial to ramus intermedius, LIMA to LAD) 2012    Hypertension    Arteriosclerotic cardiovascular disease    Hyperlipidemia    Insomnia    [facility]openia) ([facility]    Current mild episode of major depressive disorder without prior episode ([place] [address] )    Memory dysfunction    Palpitations     Past Medical History:   Diagnosis Date    Acute angina ([facility])     High blood pressure     NOT HYPERTENSION    Irregular heartbeat     ST elevation (STEMI) myocardial infarction of unspecified site ([place] [address] )     Valvular heart disease      Social History     Socioeconomic History    Marital status: /Civil Union     Spouse name: Not on file    Number of children: 2    Years of education: Not on file    Highest education level: Not on file   Occupational History    Occupation: TRAVEL    Tobacco Use    Smoking status: Never Smoker    Smokeless tobacco: Never Used   Vaping Use    Vaping Use: Never used   Substance and Sexual Activity    Alcohol use: Yes     Comment: BEING A SOCIAL DRINKER: <= 1 DRINK/WEEK    Drug use: Never    Sexual activity: Yes   Other Topics Concern    Not on file   Social History Narrative    NO DAILY COFFEE CONSUMPTION (___CUPS/DAY)    NO DAILY COLA CONSUMPTION (___CANS/DAY)    DAILY TEA CONSUMPTION (___CUPS/DAY)     Social Determinants of Health     Financial Resource Strain:     Difficulty of Paying Living Expenses:    Food Insecurity:     Worried About Running Out of Food in the Last Year:     Ran Out of Food in the Last Year:    Transportation Needs:     Lack of Transportation (Medical):      Lack of Transportation (Non-Medical):    Physical Activity:     Days of Exercise per Week:     Minutes of Exercise per Session: Stress:     Feeling of Stress :    Social Connections:     Frequency of Communication with Friends and Family:     Frequency of Social Gatherings with Friends and Family:     Attends Restorationism Services:     Active Member of Clubs or Organizations:     Attends Club or Organization Meetings:     Marital Status:    Intimate Partner Violence:     Fear of Current or Ex-Partner:     Emotionally Abused:     Physically Abused:     Sexually Abused:       Family History   Problem Relation Age of Onset    Coronary artery disease Mother     Heart disease Mother     Hypertension Mother      Past Surgical History:   Procedure Laterality Date    CARDIAC SURGERY      OPEN HEART QUAD BYPASS; RESOLVED: 2012    CORONARY ANGIOPLASTY  02/21/2012    CORONARY ANGIOGRAPHY WITHOUT CONCOMITANT LEFT HEART CATHETERIZATION; 90% PROXIMAL LAD, 90% 1ST DIAG OSTIUM AND 95% PROXIMAL THIRD, 99% PROXIMAL RAMUS INTERMEDIUS    CORONARY ANGIOPLASTY WITH STENT PLACEMENT  02/21/2012    4 VESSEL DISEASE TO HAVE CABG; [facility]NAG[facility] BY: [facility]    CORONARY ARTERY BYPASS GRAFT      LAST ASSESSED: 11/17/17    CORONARY ARTERY BYPASS GRAFT  02/29/2012    CABG X4 (SVG TO 1ST DIAGNOL, SVG TO 3RD OBTUSE MARGINAL, FREE RADIAL ARTERY TO RAMUS INTERMEDIUS, SULLIVAN TO LAD) BY DR Severo Raman 2012     VASECTOMY  10/27/2015    SURGERY VAS DEFERENS; MANAGED BY: Theresa Oneil; LAST ASSESSED: 10/27/15       Current Outpatient Medications:     aspirin 81 mg chewable tablet, Chew 1 tablet daily, Disp: , Rfl:     ezetimibe (ZETIA) 10 mg tablet, Take 1 tablet (10 mg total) by mouth daily, Disp: 90 tablet, Rfl: 2    metoprolol succinate (TOPROL-XL) 25 mg 24 hr tablet, take 1 tablet by mouth daily, Disp: 90 tablet, Rfl: 3    Multiple Vitamins-Minerals (MULTIVITAMIN WITH MINERALS) tablet, Take 2 tablets by mouth daily, Disp: , Rfl:     rosuvastatin (CRESTOR) 20 MG tablet, Take 1 tablet (20 mg total) by mouth daily, Disp: 90 tablet, Rfl: 3   zolpidem (AMBIEN) 10 mg tablet, Take 1 tablet (10 mg total) by mouth daily at bedtime as needed for sleep, Disp: 30 tablet, Rfl: 2    metoprolol succinate (TOPROL-XL) 25 mg 24 hr tablet, Take 1 tablet (25 mg total) by mouth daily for 7 days, Disp: 7 tablet, Rfl: 0  No Known Allergies      Social, Family, Medication history reviewed and updated as necessary      Labs:     Lab Results   Component Value Date     09/19/2015    K 3 8 07/15/2021     07/15/2021    CO2 29 07/15/2021    BUN 18 07/15/2021    CREATININE 1 15 07/15/2021    CREATININE 0 98 08/07/2020    GLUCOSE 82 09/19/2015    CALCIUM 9 5 07/15/2021       Lab Results   Component Value Date    WBC 7 04 07/15/2021    HGB 15 4 07/15/2021    HGB 14 8 09/08/2020    HCT 45 3 07/15/2021    HCT 43 6 09/08/2020     (L) 07/15/2021     (L) 09/08/2020       Lab Results   Component Value Date    CHOL 129 01/21/2015     Lab Results   Component Value Date    HDL 42 08/07/2020    HDL 52 02/03/2020     Lab Results   Component Value Date    LDLCALC 87 08/07/2020    LDLCALC 100 02/03/2020     No results found for: LDLDIRECT          Lab Results   Component Value Date    TRIG 88 08/07/2020    TRIG 46 02/03/2020       Lab Results   Component Value Date    ALT 32 07/15/2021    ALT 31 08/07/2020    AST 21 07/15/2021    AST 20 08/07/2020       Lab Results   Component Value Date    INR 0 94 07/15/2021    INR 0 95 09/08/2020       Lab Results   Component Value Date    NTBNP 23 07/15/2021       Lab Results   Component Value Date    HGBA1C 5 3 02/03/2020           Imaging: Reviewed in epic        Review of Systems:  14 systems reviewed and negative with exception of the above        PHYSICAL EXAM:      Vitals:    07/21/21 1540   BP: 122/82   Pulse:    Resp:    Temp:    SpO2:      Body mass index is 31 74 kg/m²     Weight (last 2 days)     Date/Time   Weight    07/21/21 1538   112 (247 2)                Gen: No acute distress  HEENT: anicteric, mucous membranes moist  Neck: supple, no jugular venous distention, or carotid bruit  Heart: regular, normal s1 and s2, no murmur/rub or gallop  Lungs :clear to auscultation bilaterally, no rales/rhonchi or wheeze  Abdomen: soft nontender, normoactive bowel sounds, no organomegaly  Ext: warm and perfused, normal femoral pulses, no edema, or clubbing  Skin: warm, no rashes  Neuro: AAO x 3, no focal findings  Psychiatric: normal affect  Musculoskeletal: no obvious joint deformities

## 2021-07-29 ENCOUNTER — TELEPHONE (OUTPATIENT)
Dept: FAMILY MEDICINE CLINIC | Facility: CLINIC | Age: 51
End: 2021-07-29

## 2021-07-29 NOTE — TELEPHONE ENCOUNTER
Pt called  Has right shoulder pain for 2-3 days  Taking tylenol every 3-4 hours  States when wife rubs his shoulder it pops  Please advise    Thank you

## 2021-07-30 NOTE — TELEPHONE ENCOUNTER
Offered pt appt  He is going on vacation  States he will continue with tylenol and ice  Call if needed after he gets back

## 2021-08-10 DIAGNOSIS — F51.01 PRIMARY INSOMNIA: ICD-10-CM

## 2021-08-10 RX ORDER — ZOLPIDEM TARTRATE 10 MG/1
10 TABLET ORAL
Qty: 30 TABLET | Refills: 2 | Status: SHIPPED | OUTPATIENT
Start: 2021-08-10 | End: 2021-11-17 | Stop reason: SDUPTHER

## 2021-08-10 NOTE — TELEPHONE ENCOUNTER
Per PDMP last fill 07/06/21   Last OV 02/19/21, Next OV 09/08/21 07/06/2021 1 04/22/2021   ZOLPIDEM TARTRATE 10 MG TABLET  30 0 30 [facility] POG   [id number]  THRIF ([id number]) 2  [place]    05/24/2021 1 04/22/2021   ZOLPIDEM TARTRATE 10 MG TABLET  30 0 30 [facility] POG   [id number]  THRIF ([id number]) 1  [place]    04/22/2021 1 04/22/2021   ZOLPIDEM TARTRATE 10 MG TABLET  30 0 30 [facility] POG   [id number]  THRIF ([id number]) 0  [place]

## 2021-08-23 ENCOUNTER — OFFICE VISIT (OUTPATIENT)
Dept: FAMILY MEDICINE CLINIC | Facility: CLINIC | Age: 51
End: 2021-08-23
Payer: COMMERCIAL

## 2021-08-23 ENCOUNTER — HOSPITAL ENCOUNTER (OUTPATIENT)
Dept: RADIOLOGY | Facility: HOSPITAL | Age: 51
Discharge: HOME | End: 2021-08-23
Payer: COMMERCIAL

## 2021-08-23 VITALS
HEIGHT: 74 IN | HEART RATE: 60 BPM | DIASTOLIC BLOOD PRESSURE: 82 MMHG | WEIGHT: 246 LBS | OXYGEN SATURATION: 97 % | BODY MASS INDEX: 31.57 KG/M2 | TEMPERATURE: 97.4 F | SYSTOLIC BLOOD PRESSURE: 122 MMHG

## 2021-08-23 DIAGNOSIS — M25.511 ACUTE PAIN OF RIGHT SHOULDER: Primary | ICD-10-CM

## 2021-08-23 DIAGNOSIS — M25.511 ACUTE PAIN OF RIGHT SHOULDER: ICD-10-CM

## 2021-08-23 PROCEDURE — 73030 X-RAY EXAM OF SHOULDER: CPT

## 2021-08-23 PROCEDURE — 99214 OFFICE O/P EST MOD 30 MIN: CPT | Performed by: NURSE PRACTITIONER

## 2021-08-23 RX ORDER — METHYLPREDNISOLONE 4 MG/1
TABLET ORAL
Qty: 21 EACH | Refills: 0 | Status: SHIPPED | OUTPATIENT
Start: 2021-08-23 | End: 2021-09-08 | Stop reason: ALTCHOICE

## 2021-08-23 NOTE — PROGRESS NOTES
Assessment/Plan:     Diagnoses and all orders for this visit:    Acute pain of right shoulder  -     XR shoulder 2+ vw right; Future  -     Ambulatory referral to Physical Therapy; Future  -     methylPREDNISolone 4 MG tablet therapy pack; Use as directed on package        Discussed with patient plan to obtain shoulder x-ray to evaluate structure  Discussed with patient plan to refer to physical therapy and treat with a Medrol dose pack to reduce inflammation  Patient instructed to call if no improvement in 72 hours or symptoms worsen    Subjective:      Patient ID: Louis Jack is a 46 y o  male  46 y o male presenting with right shoulder pain for the past month  Patient was seen at [facility] on 07/31/2021 for right shoulder pain and treated with muscle relaxer  He reports that the muscle relaxer and NSAID did not do much to resolve symptoms  He reports that the pain is the worse at night time and when walking swinging his arms  He has full range of motion of the shoulder  He was playing tennis prior to this all starting so has decreased that activity  When his wife massages his shoulder it gets better but than he can hear popping sounds afterwards          Family History   Problem Relation Age of Onset    Coronary artery disease Mother     Heart disease Mother     Hypertension Mother      Social History     Socioeconomic History    Marital status: /Civil Union     Spouse name: Not on file    Number of children: 2    Years of education: Not on file    Highest education level: Not on file   Occupational History    Occupation: TRAVEL    Tobacco Use    Smoking status: Never Smoker    Smokeless tobacco: Never Used   Vaping Use    Vaping Use: Never used   Substance and Sexual Activity    Alcohol use: Yes     Comment: BEING A SOCIAL DRINKER: <= 1 DRINK/WEEK    Drug use: Never    Sexual activity: Yes   Other Topics Concern    Not on file   Social History Narrative    NO DAILY COFFEE CONSUMPTION (___CUPS/DAY)    NO DAILY COLA CONSUMPTION (___CANS/DAY)    DAILY TEA CONSUMPTION (___CUPS/DAY)     Social Determinants of Health     Financial Resource Strain:     Difficulty of Paying Living Expenses:    Food Insecurity:     Worried About Running Out of Food in the Last Year:     Ran Out of Food in the Last Year:    Transportation Needs:     Lack of Transportation (Medical):      Lack of Transportation (Non-Medical):    Physical Activity:     Days of Exercise per Week:     Minutes of Exercise per Session:    Stress:     Feeling of Stress :    Social Connections:     Frequency of Communication with Friends and Family:     Frequency of Social Gatherings with Friends and Family:     Attends Baptism Services:     Active Member of Clubs or Organizations:     Attends Club or Organization Meetings:     Marital Status:    Intimate Partner Violence:     Fear of Current or Ex-Partner:     Emotionally Abused:     Physically Abused:     Sexually Abused:      E-Cigarette/Vaping    E-Cigarette Use Never User      E-Cigarette/Vaping Substances    Nicotine No     THC No     CBD No     Flavoring No     Other No     Unknown No      Past Medical History:   Diagnosis Date    Acute angina ([facility] )     High blood pressure     NOT HYPERTENSION    Irregular heartbeat     ST elevation (STEMI) myocardial infarction of unspecified site ([facility] )     Valvular heart disease      Past Surgical History:   Procedure Laterality Date    CARDIAC SURGERY      OPEN HEART QUAD BYPASS; RESOLVED: 2012    CORONARY ANGIOPLASTY  02/21/2012    CORONARY ANGIOGRAPHY WITHOUT CONCOMITANT LEFT HEART CATHETERIZATION; 90% PROXIMAL LAD, 90% 1ST DIAG OSTIUM AND 95% PROXIMAL THIRD, 99% PROXIMAL RAMUS INTERMEDIUS    CORONARY ANGIOPLASTY WITH STENT PLACEMENT  02/21/2012    4 VESSEL DISEASE TO HAVE CABG; MANAGED BY: [facility]      LAST ASSESSED: 11/17/17    CORONARY ARTERY BYPASS GRAFT  02/29/2012    CABG X4 (SVG TO 1ST DIAGNOL, SVG TO 3RD OBTUSE MARGINAL, FREE RADIAL ARTERY TO RAMUS INTERMEDIUS, SULLIVAN TO LAD) BY DR Severo Raman 2012     VASECTOMY  10/27/2015    SURGERY VAS DEFERENS; MANAGED BY: Theresa Oneil; LAST ASSESSED: 10/27/15     No Known Allergies    Current Outpatient Medications:     aspirin 81 mg chewable tablet, Chew 1 tablet daily, Disp: , Rfl:     ezetimibe (ZETIA) 10 mg tablet, Take 1 tablet (10 mg total) by mouth daily, Disp: 90 tablet, Rfl: 2    metoprolol succinate (TOPROL-XL) 25 mg 24 hr tablet, take 1 tablet by mouth daily, Disp: 90 tablet, Rfl: 3    Multiple Vitamins-Minerals (MULTIVITAMIN WITH MINERALS) tablet, Take 2 tablets by mouth daily, Disp: , Rfl:     rosuvastatin (CRESTOR) 20 MG tablet, Take 1 tablet (20 mg total) by mouth daily, Disp: 90 tablet, Rfl: 3    zolpidem (AMBIEN) 10 mg tablet, Take 1 tablet (10 mg total) by mouth daily at bedtime as needed for sleep, Disp: 30 tablet, Rfl: 2    methylPREDNISolone 4 MG tablet therapy pack, Use as directed on package, Disp: 21 each, Rfl: 0    Review of Systems   Constitutional: Negative  Respiratory: Negative  Cardiovascular: Negative  Musculoskeletal: Positive for arthralgias and myalgias  Skin: Negative  Neurological: Negative  Negative for weakness and numbness  Psychiatric/Behavioral: Negative  Objective:    /82   Pulse 60   Temp (!) 97 4 °F (36 3 °C)   Ht 6' 2" (1 88 m)   Wt 112 kg (246 lb)   SpO2 97%   BMI 31 58 kg/m² (Reviewed)       Physical Exam  Vitals reviewed  Constitutional:       General: He is not in acute distress  Appearance: He is well-developed and well-groomed  He is not ill-appearing  HENT:      Head: Normocephalic and atraumatic        Right Ear: External ear normal       Left Ear: External ear normal       Nose:      Comments: Patient had a facial covering in place as per office protocol  Eyes:      General: Lids are normal  Extraocular Movements: Extraocular movements intact  Conjunctiva/sclera: Conjunctivae normal       Pupils: Pupils are equal, round, and reactive to light  Neck:      Trachea: Trachea and phonation normal    Cardiovascular:      Rate and Rhythm: Normal rate and regular rhythm  Pulses:           Radial pulses are 2+ on the right side and 2+ on the left side  Heart sounds: Normal heart sounds  Pulmonary:      Effort: Pulmonary effort is normal       Breath sounds: Normal breath sounds  Musculoskeletal:         General: Tenderness present  Right shoulder: Tenderness and bony tenderness present  No swelling or deformity  Normal range of motion  Normal strength  Normal pulse  Cervical back: Neck supple  Right lower leg: No edema  Left lower leg: No edema  Skin:     General: Skin is warm and dry  Capillary Refill: Capillary refill takes less than 2 seconds  Neurological:      General: No focal deficit present  Mental Status: He is alert and oriented to person, place, and time  Cranial Nerves: Cranial nerves are intact  Sensory: Sensation is intact  Motor: No weakness  Deep Tendon Reflexes:      Reflex Scores:       Tricep reflexes are 2+ on the right side and 2+ on the left side  Bicep reflexes are 2+ on the right side and 2+ on the left side  Brachioradialis reflexes are 2+ on the right side and 2+ on the left side  Psychiatric:         Mood and Affect: Mood normal          Behavior: Behavior normal  Behavior is cooperative

## 2021-09-08 ENCOUNTER — OFFICE VISIT (OUTPATIENT)
Dept: FAMILY MEDICINE CLINIC | Facility: CLINIC | Age: 51
End: 2021-09-08
Payer: COMMERCIAL

## 2021-09-08 VITALS
HEIGHT: 74 IN | WEIGHT: 251 LBS | HEART RATE: 76 BPM | SYSTOLIC BLOOD PRESSURE: 120 MMHG | BODY MASS INDEX: 32.21 KG/M2 | TEMPERATURE: 98.5 F | DIASTOLIC BLOOD PRESSURE: 80 MMHG

## 2021-09-08 DIAGNOSIS — D69.3 CHRONIC ITP (IDIOPATHIC THROMBOCYTOPENIA) (HCC): ICD-10-CM

## 2021-09-08 DIAGNOSIS — I10 ESSENTIAL HYPERTENSION: ICD-10-CM

## 2021-09-08 DIAGNOSIS — I25.10 ARTERIOSCLEROTIC CARDIOVASCULAR DISEASE: ICD-10-CM

## 2021-09-08 DIAGNOSIS — M25.511 CHRONIC RIGHT SHOULDER PAIN: ICD-10-CM

## 2021-09-08 DIAGNOSIS — Z00.00 ANNUAL PHYSICAL EXAM: Primary | ICD-10-CM

## 2021-09-08 DIAGNOSIS — G89.29 CHRONIC RIGHT SHOULDER PAIN: ICD-10-CM

## 2021-09-08 PROCEDURE — 3079F DIAST BP 80-89 MM HG: CPT | Performed by: FAMILY MEDICINE

## 2021-09-08 PROCEDURE — 99396 PREV VISIT EST AGE 40-64: CPT | Performed by: FAMILY MEDICINE

## 2021-09-08 PROCEDURE — 3725F SCREEN DEPRESSION PERFORMED: CPT | Performed by: FAMILY MEDICINE

## 2021-09-08 PROCEDURE — 3074F SYST BP LT 130 MM HG: CPT | Performed by: FAMILY MEDICINE

## 2021-09-08 NOTE — PATIENT INSTRUCTIONS

## 2021-09-08 NOTE — PROGRESS NOTES
ADULT ANNUAL [address] [place]    NAME: Ruddy Ramos  AGE: 46 y o  SEX: male  : 1970     DATE: 2021     Assessment and Plan:     Problem List Items Addressed This Visit        Cardiovascular and Mediastinum    Arteriosclerotic cardiovascular disease     Stable  Tachycardia appers to be rebound form stopping metoprolol  Cardio aware  Pt to continue present meds  Check labs  Recheck 6m         Relevant Orders    Comprehensive metabolic panel    Lipid panel    Hypertension     Well controlled  Cont present treatment  Monitor labs  Recheck 6m           Relevant Orders    Comprehensive metabolic panel    Lipid panel       Musculoskeletal and Integument    Chronic ITP (idiopathic thrombocytopenia) (HCC)     Plt count stable  Continue to monitor  Recheck 6m            Other    Right shoulder pain     Mild RC tendinitis as well as rhomboid strain  Continue PT  Heat before exercise, ice after  Recheck 2-3w if not resolving - earlier if worse           Other Visit Diagnoses     Annual physical exam    -  Primary          Immunizations and preventive care screenings were discussed with patient today  Appropriate education was printed on patient's after visit summary  Counseling:  · Exercise: the importance of regular exercise/physical activity was discussed  Recommend exercise 3-5 times per week for at least 30 minutes  · BMI Counseling: Body mass index is 32 23 kg/m²  The BMI is above normal  Nutrition recommendations include reducing portion sizes, moderation in carbohydrate intake, increasing intake of lean protein, reducing intake of saturated fat and trans fat and reducing intake of cholesterol  Return in about 6 months (around 3/8/2022)       Chief Complaint:     Chief Complaint   Patient presents with    Physical Exam    Follow-up      History of Present Illness:     Adult Annual Physical   Patient here for a comprehensive physical exam  The patient reports problems - as below  - pt was seen in the ED for tachycardia after miising 4 days of metoprolol (script lost in the mail)  Pt is doing well now  Up to date with Cardio  - pt has been having some R shoulder pain  Has been playing a lot of tennis  Was seen at [facility] 5-6w ago and given pain meds and muscle relaxer  Pt did not improve  Pt seen here 2 weeks ago and started on pred which helped but did not resolve the issue  Pt is in PT but is not improving    Diet and Physical Activity  · Diet/Nutrition: low fat diet and cheating frequently  · Exercise: moderate cardiovascular exercise  Depression Screening  PHQ-9 Depression Screening    PHQ-9:   Frequency of the following problems over the past two weeks:      Little interest or pleasure in doing things: 0 - not at all  Feeling down, depressed, or hopeless: 0 - not at all  Trouble falling or staying asleep, or sleeping too much: 0 - not at all  Feeling tired or having little energy: 0 - not at all  Poor appetite or overeatin - not at all  Feeling bad about yourself - or that you are a failure or have let yourself or your family down: 0 - not at all  Trouble concentrating on things, such as reading the newspaper or watching television: 0 - not at all  Moving or speaking so slowly that other people could have noticed  Or the opposite - being so fidgety or restless that you have been moving around a lot more than usual: 0 - not at all  Thoughts that you would be better off dead, or of hurting yourself in some way: 0 - not at all  PHQ-2 Score: 0  PHQ-9 Score: 0       General Health  · Sleep: 6-7h/night with 1/2-1 tab of ambien  · Hearing: normal - bilateral   · Vision: most recent eye exam <1 year ago and wears glasses  · Dental: regular dental visits   Health  · Symptoms include: none     Review of Systems:     Review of Systems   Constitutional: Negative  HENT: Negative  Eyes: Negative      Respiratory: Negative  Cardiovascular: Negative  Negative for palpitations (resolved)  Gastrointestinal: Negative  Endocrine: Negative  Genitourinary: Negative  Musculoskeletal: Positive for arthralgias, back pain and myalgias  Negative for gait problem, joint swelling, neck pain and neck stiffness  Skin: Negative  Allergic/Immunologic: Negative  Neurological: Negative  Hematological: Negative  Psychiatric/Behavioral: Negative         Past Medical History:     Past Medical History:   Diagnosis Date    Acute angina ([facility] [address] )     High blood pressure     NOT HYPERTENSION    Irregular heartbeat     ST elevation (STEMI) myocardial infarction of unspecified site ([facility] [address] )     Valvular heart disease       Past Surgical History:     Past Surgical History:   Procedure Laterality Date    CARDIAC SURGERY      OPEN HEART QUAD BYPASS; RESOLVED: 2012    CORONARY ANGIOPLASTY  02/21/2012    CORONARY ANGIOGRAPHY WITHOUT CONCOMITANT LEFT HEART CATHETERIZATION; 90% PROXIMAL LAD, 90% 1ST DIAG OSTIUM AND 95% PROXIMAL THIRD, 99% PROXIMAL RAMUS INTERMEDIUS    CORONARY ANGIOPLASTY WITH STENT PLACEMENT  02/21/2012    4 VESSEL DISEASE TO HAVE CABG; MANAGED BY: SHABBIR SNYDER    CORONARY ARTERY BYPASS GRAFT      LAST ASSESSED: 11/17/17    CORONARY ARTERY BYPASS GRAFT  02/29/2012    CABG X4 (SVG TO 1ST DIAGNOL, SVG TO 3RD OBTUSE MARGINAL, FREE RADIAL ARTERY TO RAMUS INTERMEDIUS, SULLIVAN TO LAD) BY DR Lambert Dubin 2012     VASECTOMY  10/27/2015    SURGERY VAS DEFERENS; MANAGED BY: Norah Jarquin; LAST ASSESSED: 10/27/15      Family History:     Family History   Problem Relation Age of Onset    Coronary artery disease Mother     Heart disease Mother     Hypertension Mother       Social History:     Social History     Socioeconomic History    Marital status: /Civil Union     Spouse name: None    Number of children: 2    Years of education: None    Highest education level: None   Occupational History    Occupation: TRAVEL    Tobacco Use    Smoking status: Never Smoker    Smokeless tobacco: Never Used   Vaping Use    Vaping Use: Never used   Substance and Sexual Activity    Alcohol use: Yes     Comment: BEING A SOCIAL DRINKER: <= 1 DRINK/WEEK    Drug use: Never    Sexual activity: Yes   Other Topics Concern    None   Social History Narrative    NO DAILY COFFEE CONSUMPTION (___CUPS/DAY)    NO DAILY COLA CONSUMPTION (___CANS/DAY)    DAILY TEA CONSUMPTION (___CUPS/DAY)     Social Determinants of Health     Financial Resource Strain:     Difficulty of Paying Living Expenses:    Food Insecurity:     Worried About Running Out of Food in the Last Year:     Ran Out of Food in the Last Year:    Transportation Needs:     Lack of Transportation (Medical):      Lack of Transportation (Non-Medical):    Physical Activity:     Days of Exercise per Week:     Minutes of Exercise per Session:    Stress:     Feeling of Stress :    Social Connections:     Frequency of Communication with Friends and Family:     Frequency of Social Gatherings with Friends and Family:     Attends Orthodoxy Services:     Active Member of Clubs or Organizations:     Attends Club or Organization Meetings:     Marital Status:    Intimate Partner Violence:     Fear of Current or Ex-Partner:     Emotionally Abused:     Physically Abused:     Sexually Abused:       Current Medications:     Current Outpatient Medications   Medication Sig Dispense Refill    aspirin 81 mg chewable tablet Chew 1 tablet daily      ezetimibe (ZETIA) 10 mg tablet Take 1 tablet (10 mg total) by mouth daily 90 tablet 2    metoprolol succinate (TOPROL-XL) 25 mg 24 hr tablet take 1 tablet by mouth daily 90 tablet 3    Multiple Vitamins-Minerals (MULTIVITAMIN WITH MINERALS) tablet Take 2 tablets by mouth daily      rosuvastatin (CRESTOR) 20 MG tablet Take 1 tablet (20 mg total) by mouth daily 90 tablet 3    zolpidem (AMBIEN) 10 mg tablet Take 1 tablet (10 mg total) by mouth daily at bedtime as needed for sleep 30 tablet 2     No current facility-administered medications for this visit  Allergies:     No Known Allergies   Physical Exam:     /80   Pulse 76   Temp 98 5 °F (36 9 °C)   Ht 6' 2" (1 88 m)   Wt 114 kg (251 lb)   BMI 32 23 kg/m²     Physical Exam  Vitals reviewed  Constitutional:       Appearance: He is well-developed  HENT:      Head: Normocephalic and atraumatic  Right Ear: Tympanic membrane, ear canal and external ear normal       Left Ear: Ear canal and external ear normal    Eyes:      Extraocular Movements: Extraocular movements intact  Conjunctiva/sclera: Conjunctivae normal       Pupils: Pupils are equal, round, and reactive to light  Neck:      Thyroid: No thyromegaly  Vascular: No JVD  Cardiovascular:      Rate and Rhythm: Normal rate and regular rhythm  Pulses: Normal pulses  Heart sounds: Normal heart sounds  No murmur heard  Pulmonary:      Effort: Pulmonary effort is normal  No respiratory distress  Breath sounds: Normal breath sounds  No wheezing or rales  Abdominal:      General: Bowel sounds are normal  There is no distension  Palpations: Abdomen is soft  There is no mass  Tenderness: There is no abdominal tenderness  Musculoskeletal:         General: Tenderness (Mild over the R rhomboid  no spasm  Sl discomfort with abduction and anterior flexion of the R shoulder  Bicep tendon NT) present  No swelling or deformity  Normal range of motion  Cervical back: Normal range of motion and neck supple  No muscular tenderness  Right lower leg: No edema  Left lower leg: No edema  Lymphadenopathy:      Cervical: No cervical adenopathy  Skin:     General: Skin is warm  Capillary Refill: Capillary refill takes less than 2 seconds  Neurological:      Mental Status: He is alert and oriented to person, place, and time  Cranial Nerves:  No cranial nerve deficit  Sensory: No sensory deficit  Motor: No weakness or abnormal muscle tone  Coordination: Coordination normal       Gait: Gait normal       Deep Tendon Reflexes: Reflexes normal    Psychiatric:         Mood and Affect: Mood normal          Behavior: Behavior normal          Thought Content: Thought content normal          Judgment: Judgment normal       Comments: PHQ-9 Depression Screening    PHQ-9:   Frequency of the following problems over the past two weeks:      Little interest or pleasure in doing things: 0 - not at all  Feeling down, depressed, or hopeless: 0 - not at all  Trouble falling or staying asleep, or sleeping too much: 0 - not at all  Feeling tired or having little energy: 0 - not at all  Poor appetite or overeatin - not at all  Feeling bad about yourself - or that you are a failure or have let   yourself or your family down: 0 - not at all  Trouble concentrating on things, such as reading the newspaper or watching   television: 0 - not at all  Moving or speaking so slowly that other people could have noticed   Or the   opposite - being so fidgety or restless that you have been moving around a   lot more than usual: 0 - not at all  Thoughts that you would be better off dead, or of hurting yourself in some   way: 0 - not at all  PHQ-2 Score: 0  PHQ-9 Score: 0               MD Adonay Gonzalez

## 2021-09-09 NOTE — ASSESSMENT & PLAN NOTE
Stable  Tachycardia appers to be rebound form stopping metoprolol  Cardio aware  Pt to continue present meds  Check labs   Recheck 6m

## 2021-09-09 NOTE — ASSESSMENT & PLAN NOTE
Mild RC tendinitis as well as rhomboid strain  Continue PT  Heat before exercise, ice after   Recheck 2-3w if not resolving - earlier if worse

## 2021-09-17 ENCOUNTER — TELEPHONE (OUTPATIENT)
Dept: HEMATOLOGY ONCOLOGY | Facility: CLINIC | Age: 51
End: 2021-09-17

## 2021-09-17 DIAGNOSIS — R23.8 EASY BRUISING: Primary | ICD-10-CM

## 2021-09-17 DIAGNOSIS — T14.8XXA BRUISING: ICD-10-CM

## 2021-09-17 DIAGNOSIS — D69.3 CHRONIC ITP (IDIOPATHIC THROMBOCYTOPENIA) (HCC): ICD-10-CM

## 2021-09-18 ENCOUNTER — LAB (OUTPATIENT)
Dept: LAB | Facility: CLINIC | Age: 51
End: 2021-09-18
Payer: COMMERCIAL

## 2021-09-18 DIAGNOSIS — F32.0 CURRENT MILD EPISODE OF MAJOR DEPRESSIVE DISORDER WITHOUT PRIOR EPISODE (HCC): ICD-10-CM

## 2021-09-18 DIAGNOSIS — I25.10 CORONARY ARTERY DISEASE INVOLVING NATIVE CORONARY ARTERY OF NATIVE HEART WITHOUT ANGINA PECTORIS: ICD-10-CM

## 2021-09-18 DIAGNOSIS — T14.8XXA BRUISING: ICD-10-CM

## 2021-09-18 DIAGNOSIS — D69.3 CHRONIC ITP (IDIOPATHIC THROMBOCYTOPENIA) (HCC): ICD-10-CM

## 2021-09-18 DIAGNOSIS — I25.10 ARTERIOSCLEROTIC CARDIOVASCULAR DISEASE: ICD-10-CM

## 2021-09-18 DIAGNOSIS — R23.8 EASY BRUISING: ICD-10-CM

## 2021-09-18 DIAGNOSIS — I10 ESSENTIAL HYPERTENSION: ICD-10-CM

## 2021-09-18 LAB
ALBUMIN SERPL BCP-MCNC: 4.1 G/DL (ref 3.5–5)
ALP SERPL-CCNC: 56 U/L (ref 46–116)
ALT SERPL W P-5'-P-CCNC: 32 U/L (ref 12–78)
ANION GAP SERPL CALCULATED.3IONS-SCNC: 8 MMOL/L (ref 4–13)
AST SERPL W P-5'-P-CCNC: 22 U/L (ref 5–45)
BASOPHILS # BLD AUTO: 0.01 THOUSANDS/ΜL (ref 0–0.1)
BASOPHILS NFR BLD AUTO: 0 % (ref 0–1)
BILIRUB SERPL-MCNC: 0.49 MG/DL (ref 0.2–1)
BUN SERPL-MCNC: 10 MG/DL (ref 5–25)
CALCIUM SERPL-MCNC: 8.9 MG/DL (ref 8.3–10.1)
CHLORIDE SERPL-SCNC: 107 MMOL/L (ref 100–108)
CHOLEST SERPL-MCNC: 131 MG/DL (ref 50–200)
CO2 SERPL-SCNC: 25 MMOL/L (ref 21–32)
CREAT SERPL-MCNC: 0.95 MG/DL (ref 0.6–1.3)
EOSINOPHIL # BLD AUTO: 0.07 THOUSAND/ΜL (ref 0–0.61)
EOSINOPHIL NFR BLD AUTO: 2 % (ref 0–6)
ERYTHROCYTE [DISTWIDTH] IN BLOOD BY AUTOMATED COUNT: 12 % (ref 11.6–15.1)
GFR SERPL CREATININE-BSD FRML MDRD: 92 ML/MIN/1.73SQ M
GLUCOSE P FAST SERPL-MCNC: 107 MG/DL (ref 65–99)
HCT VFR BLD AUTO: 42.1 % (ref 36.5–49.3)
HDLC SERPL-MCNC: 46 MG/DL
HGB BLD-MCNC: 14.5 G/DL (ref 12–17)
IMM GRANULOCYTES # BLD AUTO: 0.01 THOUSAND/UL (ref 0–0.2)
IMM GRANULOCYTES NFR BLD AUTO: 0 % (ref 0–2)
LDLC SERPL CALC-MCNC: 73 MG/DL (ref 0–100)
LYMPHOCYTES # BLD AUTO: 1.49 THOUSANDS/ΜL (ref 0.6–4.47)
LYMPHOCYTES NFR BLD AUTO: 33 % (ref 14–44)
MCH RBC QN AUTO: 31.5 PG (ref 26.8–34.3)
MCHC RBC AUTO-ENTMCNC: 34.4 G/DL (ref 31.4–37.4)
MCV RBC AUTO: 92 FL (ref 82–98)
MONOCYTES # BLD AUTO: 0.4 THOUSAND/ΜL (ref 0.17–1.22)
MONOCYTES NFR BLD AUTO: 9 % (ref 4–12)
NEUTROPHILS # BLD AUTO: 2.52 THOUSANDS/ΜL (ref 1.85–7.62)
NEUTS SEG NFR BLD AUTO: 56 % (ref 43–75)
NONHDLC SERPL-MCNC: 85 MG/DL
NRBC BLD AUTO-RTO: 0 /100 WBCS
PLATELET # BLD AUTO: 128 THOUSANDS/UL (ref 149–390)
PMV BLD AUTO: 11.4 FL (ref 8.9–12.7)
POTASSIUM SERPL-SCNC: 4.5 MMOL/L (ref 3.5–5.3)
PROT SERPL-MCNC: 7.3 G/DL (ref 6.4–8.2)
RBC # BLD AUTO: 4.6 MILLION/UL (ref 3.88–5.62)
SODIUM SERPL-SCNC: 140 MMOL/L (ref 136–145)
TRIGL SERPL-MCNC: 58 MG/DL
TSH SERPL DL<=0.05 MIU/L-ACNC: 1.64 UIU/ML (ref 0.36–3.74)
WBC # BLD AUTO: 4.5 THOUSAND/UL (ref 4.31–10.16)

## 2021-09-18 PROCEDURE — 85025 COMPLETE CBC W/AUTO DIFF WBC: CPT

## 2021-09-18 PROCEDURE — 80061 LIPID PANEL: CPT

## 2021-09-18 PROCEDURE — 36415 COLL VENOUS BLD VENIPUNCTURE: CPT

## 2021-09-18 PROCEDURE — 84443 ASSAY THYROID STIM HORMONE: CPT

## 2021-09-18 PROCEDURE — 80053 COMPREHEN METABOLIC PANEL: CPT

## 2021-09-20 ENCOUNTER — OFFICE VISIT (OUTPATIENT)
Dept: HEMATOLOGY ONCOLOGY | Facility: CLINIC | Age: 51
End: 2021-09-20
Payer: COMMERCIAL

## 2021-09-20 VITALS
TEMPERATURE: 97.8 F | BODY MASS INDEX: 31.57 KG/M2 | WEIGHT: 246 LBS | HEIGHT: 74 IN | SYSTOLIC BLOOD PRESSURE: 100 MMHG | HEART RATE: 58 BPM | RESPIRATION RATE: 16 BRPM | OXYGEN SATURATION: 97 % | DIASTOLIC BLOOD PRESSURE: 68 MMHG

## 2021-09-20 DIAGNOSIS — D69.6 THROMBOCYTOPENIA (HCC): Primary | ICD-10-CM

## 2021-09-20 PROCEDURE — 3008F BODY MASS INDEX DOCD: CPT | Performed by: INTERNAL MEDICINE

## 2021-09-20 PROCEDURE — 1036F TOBACCO NON-USER: CPT | Performed by: INTERNAL MEDICINE

## 2021-09-20 PROCEDURE — 99213 OFFICE O/P EST LOW 20 MIN: CPT | Performed by: INTERNAL MEDICINE

## 2021-09-20 NOTE — PROGRESS NOTES
Hematology Outpatient Follow - Up Note  Gopal Reyes 46 y o  male MRN: @ Encounter: [id number]        Date:  9/20/2021        Assessment/ Plan: On immune thrombocytopenic purpura with stable platelet count in the range 100,000-130,000    No evidence of leukopenia, anemia    He is on aspirin 81 mg p o  daily for coronary artery disease and quadruple bypass surgery     I suggest at this time CBC every 6-12 months     I will be glad to see the patient platelet count below 100,000        Labs and imaging studies are reviewed by ordering provider once results are available  If there are findings that need immediate attention, you will be contacted when results available  Discussing results and the implication on your healthcare is best discussed in person at your follow-up visit         HPI:    Yeimy Nixonight a 46 y o  seen for initial consultation 9/4/2020 at the referral of Dr Manan Deleon easy bruising        Approximately every 2 months he continues in a deep dark bruise over his left flank area   This can take a month to resolve   This has been occurring for approximately 7-8 years      He does have a history of easy bleeding   As a teenager, he was prone to nose bleeds   He reported that has a history of gingival bleeding a needed to be evaluated by periodontist      He does have a history of coronary artery disease  [facility] is s/p CABG x4 in 2012   He states he was told he bled excessively during the procedure      He does not know his father's side of the family   To his knowledge, does not have any bleeding disorders on his mother's side of the family      He does not take NSAIDs with any regularity   He is on aspirin 81 milligrams since his CABG, he takes ibuprofen almost 3 to 4 times a week after he plays tennis   He drinks approximately 2 beers per week,  no liquor  [facility] is a never smoker     He was noticed to have chronic mild thrombocytopenia since 2015 in the range between 336736 to 324099     Workup was negative for von Willebrand's disease, normal PT, normal PTT  Interval History:        Previous Treatment:         Test Results:    Imaging: XR shoulder 2+ vw right    Result Date: 8/28/2021  Narrative: RIGHT SHOULDER INDICATION:   M25 511: Pain in right shoulder  COMPARISON:  None VIEWS:  XR SHOULDER 2+ VW RIGHT FINDINGS: There is no acute fracture or dislocation  No significant degenerative changes  No lytic or blastic osseous lesion  Soft tissues are unremarkable  Impression: No acute osseous abnormality  Workstation performed: [id number]       Labs:   Lab Results   Component Value Date    WBC 4 50 09/18/2021    HGB 14 5 09/18/2021    HCT 42 1 09/18/2021    MCV 92 09/18/2021     (L) 09/18/2021     Lab Results   Component Value Date     09/19/2015    K 4 5 09/18/2021     09/18/2021    CO2 25 09/18/2021    ANIONGAP 9 09/19/2015    BUN 10 09/18/2021    CREATININE 0 95 09/18/2021    GLUCOSE 82 09/19/2015    GLUF 107 (H) 09/18/2021    CALCIUM 8 9 09/18/2021    AST 22 09/18/2021    ALT 32 09/18/2021    ALKPHOS 56 09/18/2021    PROT 7 5 09/19/2015    BILITOT 0 27 09/19/2015    EGFR 92 09/18/2021       No results found for: IRON, TIBC, FERRITIN    No results found for: [id number]      ROS: Review of Systems   Constitutional: Negative  Negative for appetite change, chills, diaphoresis, fatigue, fever and unexpected weight change  HENT:   Negative for hearing loss, lump/mass, mouth sores, nosebleeds, sore throat, trouble swallowing and voice change  Eyes: Negative  Negative for eye problems and icterus  Respiratory: Negative  Negative for chest tightness, cough, hemoptysis and shortness of breath  Cardiovascular: Negative for chest pain and leg swelling  Gastrointestinal: Negative for abdominal distention, abdominal pain, blood in stool, constipation, diarrhea and nausea  Endocrine: Negative      Genitourinary: Negative for dysuria, frequency, hematuria and pelvic pain  Musculoskeletal: Negative  Negative for arthralgias, back pain, flank pain, gait problem, myalgias and neck stiffness  Skin: Negative for itching and rash  Neurological: Negative for dizziness, gait problem, headaches, light-headedness, numbness and speech difficulty  Hematological: Negative for adenopathy  Does not bruise/bleed easily  Psychiatric/Behavioral: Negative for confusion, decreased concentration, depression and sleep disturbance  The patient is not nervous/anxious  Current Medications: Reviewed  Allergies: Reviewed  PMH/FH/SH:  Reviewed      Physical Exam:    Body surface area is 2 38 meters squared  Wt Readings from Last 3 Encounters:   09/20/21 112 kg (246 lb)   09/08/21 114 kg (251 lb)   08/23/21 112 kg (246 lb)        Temp Readings from Last 3 Encounters:   09/20/21 97 8 °F (36 6 °C)   09/08/21 98 5 °F (36 9 °C)   08/23/21 (!) 97 4 °F (36 3 °C)        BP Readings from Last 3 Encounters:   09/20/21 100/68   09/08/21 120/80   08/23/21 122/82         Pulse Readings from Last 3 Encounters:   09/20/21 58   09/08/21 76   08/23/21 60        Physical Exam  Vitals reviewed  Constitutional:       General: He is not in acute distress  Appearance: He is well-developed  He is not diaphoretic  HENT:      Head: Normocephalic and atraumatic  Eyes:      Conjunctiva/sclera: Conjunctivae normal    Neck:      Trachea: No tracheal deviation  Cardiovascular:      Rate and Rhythm: Normal rate and regular rhythm  Heart sounds: No murmur heard  No friction rub  No gallop  Pulmonary:      Effort: Pulmonary effort is normal  No respiratory distress  Breath sounds: Normal breath sounds  No wheezing or rales  Chest:      Chest wall: No tenderness  Abdominal:      General: There is no distension  Palpations: Abdomen is soft  Tenderness: There is no abdominal tenderness  Musculoskeletal:      Cervical back: Normal range of motion and neck supple  Right lower leg: No edema  Left lower leg: No edema  Lymphadenopathy:      Cervical: No cervical adenopathy  Skin:     General: Skin is warm and dry  Coloration: Skin is not pale  Findings: No erythema  Neurological:      Mental Status: He is alert and oriented to person, place, and time  Psychiatric:         Behavior: Behavior normal          Thought Content: Thought content normal          Judgment: Judgment normal          ECO    Goals and Barriers:  Current Goal: Minimize effects of disease  Barriers: None  Patient's Capacity to Self Care:  Patient is able to self care      Code Status: @[facility]@

## 2021-11-13 DIAGNOSIS — I25.10 CORONARY ARTERY DISEASE INVOLVING NATIVE CORONARY ARTERY OF NATIVE HEART WITHOUT ANGINA PECTORIS: ICD-10-CM

## 2021-11-13 RX ORDER — EZETIMIBE 10 MG/1
TABLET ORAL
Qty: 90 TABLET | Refills: 2 | Status: SHIPPED | OUTPATIENT
Start: 2021-11-13

## 2021-11-17 DIAGNOSIS — F51.01 PRIMARY INSOMNIA: ICD-10-CM

## 2021-11-18 RX ORDER — ZOLPIDEM TARTRATE 10 MG/1
10 TABLET ORAL
Qty: 30 TABLET | Refills: 2 | Status: SHIPPED | OUTPATIENT
Start: 2021-11-18 | End: 2022-02-18 | Stop reason: SDUPTHER

## 2021-12-20 ENCOUNTER — NURSE TRIAGE (OUTPATIENT)
Dept: OTHER | Facility: OTHER | Age: 51
End: 2021-12-20

## 2021-12-20 DIAGNOSIS — Z20.828 SARS-ASSOCIATED CORONAVIRUS EXPOSURE: Primary | ICD-10-CM

## 2021-12-21 ENCOUNTER — TELEMEDICINE (OUTPATIENT)
Dept: FAMILY MEDICINE CLINIC | Facility: CLINIC | Age: 51
End: 2021-12-21
Payer: COMMERCIAL

## 2021-12-21 DIAGNOSIS — B34.9 VIRAL ILLNESS: Primary | ICD-10-CM

## 2021-12-21 PROCEDURE — 99213 OFFICE O/P EST LOW 20 MIN: CPT | Performed by: FAMILY MEDICINE

## 2021-12-22 PROBLEM — M25.511 RIGHT SHOULDER PAIN: Status: RESOLVED | Noted: 2021-09-08 | Resolved: 2021-12-22

## 2021-12-23 ENCOUNTER — TELEPHONE (OUTPATIENT)
Dept: FAMILY MEDICINE CLINIC | Facility: CLINIC | Age: 51
End: 2021-12-23

## 2021-12-23 ENCOUNTER — OFFICE VISIT (OUTPATIENT)
Dept: FAMILY MEDICINE CLINIC | Facility: CLINIC | Age: 51
End: 2021-12-23
Payer: COMMERCIAL

## 2021-12-23 VITALS — RESPIRATION RATE: 12 BRPM | OXYGEN SATURATION: 98 % | HEART RATE: 78 BPM

## 2021-12-23 DIAGNOSIS — U07.1 COVID-19: Primary | ICD-10-CM

## 2021-12-23 PROCEDURE — 99213 OFFICE O/P EST LOW 20 MIN: CPT | Performed by: FAMILY MEDICINE

## 2022-01-12 DIAGNOSIS — I10 ESSENTIAL HYPERTENSION: ICD-10-CM

## 2022-01-12 DIAGNOSIS — E78.5 HYPERLIPIDEMIA, UNSPECIFIED HYPERLIPIDEMIA TYPE: ICD-10-CM

## 2022-01-12 RX ORDER — METOPROLOL SUCCINATE 25 MG/1
25 TABLET, EXTENDED RELEASE ORAL DAILY
Qty: 90 TABLET | Refills: 3 | Status: SHIPPED | OUTPATIENT
Start: 2022-01-12

## 2022-01-12 RX ORDER — ROSUVASTATIN CALCIUM 20 MG/1
20 TABLET, COATED ORAL DAILY
Qty: 90 TABLET | Refills: 3 | Status: SHIPPED | OUTPATIENT
Start: 2022-01-12 | End: 2022-02-23 | Stop reason: SDUPTHER

## 2022-01-24 ENCOUNTER — OFFICE VISIT (OUTPATIENT)
Dept: CARDIAC SURGERY | Facility: CLINIC | Age: 52
End: 2022-01-24
Payer: COMMERCIAL

## 2022-01-24 VITALS
HEART RATE: 58 BPM | SYSTOLIC BLOOD PRESSURE: 108 MMHG | WEIGHT: 257 LBS | BODY MASS INDEX: 32.98 KG/M2 | HEIGHT: 74 IN | DIASTOLIC BLOOD PRESSURE: 64 MMHG

## 2022-01-24 DIAGNOSIS — I25.10 ARTERIOSCLEROTIC CARDIOVASCULAR DISEASE: ICD-10-CM

## 2022-01-24 DIAGNOSIS — I10 HYPERTENSION, UNSPECIFIED TYPE: ICD-10-CM

## 2022-01-24 DIAGNOSIS — Z95.1 S/P CABG X 4: Primary | ICD-10-CM

## 2022-01-24 DIAGNOSIS — E78.2 MIXED HYPERLIPIDEMIA: ICD-10-CM

## 2022-01-24 DIAGNOSIS — E78.5 HYPERLIPIDEMIA, UNSPECIFIED HYPERLIPIDEMIA TYPE: ICD-10-CM

## 2022-01-24 PROCEDURE — 99214 OFFICE O/P EST MOD 30 MIN: CPT | Performed by: INTERNAL MEDICINE

## 2022-01-24 PROCEDURE — 3008F BODY MASS INDEX DOCD: CPT | Performed by: INTERNAL MEDICINE

## 2022-01-24 PROCEDURE — 93000 ELECTROCARDIOGRAM COMPLETE: CPT | Performed by: INTERNAL MEDICINE

## 2022-01-24 PROCEDURE — 3074F SYST BP LT 130 MM HG: CPT | Performed by: INTERNAL MEDICINE

## 2022-01-24 PROCEDURE — 3078F DIAST BP <80 MM HG: CPT | Performed by: INTERNAL MEDICINE

## 2022-01-24 PROCEDURE — 1036F TOBACCO NON-USER: CPT | Performed by: INTERNAL MEDICINE

## 2022-01-24 NOTE — PROGRESS NOTES
Cardiology Follow Up Visit    Awa Agustin  1970  [id number]  [id number] [facility] [address] [address] [phone]    1  S/P CABG x 4 ( Saphenous vein graft to diagonal 1, saphenous vein graft to OM3, radial to ramus intermedius, LIMA to LAD) 2012  POCT ECG    Lipid Panel with Direct LDL reflex   2  Arteriosclerotic cardiovascular disease  POCT ECG    Lipid Panel with Direct LDL reflex   3  Mixed hyperlipidemia  POCT ECG    Lipid Panel with Direct LDL reflex   4  Hypertension, unspecified type  POCT ECG   5  Hyperlipidemia, unspecified hyperlipidemia type           Discussion/Summary:    1  CAD  A  CABG x 4 2012, after presenting with new onset angina and abnormal stress, nm stress surveillance normal 2020, normal lvef     2  Hyperlipidemia on rosuva 20/zetia 10 LDL improved to low 70s from 87     3  Palpitations, resolved    Plan:  Patient overall  doing well  Increase Crestor to 40 mg with LDL just in the low 70s on combination risk to 20/Zetia 10    If no better in 2 months would suggest PCSK9 inhibitor therapy/Repatha given bypass surgery at a young age with current LDL    He remains active  Continues to do well  In place tightness throughout the week  Tries to eat healthy    Encouraged continued sensible/Mediterranean type diet daily activity and exercise  Interval History:    79-year-old male history of CABG x4 2012 after presenting with new onset exertional chest discomfort and abnormal stress test which prompted cardiac catheterization  He has been doing well since    Last visit in July heart rate elevated on apple watch while playing doubles tennis    He stands office beta-blocker at that time he has ran out had been on for a number of years    Nonetheless, no recurrence    Feels well, no complaints  Occasional easy bruising on aspirin 81 mg daily    I TP followed by Hematology          Patient Active Problem List   Diagnosis    S/P CABG x 4 ( Saphenous vein graft to diagonal 1, saphenous vein graft to OM3, radial to ramus intermedius, LIMA to LAD) 2012    Hypertension    Arteriosclerotic cardiovascular disease    Hyperlipidemia    Insomnia    [facility]opathic thromb[facility]openia) ([facility])    Current mild episode of major depressive disorder without prior episode ([place] [address] )    Memory dysfunction    Palpitations     Past Medical History:   Diagnosis Date    Acute angina ([facility])     High blood pressure     NOT HYPERTENSION    Irregular heartbeat     ST elevation (STEMI) myocardial infarction of unspecified site ([place] [address] )     Valvular heart disease      Social History     Socioeconomic History    Marital status: /Civil Union     Spouse name: Not on file    Number of children: 2    Years of education: Not on file    Highest education level: Not on file   Occupational History    Occupation: TRAVEL    Tobacco Use    Smoking status: Never Smoker    Smokeless tobacco: Never Used   Vaping Use    Vaping Use: Never used   Substance and Sexual Activity    Alcohol use: Yes     Comment: BEING A SOCIAL DRINKER: <= 1 DRINK/WEEK    Drug use: Never    Sexual activity: Yes   Other Topics Concern    Not on file   Social History Narrative    NO DAILY COFFEE CONSUMPTION (___CUPS/DAY)    NO DAILY COLA CONSUMPTION (___CANS/DAY)    DAILY TEA CONSUMPTION (___CUPS/DAY)     Social Determinants of Health     Financial Resource Strain: Not on file   Food Insecurity: Not on file   Transportation Needs: Not on file   Physical Activity: Not on file   Stress: Not on file   Social Connections: Not on file   Intimate Partner Violence: Not on file   Housing Stability: Not on file      Family History   Problem Relation Age of Onset    Coronary artery disease Mother     Heart disease Mother     Hypertension Mother      Past Surgical History:   Procedure Laterality Date    CARDIAC SURGERY      OPEN HEART QUAD BYPASS; RESOLVED: 2012    CORONARY ANGIOPLASTY  02/21/2012    CORONARY ANGIOGRAPHY WITHOUT CONCOMITANT LEFT HEART CATHETERIZATION; 90% PROXIMAL LAD, 90% 1ST DIAG OSTIUM AND 95% PROXIMAL THIRD, 99% PROXIMAL RAMUS INTERMEDIUS    CORONARY ANGIOPLASTY WITH STENT PLACEMENT  02/21/2012    4 VESSEL DISEASE TO HAVE CABG; MANAGED BY: SHABBIR SNYDER    CORONARY ARTERY BYPASS GRAFT      LAST ASSESSED: 11/17/17    CORONARY ARTERY BYPASS GRAFT  02/29/2012    CABG X4 (SVG TO 1ST DIAGNOL, SVG TO 3RD OBTUSE MARGINAL, FREE RADIAL ARTERY TO RAMUS INTERMEDIUS, SULLIVAN TO LAD) BY DR Jose Guadalupe Reynolds 2012     VASECTOMY  10/27/2015    SURGERY VAS DEFERENS; MANAGED BY: Jennie Davis; LAST ASSESSED: 10/27/15       Current Outpatient Medications:     aspirin 81 mg chewable tablet, Chew 1 tablet daily, Disp: , Rfl:     ezetimibe (ZETIA) 10 mg tablet, take 1 tablet by mouth once daily, Disp: 90 tablet, Rfl: 2    metoprolol succinate (TOPROL-XL) 25 mg 24 hr tablet, Take 1 tablet (25 mg total) by mouth daily, Disp: 90 tablet, Rfl: 3    Multiple Vitamins-Minerals (MULTIVITAMIN WITH MINERALS) tablet, Take 2 tablets by mouth daily, Disp: , Rfl:     rosuvastatin (CRESTOR) 20 MG tablet, Take 1 tablet (20 mg total) by mouth daily, Disp: 90 tablet, Rfl: 3    zolpidem (AMBIEN) 10 mg tablet, Take 1 tablet (10 mg total) by mouth daily at bedtime as needed for sleep, Disp: 30 tablet, Rfl: 2  No Known Allergies      Social, Family, Medication history reviewed and updated as necessary      Labs:     Lab Results   Component Value Date     09/19/2015    K 4 5 09/18/2021     09/18/2021    CO2 25 09/18/2021    BUN 10 09/18/2021    CREATININE 0 95 09/18/2021    CREATININE 1 15 07/15/2021    GLUCOSE 82 09/19/2015    CALCIUM 8 9 09/18/2021       Lab Results   Component Value Date    WBC 4 50 09/18/2021    HGB 14 5 09/18/2021    HGB 15 4 07/15/2021    HCT 42 1 09/18/2021    HCT 45 3 07/15/2021    PLT 128 (L) 09/18/2021     (L) 07/15/2021       Lab Results   Component Value Date    CHOL 129 01/21/2015     Lab Results   Component Value Date    HDL 46 09/18/2021    HDL 42 08/07/2020     Lab Results   Component Value Date    LDLCALC 73 09/18/2021    LDLCALC 87 08/07/2020     No results found for: LDLDIRECT          Lab Results   Component Value Date    TRIG 58 09/18/2021    TRIG 88 08/07/2020       Lab Results   Component Value Date    ALT 32 09/18/2021    ALT 32 07/15/2021    AST 22 09/18/2021    AST 21 07/15/2021       Lab Results   Component Value Date    INR 0 94 07/15/2021    INR 0 95 09/08/2020       Lab Results   Component Value Date    NTBNP 23 07/15/2021       Lab Results   Component Value Date    HGBA1C 5 3 02/03/2020           Imaging: Reviewed in epic        Review of Systems:  14 systems reviewed and negative with exception of the above        PHYSICAL EXAM:      Vitals:    01/24/22 0918   BP: 108/64   Pulse: 58     Body mass index is 33 kg/m²  Weight (last 2 days)     Date/Time Weight    01/24/22 0918 117 (257)            Gen: No acute distress  HEENT: anicteric, mucous membranes moist  Neck: supple, no jugular venous distention, or carotid bruit  Heart: regular, normal s1 and s2, no murmur/rub or gallop  Lungs :clear to auscultation bilaterally, no rales/rhonchi or wheeze  Abdomen: soft nontender, normoactive bowel sounds, no organomegaly  Ext: warm and perfused, normal femoral pulses, no edema, or clubbing  Skin: warm, no rashes  Neuro: AAO x 3, no focal findings  Psychiatric: normal affect  Musculoskeletal: no obvious joint deformities  This note was completed in part utilizing [employer] direct voice recognition software  Grammatical errors, random word insertion, spelling mistakes, and incomplete sentences may be an occasional consequence of the system secondary to software limitations, ambient noise and hardware issues   At the time of dictation, efforts were made to edit, clarify and /or correct errors  Please read the chart carefully and recognize, using context, where substitutions have occurred  If you have any questions or concerns about the context, text or information contained within the body of this dictation, please contact myself, the provider, for further clarification

## 2022-02-18 DIAGNOSIS — F51.01 PRIMARY INSOMNIA: ICD-10-CM

## 2022-02-18 RX ORDER — ZOLPIDEM TARTRATE 10 MG/1
10 TABLET ORAL
Qty: 30 TABLET | Refills: 2 | Status: SHIPPED | OUTPATIENT
Start: 2022-02-18 | End: 2022-06-02 | Stop reason: SDUPTHER

## 2022-02-23 DIAGNOSIS — E78.5 HYPERLIPIDEMIA, UNSPECIFIED HYPERLIPIDEMIA TYPE: ICD-10-CM

## 2022-02-23 RX ORDER — ROSUVASTATIN CALCIUM 20 MG/1
40 TABLET, COATED ORAL DAILY
Qty: 180 TABLET | Refills: 3 | Status: SHIPPED | OUTPATIENT
Start: 2022-02-23 | End: 2022-02-25

## 2022-02-25 DIAGNOSIS — E78.5 HYPERLIPIDEMIA, UNSPECIFIED HYPERLIPIDEMIA TYPE: ICD-10-CM

## 2022-02-25 RX ORDER — ROSUVASTATIN CALCIUM 20 MG/1
TABLET, COATED ORAL
Qty: 180 TABLET | Refills: 3 | Status: SHIPPED | OUTPATIENT
Start: 2022-02-25 | End: 2022-03-29

## 2022-03-10 ENCOUNTER — RA CDI HCC (OUTPATIENT)
Dept: OTHER | Facility: HOSPITAL | Age: 52
End: 2022-03-10

## 2022-03-10 NOTE — PROGRESS NOTES
[facility]  coding opportunities          Number of diagnosis code(s) already on the problem list added to FYI fla     Based on the patient problem list the following diagnoses are shown as active but not on the BPA      F32 0 Current mild episode of major depressive disorder without prior episode [facility])    If this is correct please assess and document at your next visit  3/17/22               Patients insurance company: [address]  (Medicare Advantage and Commercial)

## 2022-03-16 ENCOUNTER — APPOINTMENT (OUTPATIENT)
Dept: LAB | Facility: CLINIC | Age: 52
End: 2022-03-16
Payer: COMMERCIAL

## 2022-03-16 DIAGNOSIS — I25.10 ARTERIOSCLEROTIC CARDIOVASCULAR DISEASE: ICD-10-CM

## 2022-03-16 DIAGNOSIS — E78.2 MIXED HYPERLIPIDEMIA: ICD-10-CM

## 2022-03-16 DIAGNOSIS — Z95.1 S/P CABG X 4: ICD-10-CM

## 2022-03-16 LAB
CHOLEST SERPL-MCNC: 137 MG/DL
HDLC SERPL-MCNC: 45 MG/DL
LDLC SERPL CALC-MCNC: 77 MG/DL (ref 0–100)
TRIGL SERPL-MCNC: 74 MG/DL

## 2022-03-16 PROCEDURE — 80061 LIPID PANEL: CPT

## 2022-03-16 PROCEDURE — 36415 COLL VENOUS BLD VENIPUNCTURE: CPT

## 2022-03-17 ENCOUNTER — OFFICE VISIT (OUTPATIENT)
Dept: FAMILY MEDICINE CLINIC | Facility: CLINIC | Age: 52
End: 2022-03-17
Payer: COMMERCIAL

## 2022-03-17 ENCOUNTER — TELEPHONE (OUTPATIENT)
Dept: CARDIOLOGY CLINIC | Facility: CLINIC | Age: 52
End: 2022-03-17

## 2022-03-17 VITALS
TEMPERATURE: 98.6 F | SYSTOLIC BLOOD PRESSURE: 120 MMHG | HEIGHT: 74 IN | DIASTOLIC BLOOD PRESSURE: 80 MMHG | BODY MASS INDEX: 33.62 KG/M2 | HEART RATE: 60 BPM | WEIGHT: 262 LBS

## 2022-03-17 DIAGNOSIS — I25.10 ARTERIOSCLEROTIC CARDIOVASCULAR DISEASE: Primary | ICD-10-CM

## 2022-03-17 DIAGNOSIS — D69.3 CHRONIC ITP (IDIOPATHIC THROMBOCYTOPENIA) (HCC): ICD-10-CM

## 2022-03-17 DIAGNOSIS — I10 HYPERTENSION, UNSPECIFIED TYPE: ICD-10-CM

## 2022-03-17 DIAGNOSIS — E78.2 MIXED HYPERLIPIDEMIA: ICD-10-CM

## 2022-03-17 DIAGNOSIS — F32.0 CURRENT MILD EPISODE OF MAJOR DEPRESSIVE DISORDER WITHOUT PRIOR EPISODE (HCC): ICD-10-CM

## 2022-03-17 PROCEDURE — 3725F SCREEN DEPRESSION PERFORMED: CPT | Performed by: FAMILY MEDICINE

## 2022-03-17 PROCEDURE — 3079F DIAST BP 80-89 MM HG: CPT | Performed by: FAMILY MEDICINE

## 2022-03-17 PROCEDURE — 99214 OFFICE O/P EST MOD 30 MIN: CPT | Performed by: FAMILY MEDICINE

## 2022-03-17 PROCEDURE — 3074F SYST BP LT 130 MM HG: CPT | Performed by: FAMILY MEDICINE

## 2022-03-17 PROCEDURE — 3008F BODY MASS INDEX DOCD: CPT | Performed by: FAMILY MEDICINE

## 2022-03-17 NOTE — ASSESSMENT & PLAN NOTE
Asymptomatic  LDL just a little high  I reviewed with pt  F/u with Cardio  Continue to work on diet and weight loss   Recheck 6m

## 2022-03-17 NOTE — PROGRESS NOTES
Assessment/Plan:    Hypertension  Well controlled  Cont present treatment  Monitor labs  Recheck 6m      Arteriosclerotic cardiovascular disease  Asymptomatic  LDL just a little high  I reviewed with pt  F/u with Cardio  Continue to work on diet and weight loss  Recheck 6m    Chronic ITP (idiopathic thrombocytopenia) (HCC)  No bruising or signs of bleeding  Check CBC  Continue to monitor    Hyperlipidemia  Sl above goal  Pt to discuss changing meds with Cardio  I discussed treatment options  Recheck 6m    Current mild episode of major depressive disorder without prior episode ([facility] )  Depression Screening Follow-up Plan: Patient's depression screening was positive with a PHQ-2 score of   Their PHQ-9 score was 8  Patient assessed for underlying major depression  They have no active suicidal ideations  Brief counseling provided and recommend additional follow-up/re-evaluation next office visit  Diagnoses and all orders for this visit:    Arteriosclerotic cardiovascular disease    Chronic ITP (idiopathic thrombocytopenia) (HCC)    Hypertension, unspecified type  -     CBC and differential; Future  -     Comprehensive metabolic panel; Future  -     TSH, 3rd generation with Free T4 reflex; Future    Mixed hyperlipidemia  -     TSH, 3rd generation with Free T4 reflex; Future    Current mild episode of major depressive disorder without prior episode (HCC)  -     TSH, 3rd generation with Free T4 reflex; Future          Subjective:      Patient ID: Awa Agustin is a 46 y o  male  f/u multiple med issues  - pt COVID back in December but has fully recovered without sequelae  - pt still paying tennis for exercise  Pt finds that recovery has slowed after exercising  Denies CP, palpitations, lightheadedness or other CV symptoms with or without exertion  Up to date with Cardio  - pt trying to improve diet but finds that he binge eats when his mood is poor  PHQ done  - still with occ foot/ankle pain   Improved with rest  - pt released by Hem  We will montior plt counts and refer back if plt counts drop  No rashes, bruising or signs of bleeding    - no other concerns      The following portions of the patient's history were reviewed and updated as appropriate:   He  has a past medical history of [facility] ), High blood pressure, Irregular heartbeat, ST elevation (STEMI) myocardial infarction of unspecified site [facility]), and Valvular heart disease  He   Patient Active Problem List    Diagnosis Date Noted    Palpitations 07/21/2021    Current mild episode of major depressive disorder without prior episode ([facility] ) 02/21/2021    Memory dysfunction 02/21/2021    Chronic [facility]P [facility]idiopathic thrombocytopenia) ([facility]) 09/16/2020    S/P CABG x 4 ( Saphenous vein graft to diagonal 1, saphenous vein graft to OM3, radial to ramus intermedius, LIMA to LAD) 2012 12/13/2018    Hypertension 12/13/2018    Arteriosclerotic cardiovascular disease 08/02/2012    Hyperlipidemia 08/02/2012    Insomnia 08/02/2012     He  has a past surgical history that includes Coronary artery bypass graft; Coronary artery bypass graft (02/29/2012); Coronary angioplasty with stent (02/21/2012); Coronary angioplasty (02/21/2012); Cardiac surgery; and Vasectomy (10/27/2015)  He  reports that he has never smoked  He has never used smokeless tobacco  He reports current alcohol use  He reports that he does not use drugs    Current Outpatient Medications   Medication Sig Dispense Refill    aspirin 81 mg chewable tablet Chew 1 tablet daily      ezetimibe (ZETIA) 10 mg tablet take 1 tablet by mouth once daily 90 tablet 2    metoprolol succinate (TOPROL-XL) 25 mg 24 hr tablet Take 1 tablet (25 mg total) by mouth daily 90 tablet 3    Multiple Vitamins-Minerals (MULTIVITAMIN WITH MINERALS) tablet Take 2 tablets by mouth daily      rosuvastatin (CRESTOR) 20 MG tablet TAKE 2 TABLETS BY MOUTH ONCE DAILY 180 tablet 3    zolpidem (AMBIEN) 10 mg tablet Take 1 tablet (10 mg total) by mouth daily at bedtime as needed for sleep 30 tablet 2     No current facility-administered medications for this visit  He has No Known Allergies       Review of Systems   Constitutional: Positive for fatigue (mild)  Negative for activity change, appetite change, chills, fever and unexpected weight change  HENT: Negative  Eyes: Negative  Respiratory: Negative  Cardiovascular: Negative  Gastrointestinal: Negative  Endocrine: Negative  Genitourinary: Negative  Musculoskeletal: Negative  Skin: Negative  Allergic/Immunologic: Negative  Neurological: Negative  Hematological: Negative  Psychiatric/Behavioral: Positive for dysphoric mood and sleep disturbance (chronic)  Negative for decreased concentration, self-injury and suicidal ideas  The patient is not nervous/anxious  Objective:      /80   Pulse 60   Temp 98 6 °F (37 °C)   Ht 6' 2" (1 88 m)   Wt 119 kg (262 lb)   BMI 33 64 kg/m²          Physical Exam  Vitals reviewed  Constitutional:       Appearance: He is well-developed  HENT:      Head: Normocephalic and atraumatic  Right Ear: Tympanic membrane, ear canal and external ear normal       Left Ear: Tympanic membrane, ear canal and external ear normal    Eyes:      General: No scleral icterus  Extraocular Movements: Extraocular movements intact  Conjunctiva/sclera: Conjunctivae normal       Pupils: Pupils are equal, round, and reactive to light  Neck:      Thyroid: No thyromegaly  Cardiovascular:      Rate and Rhythm: Normal rate and regular rhythm  Pulses: Normal pulses  Heart sounds: Normal heart sounds  No murmur heard  Pulmonary:      Effort: Pulmonary effort is normal       Breath sounds: Normal breath sounds  Abdominal:      General: Bowel sounds are normal  There is no distension  Palpations: Abdomen is soft  There is no mass  Tenderness: There is no abdominal tenderness   There is no right CVA tenderness or left CVA tenderness  Musculoskeletal:         General: No swelling, tenderness or deformity  Normal range of motion  Cervical back: Normal range of motion and neck supple  No tenderness  No muscular tenderness  Right lower leg: No edema  Left lower leg: No edema  Lymphadenopathy:      Cervical: No cervical adenopathy  Skin:     General: Skin is warm and dry  Capillary Refill: Capillary refill takes less than 2 seconds  Neurological:      Mental Status: He is alert and oriented to person, place, and time  Cranial Nerves: No cranial nerve deficit  Sensory: No sensory deficit  Motor: No weakness  Gait: Gait normal    Psychiatric:         Mood and Affect: Mood normal          Behavior: Behavior normal          Thought Content: Thought content normal          Judgment: Judgment normal       Comments: PHQ-2/9 Depression Screening    Little interest or pleasure in doing things: 2 - more than half the days  Feeling down, depressed, or hopeless: 1 - several days  Trouble falling or staying asleep, or sleeping too much: 1 - several days  Feeling tired or having little energy: 0 - not at all  Poor appetite or overeatin - more than half the days  Feeling bad about yourself - or that you are a failure or have let   yourself or your family down: 1 - several days  Trouble concentrating on things, such as reading the newspaper or watching   television: 0 - not at all  Moving or speaking so slowly that other people could have noticed   Or the   opposite - being so fidgety or restless that you have been moving around a   lot more than usual: 1 - several days  Thoughts that you would be better off dead, or of hurting yourself in some   way: 0 - not at all  PHQ-9 Score: 8   PHQ-9 Interpretation: Mild depression

## 2022-03-17 NOTE — TELEPHONE ENCOUNTER
Christiano Szymanski, DO  [facility]  Please let patient know cholesterol levels about the same on increased crestor with zetia  Could consider injectable repatha as we discussed to achieve LDL <70  We would stop zetia and decrease crestor to 20mg if he chooses to do so  Otherwise keep meds all same if doesn't want to use repatha

## 2022-03-17 NOTE — ASSESSMENT & PLAN NOTE
Depression Screening Follow-up Plan: Patient's depression screening was positive with a PHQ-2 score of   Their PHQ-9 score was 8  Patient assessed for underlying major depression  They have no active suicidal ideations  Brief counseling provided and recommend additional follow-up/re-evaluation next office visit

## 2022-03-24 NOTE — TELEPHONE ENCOUNTER
I called Sharmin Courtney a third time  He said he "just flew in today "  He is willing to try [address]  Please enter script for that and for 20 mg Crestor -take 1 tablet daily  Send to [employer] on [address]      I didn't put in refill since I don't know repatha dosage

## 2022-03-29 ENCOUNTER — DOCUMENTATION (OUTPATIENT)
Dept: CARDIAC SURGERY | Facility: CLINIC | Age: 52
End: 2022-03-29

## 2022-03-29 DIAGNOSIS — E78.2 MIXED HYPERLIPIDEMIA: Primary | ICD-10-CM

## 2022-03-29 RX ORDER — EVOLOCUMAB 140 MG/ML
1 INJECTION, SOLUTION SUBCUTANEOUS
Qty: 6 ML | Refills: 3 | Status: SHIPPED | OUTPATIENT
Start: 2022-03-29 | End: 2023-03-29

## 2022-03-29 RX ORDER — ROSUVASTATIN CALCIUM 20 MG/1
20 TABLET, COATED ORAL DAILY
Qty: 90 TABLET | Refills: 3 | Status: SHIPPED | OUTPATIENT
Start: 2022-03-29 | End: 2023-03-29

## 2022-06-02 DIAGNOSIS — F51.01 PRIMARY INSOMNIA: ICD-10-CM

## 2022-06-02 RX ORDER — ZOLPIDEM TARTRATE 10 MG/1
10 TABLET ORAL
Qty: 30 TABLET | Refills: 2 | Status: SHIPPED | OUTPATIENT
Start: 2022-06-02

## 2022-06-02 NOTE — TELEPHONE ENCOUNTER
04/18/2022 02/18/2022 Zolpidem Tartrate (Tablet)  30 0 30 10 MG NA MINGO SHEA POGODZINSKI  [facility]

## 2022-09-13 DIAGNOSIS — F51.01 PRIMARY INSOMNIA: ICD-10-CM

## 2022-09-14 RX ORDER — ZOLPIDEM TARTRATE 10 MG/1
10 TABLET ORAL
Qty: 30 TABLET | Refills: 2 | Status: SHIPPED | OUTPATIENT
Start: 2022-09-14

## 2022-09-27 ENCOUNTER — RA CDI HCC (OUTPATIENT)
Dept: OTHER | Facility: HOSPITAL | Age: 52
End: 2022-09-27

## 2022-09-27 NOTE — PROGRESS NOTES
[facility] 75  coding opportunities       Chart reviewed, no opportunity found: CHART REVIEWED, NO OPPORTUNITY FOUND        Patients Insurance        Commercial Insurance: [employer]

## 2022-09-30 ENCOUNTER — OFFICE VISIT (OUTPATIENT)
Dept: FAMILY MEDICINE CLINIC | Facility: CLINIC | Age: 52
End: 2022-09-30
Payer: COMMERCIAL

## 2022-09-30 VITALS
SYSTOLIC BLOOD PRESSURE: 118 MMHG | BODY MASS INDEX: 32.93 KG/M2 | DIASTOLIC BLOOD PRESSURE: 82 MMHG | HEART RATE: 61 BPM | WEIGHT: 256.6 LBS | TEMPERATURE: 98.1 F | HEIGHT: 74 IN | OXYGEN SATURATION: 99 %

## 2022-09-30 DIAGNOSIS — F32.0 CURRENT MILD EPISODE OF MAJOR DEPRESSIVE DISORDER WITHOUT PRIOR EPISODE (HCC): ICD-10-CM

## 2022-09-30 DIAGNOSIS — N52.9 ERECTILE DYSFUNCTION, UNSPECIFIED ERECTILE DYSFUNCTION TYPE: ICD-10-CM

## 2022-09-30 DIAGNOSIS — F51.01 PRIMARY INSOMNIA: ICD-10-CM

## 2022-09-30 DIAGNOSIS — E78.2 MIXED HYPERLIPIDEMIA: ICD-10-CM

## 2022-09-30 DIAGNOSIS — I10 HYPERTENSION, UNSPECIFIED TYPE: ICD-10-CM

## 2022-09-30 DIAGNOSIS — Z00.00 ANNUAL PHYSICAL EXAM: Primary | ICD-10-CM

## 2022-09-30 DIAGNOSIS — Z23 IMMUNIZATION DUE: ICD-10-CM

## 2022-09-30 DIAGNOSIS — I25.10 ARTERIOSCLEROTIC CARDIOVASCULAR DISEASE: ICD-10-CM

## 2022-09-30 PROCEDURE — 90682 RIV4 VACC RECOMBINANT DNA IM: CPT | Performed by: FAMILY MEDICINE

## 2022-09-30 PROCEDURE — 90471 IMMUNIZATION ADMIN: CPT | Performed by: FAMILY MEDICINE

## 2022-09-30 PROCEDURE — 99396 PREV VISIT EST AGE 40-64: CPT | Performed by: FAMILY MEDICINE

## 2022-09-30 RX ORDER — SILDENAFIL 100 MG/1
100 TABLET, FILM COATED ORAL DAILY PRN
Qty: 30 TABLET | Refills: 0 | Status: SHIPPED | OUTPATIENT
Start: 2022-09-30

## 2022-09-30 NOTE — PROGRESS NOTES
[address]    NAME: Bharathi Sage  AGE: 46 y o  SEX: male  : 1970     DATE: 10/2/2022     Assessment and Plan:     Problem List Items Addressed This Visit        Cardiovascular and Mediastinum    Arteriosclerotic cardiovascular disease    Relevant Orders    CBC and differential    Comprehensive metabolic panel    Lipid panel    Hypertension    Relevant Orders    CBC and differential    Comprehensive metabolic panel    Lipid panel       Other    Current mild episode of major depressive disorder without prior episode ([address] )    Hyperlipidemia    Insomnia      Other Visit Diagnoses     Annual physical exam    -  Primary    Immunization due        Relevant Orders    influenza vaccine, quadrivalent, recombinant, PF, 0 5 mL, for patients 18 yr+ (FLUBLOK) (Completed)    Erectile dysfunction, unspecified erectile dysfunction type        Relevant Medications    sildenafil (VIAGRA) 100 mg tablet          Immunizations and preventive care screenings were discussed with patient today  Appropriate education was printed on patient's after visit summary  Counseling:  Exercise: the importance of regular exercise/physical activity was discussed  Recommend exercise 3-5 times per week for at least 30 minutes  BMI Counseling: Body mass index is 32 95 kg/m²  The BMI is above normal  Nutrition recommendations include reducing portion sizes, moderation in carbohydrate intake, increasing intake of lean protein, reducing intake of saturated fat and trans fat and reducing intake of cholesterol  Return in about 6 months (around 3/30/2023)  Chief Complaint:     Chief Complaint   Patient presents with    Physical Exam    shingles last month       History of Present Illness:     Adult Annual Physical   Patient here for a comprehensive physical exam  The patient reports no problems  - seeing Cardio once a year    Was not at goal for lipids, so Cardio decreased rosuvastatin to 20mg qd and start Repatha  However, insurance would not cover it    - had shingles 2m ago - resolved without sequelae  - would like flu shot    Diet and Physical Activity  Diet/Nutrition: well balanced diet and low fat diet  Exercise: moderate cardiovascular exercise, 3-4 times a week on average and 30-60 minutes on average  Depression Screening  PHQ-2/9 Depression Screening    Little interest or pleasure in doing things: 0 - not at all  Feeling down, depressed, or hopeless: 0 - not at all  Trouble falling or staying asleep, or sleeping too much: 2 - more than half the days  Feeling tired or having little energy: 0 - not at all  Poor appetite or overeatin - several days  Feeling bad about yourself - or that you are a failure or have let yourself or your family down: 0 - not at all  Trouble concentrating on things, such as reading the newspaper or watching television: 1 - several days  Moving or speaking so slowly that other people could have noticed  Or the opposite - being so fidgety or restless that you have been moving around a lot more than usual: 0 - not at all  Thoughts that you would be better off dead, or of hurting yourself in some way: 0 - not at all  PHQ-9 Score: 4   PHQ-9 Interpretation: No or Minimal depression        General Health  Sleep: gets 7-8 hours of sleep on average and needs ambien to sleep well  Hearing: normal - bilateral   Vision: vision problems: sl worsening  Uses "readers" but notes that his distance vision is sl worse  Dental: no dental visits for >1 year and brushes teeth twice daily   Health  Symptoms include: none     Review of Systems:     Review of Systems   Constitutional: Negative  HENT: Negative  Eyes: Negative  Respiratory: Negative  Cardiovascular: Negative  Gastrointestinal: Negative  Endocrine: Negative  Genitourinary: Negative  Musculoskeletal: Negative  Skin: Negative  Allergic/Immunologic: Negative  Neurological: Negative  Hematological: Negative  Psychiatric/Behavioral: Negative         Past Medical History:     Past Medical History:   Diagnosis Date    Acute angina ([facility] )     High blood pressure     NOT HYPERTENSION    Irregular heartbeat     ST elevation (STEMI) myocardial infarction of unspecified site ([facility] )     Valvular heart disease       Past Surgical History:     Past Surgical History:   Procedure Laterality Date    CARDIAC SURGERY      OPEN HEART QUAD BYPASS; RESOLVED: 2012    CORONARY ANGIOPLASTY  02/21/2012    CORONARY ANGIOGRAPHY WITHOUT CONCOMITANT LEFT HEART CATHETERIZATION; 90% PROXIMAL LAD, 90% 1ST DIAG OSTIUM AND 95% PROXIMAL THIRD, 99% PROXIMAL RAMUS INTERMEDIUS    CORONARY ANGIOPLASTY WITH STENT PLACEMENT  02/21/2012    4 VESSEL DISEASE TO HAVE CABG; MANAGED BY: SHABBIR SNYDER    CORONARY ARTERY BYPASS GRAFT      LAST ASSESSED: 11/17/17    CORONARY ARTERY BYPASS GRAFT  02/29/2012    CABG X4 (SVG TO 1ST DIAGNOL, SVG TO 3RD OBTUSE MARGINAL, FREE RADIAL ARTERY TO RAMUS INTERMEDIUS, SULLIVAN TO LAD) BY DR Lorenzo Morejon 2012     VASECTOMY  10/27/2015    SURGERY VAS DEFERENS; MANAGED BY: Simi Santiago; LAST ASSESSED: 10/27/15      Family History:     Family History   Problem Relation Age of Onset    Coronary artery disease Mother     Heart disease Mother     Hypertension Mother       Social History:     Social History     Socioeconomic History    Marital status: /Civil Union     Spouse name: None    Number of children: 2    Years of education: None    Highest education level: None   Occupational History    Occupation: TRAVEL    Tobacco Use    Smoking status: Never Smoker    Smokeless tobacco: Never Used   Vaping Use    Vaping Use: Never used   Substance and Sexual Activity    Alcohol use: Yes     Comment: BEING A SOCIAL DRINKER: <= 1 DRINK/WEEK    Drug use: Never    Sexual activity: Yes   Other Topics Concern    None   Social History Narrative    NO DAILY COFFEE CONSUMPTION (___CUPS/DAY)    NO DAILY COLA CONSUMPTION (___CANS/DAY)    DAILY TEA CONSUMPTION (___CUPS/DAY)     Social Determinants of Health     Financial Resource Strain: Not on file   Food Insecurity: Not on file   Transportation Needs: Not on file   Physical Activity: Not on file   Stress: Not on file   Social Connections: Not on file   Intimate Partner Violence: Not on file   Housing Stability: Not on file      Current Medications:     Current Outpatient Medications   Medication Sig Dispense Refill    aspirin 81 mg chewable tablet Chew 1 tablet daily      metoprolol succinate (TOPROL-XL) 25 mg 24 hr tablet Take 1 tablet (25 mg total) by mouth daily 90 tablet 3    Multiple Vitamins-Minerals (MULTIVITAMIN WITH MINERALS) tablet Take 2 tablets by mouth daily      rosuvastatin (CRESTOR) 20 MG tablet Take 1 tablet (20 mg total) by mouth daily 90 tablet 3    sildenafil (VIAGRA) 100 mg tablet Take 1 tablet (100 mg total) by mouth daily as needed for erectile dysfunction 30 tablet 0    zolpidem (AMBIEN) 10 mg tablet Take 1 tablet (10 mg total) by mouth daily at bedtime as needed for sleep 30 tablet 2    Evolocumab (Repatha) 140 MG/ML SOSY Inject 1 mL (140 mg total) under the skin every 14 (fourteen) days (Patient not taking: Reported on 9/30/2022) 6 mL 3    ezetimibe (ZETIA) 10 mg tablet take 1 tablet by mouth once daily (Patient not taking: Reported on 9/30/2022) 90 tablet 2     No current facility-administered medications for this visit  Allergies:     No Known Allergies   Physical Exam:     /82   Pulse 61   Temp 98 1 °F (36 7 °C)   Ht 6' 2" (1 88 m)   Wt 116 kg (256 lb 9 6 oz)   SpO2 99%   BMI 32 95 kg/m²     Physical Exam  Vitals reviewed  Constitutional:       Appearance: He is well-developed  HENT:      Head: Normocephalic and atraumatic        Right Ear: Tympanic membrane, ear canal and external ear normal       Left Ear: Tympanic membrane, ear canal and external ear normal       Mouth/Throat:      Mouth: Mucous membranes are moist    Eyes:      Extraocular Movements: Extraocular movements intact  Conjunctiva/sclera: Conjunctivae normal       Pupils: Pupils are equal, round, and reactive to light  Neck:      Thyroid: No thyromegaly  Vascular: No JVD  Cardiovascular:      Rate and Rhythm: Normal rate and regular rhythm  Pulses: Normal pulses  Heart sounds: Normal heart sounds  No murmur heard  Pulmonary:      Effort: Pulmonary effort is normal  No respiratory distress  Breath sounds: Normal breath sounds  No wheezing or rales  Abdominal:      General: Bowel sounds are normal  There is no distension  Palpations: Abdomen is soft  There is no mass  Tenderness: There is no abdominal tenderness  Musculoskeletal:         General: No swelling, tenderness or deformity  Normal range of motion  Cervical back: Normal range of motion and neck supple  No muscular tenderness  Right lower leg: No edema  Left lower leg: No edema  Lymphadenopathy:      Cervical: No cervical adenopathy  Skin:     General: Skin is warm  Capillary Refill: Capillary refill takes less than 2 seconds  Neurological:      Mental Status: He is alert and oriented to person, place, and time  Cranial Nerves: No cranial nerve deficit  Sensory: No sensory deficit  Motor: No weakness or abnormal muscle tone  Coordination: Coordination normal       Gait: Gait normal       Deep Tendon Reflexes: Reflexes normal    Psychiatric:         Mood and Affect: Mood normal          Behavior: Behavior normal          Thought Content:  Thought content normal          Judgment: Judgment normal           MD Adonay Holliday

## 2022-09-30 NOTE — PATIENT INSTRUCTIONS

## 2022-10-28 ENCOUNTER — APPOINTMENT (OUTPATIENT)
Dept: LAB | Facility: CLINIC | Age: 52
End: 2022-10-28
Payer: COMMERCIAL

## 2022-10-28 DIAGNOSIS — I10 HYPERTENSION, UNSPECIFIED TYPE: ICD-10-CM

## 2022-10-28 DIAGNOSIS — I25.10 ARTERIOSCLEROTIC CARDIOVASCULAR DISEASE: ICD-10-CM

## 2022-10-28 DIAGNOSIS — F32.0 CURRENT MILD EPISODE OF MAJOR DEPRESSIVE DISORDER WITHOUT PRIOR EPISODE (HCC): ICD-10-CM

## 2022-10-28 DIAGNOSIS — E78.2 MIXED HYPERLIPIDEMIA: ICD-10-CM

## 2022-10-28 LAB
ALBUMIN SERPL BCP-MCNC: 4.4 G/DL (ref 3.5–5)
ALP SERPL-CCNC: 50 U/L (ref 34–104)
ALT SERPL W P-5'-P-CCNC: 22 U/L (ref 7–52)
ANION GAP SERPL CALCULATED.3IONS-SCNC: 5 MMOL/L (ref 4–13)
AST SERPL W P-5'-P-CCNC: 22 U/L (ref 13–39)
BASOPHILS # BLD AUTO: 0.02 THOUSANDS/ÂΜL (ref 0–0.1)
BASOPHILS NFR BLD AUTO: 0 % (ref 0–1)
BILIRUB SERPL-MCNC: 0.53 MG/DL (ref 0.2–1)
BUN SERPL-MCNC: 14 MG/DL (ref 5–25)
CALCIUM SERPL-MCNC: 9.4 MG/DL (ref 8.4–10.2)
CHLORIDE SERPL-SCNC: 106 MMOL/L (ref 96–108)
CHOLEST SERPL-MCNC: 163 MG/DL
CO2 SERPL-SCNC: 29 MMOL/L (ref 21–32)
CREAT SERPL-MCNC: 0.9 MG/DL (ref 0.6–1.3)
EOSINOPHIL # BLD AUTO: 0.15 THOUSAND/ÂΜL (ref 0–0.61)
EOSINOPHIL NFR BLD AUTO: 3 % (ref 0–6)
ERYTHROCYTE [DISTWIDTH] IN BLOOD BY AUTOMATED COUNT: 12.2 % (ref 11.6–15.1)
GFR SERPL CREATININE-BSD FRML MDRD: 97 ML/MIN/1.73SQ M
GLUCOSE P FAST SERPL-MCNC: 101 MG/DL (ref 65–99)
HCT VFR BLD AUTO: 44.6 % (ref 36.5–49.3)
HDLC SERPL-MCNC: 38 MG/DL
HGB BLD-MCNC: 15.1 G/DL (ref 12–17)
IMM GRANULOCYTES # BLD AUTO: 0.01 THOUSAND/UL (ref 0–0.2)
IMM GRANULOCYTES NFR BLD AUTO: 0 % (ref 0–2)
LDLC SERPL CALC-MCNC: 100 MG/DL (ref 0–100)
LYMPHOCYTES # BLD AUTO: 1.53 THOUSANDS/ÂΜL (ref 0.6–4.47)
LYMPHOCYTES NFR BLD AUTO: 28 % (ref 14–44)
MCH RBC QN AUTO: 31 PG (ref 26.8–34.3)
MCHC RBC AUTO-ENTMCNC: 33.9 G/DL (ref 31.4–37.4)
MCV RBC AUTO: 92 FL (ref 82–98)
MONOCYTES # BLD AUTO: 0.44 THOUSAND/ÂΜL (ref 0.17–1.22)
MONOCYTES NFR BLD AUTO: 8 % (ref 4–12)
NEUTROPHILS # BLD AUTO: 3.35 THOUSANDS/ÂΜL (ref 1.85–7.62)
NEUTS SEG NFR BLD AUTO: 61 % (ref 43–75)
NONHDLC SERPL-MCNC: 125 MG/DL
NRBC BLD AUTO-RTO: 0 /100 WBCS
PLATELET # BLD AUTO: 140 THOUSANDS/UL (ref 149–390)
PMV BLD AUTO: 10.7 FL (ref 8.9–12.7)
POTASSIUM SERPL-SCNC: 4.6 MMOL/L (ref 3.5–5.3)
PROT SERPL-MCNC: 7.4 G/DL (ref 6.4–8.4)
RBC # BLD AUTO: 4.87 MILLION/UL (ref 3.88–5.62)
SODIUM SERPL-SCNC: 140 MMOL/L (ref 135–147)
TRIGL SERPL-MCNC: 126 MG/DL
TSH SERPL DL<=0.05 MIU/L-ACNC: 2.49 UIU/ML (ref 0.45–4.5)
WBC # BLD AUTO: 5.5 THOUSAND/UL (ref 4.31–10.16)

## 2022-10-28 PROCEDURE — 84443 ASSAY THYROID STIM HORMONE: CPT

## 2022-10-28 PROCEDURE — 85025 COMPLETE CBC W/AUTO DIFF WBC: CPT

## 2022-10-28 PROCEDURE — 80061 LIPID PANEL: CPT

## 2022-10-28 PROCEDURE — 36415 COLL VENOUS BLD VENIPUNCTURE: CPT

## 2022-10-28 PROCEDURE — 80053 COMPREHEN METABOLIC PANEL: CPT

## 2022-12-12 DIAGNOSIS — F51.01 PRIMARY INSOMNIA: ICD-10-CM

## 2022-12-13 RX ORDER — ZOLPIDEM TARTRATE 10 MG/1
10 TABLET ORAL
Qty: 30 TABLET | Refills: 0 | Status: SHIPPED | OUTPATIENT
Start: 2022-12-13

## 2023-01-05 DIAGNOSIS — I10 ESSENTIAL HYPERTENSION: ICD-10-CM

## 2023-01-05 RX ORDER — METOPROLOL SUCCINATE 25 MG/1
25 TABLET, EXTENDED RELEASE ORAL DAILY
Qty: 90 TABLET | Refills: 0 | Status: SHIPPED | OUTPATIENT
Start: 2023-01-05

## 2023-01-11 DIAGNOSIS — F51.01 PRIMARY INSOMNIA: ICD-10-CM

## 2023-01-12 RX ORDER — ZOLPIDEM TARTRATE 10 MG/1
10 TABLET ORAL
Qty: 30 TABLET | Refills: 0 | Status: SHIPPED | OUTPATIENT
Start: 2023-01-12

## 2023-01-12 NOTE — TELEPHONE ENCOUNTER
1 [id number] 12/14/2022 12/13/2022 Zolpidem Tartrate (Tablet) 30 0 30 10 MG NA MINGO SHEA POGODZINSKI [facility]  [employer] [facility] 0 / 0 [place]    1 [id number] 11/14/2022 09/14/2022 Zolpidem Tartrate (Tablet) 30 0 30 10 MG NA MINGO SHEA POGODZINSKI [facility]  [employer] [facility] 2 / 2 [place]    1 [id number] 10/16/2022 09/14/2022 Zolpidem Tartrate (Tablet) 30 0 30 10 MG NA MINGO SHEA POGODZINSKI [facility]   [employer] [facility] 1 / 2 [place]

## 2023-01-24 ENCOUNTER — OFFICE VISIT (OUTPATIENT)
Dept: CARDIAC SURGERY | Facility: CLINIC | Age: 53
End: 2023-01-24

## 2023-01-24 VITALS
BODY MASS INDEX: 33.86 KG/M2 | DIASTOLIC BLOOD PRESSURE: 74 MMHG | HEIGHT: 74 IN | HEART RATE: 56 BPM | OXYGEN SATURATION: 99 % | WEIGHT: 263.8 LBS | SYSTOLIC BLOOD PRESSURE: 126 MMHG | TEMPERATURE: 96.8 F

## 2023-01-24 DIAGNOSIS — E78.2 MIXED HYPERLIPIDEMIA: ICD-10-CM

## 2023-01-24 DIAGNOSIS — I10 HYPERTENSION, UNSPECIFIED TYPE: ICD-10-CM

## 2023-01-24 DIAGNOSIS — Z95.1 S/P CABG X 4: ICD-10-CM

## 2023-01-24 DIAGNOSIS — I25.10 CORONARY ARTERY DISEASE INVOLVING NATIVE CORONARY ARTERY OF NATIVE HEART WITHOUT ANGINA PECTORIS: ICD-10-CM

## 2023-01-24 DIAGNOSIS — I25.10 ARTERIOSCLEROTIC CARDIOVASCULAR DISEASE: Primary | ICD-10-CM

## 2023-01-24 RX ORDER — EVOLOCUMAB 140 MG/ML
1 INJECTION, SOLUTION SUBCUTANEOUS
Qty: 6 ML | Refills: 3 | Status: SHIPPED | OUTPATIENT
Start: 2023-01-24 | End: 2024-01-24

## 2023-01-24 RX ORDER — FLUTICASONE PROPIONATE 50 MCG
SPRAY, SUSPENSION (ML) NASAL
COMMUNITY
Start: 2022-12-29

## 2023-01-24 NOTE — PROGRESS NOTES
Cardiology Follow Up Visit    Sara Weston  1970  [id number]  [id number] [facility] [address] [id number] [phone]    1  Arteriosclerotic cardiovascular disease  POCT ECG      2  Hypertension, unspecified type  POCT ECG      3  S/P CABG x 4 ( Saphenous vein graft to diagonal 1, saphenous vein graft to OM3, radial to ramus intermedius, LIMA to LAD) 2012        4  Coronary artery disease involving native coronary artery of native heart without angina pectoris        5  Mixed hyperlipidemia  Evolocumab (Repatha) 140 MG/ML SOS            Discussion/Summary:    1  CAD  A  CABG x 4 2012, after presenting with new onset angina and abnormal stress, nm stress surveillance normal 2020, normal lvef     2  Hyperlipidemia on rosuva 20/zetia 10 LDL improved to low 70s from 87     3  Palpitations, resolved    Plan:      Try Repatha once again given elevated LDL on rosuvastatin 20 mg  He is off Zetia  Encouraged continued sensible/Mediterranean type diet daily activity and exercise and weight loss  Asymptomatic from a coronary artery disease standpoint  No testing required  Interval History:    20-year-old male history of CABG x4 2012 after presenting with new onset exertional chest discomfort and abnormal stress test which prompted cardiac catheterization  He has been doing well since    Insurance did not cover [address] [place] [address]  LDL remains at 100  And over 79  Pressure has been well controlled  Feels well, no complaints  Occasional easy bruising on aspirin 81 mg daily  ITP followed by Hematology  Vaginal bruising on his chest   Felt no need to follow-up with him as his platelets have been over 100,000          Patient Active Problem List   Diagnosis   • S/P CABG x 4 ( Saphenous vein graft to diagonal 1, saphenous vein graft to OM3, radial to ramus intermedius, LIMA to LAD) 2012   • Hypertension   • Arteriosclerotic cardiovascular disease   • Hyperlipidemia   • Insomnia   • Chronic ITP (idiopathic thrombocytopenia) (HCC)   • Current mild episode of major depressive disorder without prior episode ([address] )   • Memory dysfunction   • Palpitations     Past Medical History:   Diagnosis Date   • Acute angina (HCC)    • High blood pressure     NOT HYPERTENSION   • Irregular heartbeat    • ST elevation (STEMI) myocardial infarction of unspecified site [facility])    • Valvular heart disease      Social History     Socioeconomic History   • Marital status: /Civil Union     Spouse name: Not on file   • Number of children: 2   • Years of education: Not on file   • Highest education level: Not on file   Occupational History   • Occupation: TRAVEL    Tobacco Use   • Smoking status: Never   • Smokeless tobacco: Never   Vaping Use   • Vaping Use: Never used   Substance and Sexual Activity   • Alcohol use: Yes     Comment: BEING A SOCIAL DRINKER: <= 1 DRINK/WEEK   • Drug use: Never   • Sexual activity: Yes   Other Topics Concern   • Not on file   Social History Narrative    NO DAILY COFFEE CONSUMPTION (___CUPS/DAY)    NO DAILY COLA CONSUMPTION (___CANS/DAY)    DAILY TEA CONSUMPTION (___CUPS/DAY)     Social Determinants of Health     Financial Resource Strain: Not on file   Food Insecurity: Not on file   Transportation Needs: Not on file   Physical Activity: Not on file   Stress: Not on file   Social Connections: Not on file   Intimate Partner Violence: Not on file   Housing Stability: Not on file      Family History   Problem Relation Age of Onset   • Coronary artery disease Mother    • Heart disease Mother    • Hypertension Mother      Past Surgical History:   Procedure Laterality Date   • CARDIAC SURGERY      OPEN HEART QUAD BYPASS; RESOLVED: 2012   • CORONARY ANGIOPLASTY  02/21/2012    CORONARY ANGIOGRAPHY WITHOUT CONCOMITANT LEFT HEART CATHETERIZATION; 90% PROXIMAL LAD, 90% 1ST DIAG OSTIUM AND 95% PROXIMAL THIRD, 99% PROXIMAL RAMUS INTERMEDIUS   • CORONARY ANGIOPLASTY WITH STENT PLACEMENT  02/21/2012    4 VESSEL DISEASE TO HAVE CABG; MANAGED BY: SHABBIR SNYDER   • CORONARY ARTERY BYPASS GRAFT      LAST ASSESSED: 11/17/17   • CORONARY ARTERY BYPASS GRAFT  02/29/2012    CABG X4 (SVG TO 1ST DIAGNOL, SVG TO 3RD OBTUSE MARGINAL, FREE RADIAL ARTERY TO RAMUS INTERMEDIUS, SULLIVAN TO LAD) BY DR Henry Garay 2012    • VASECTOMY  10/27/2015    SURGERY VAS DEFERENS; MANAGED BY: Bonny Bacon; LAST ASSESSED: 10/27/15       Current Outpatient Medications:   •  aspirin 81 mg chewable tablet, Chew 1 tablet daily, Disp: , Rfl:   •  Evolocumab (Repatha) 140 MG/ML SOSY, Inject 1 mL (140 mg total) under the skin every 14 (fourteen) days, Disp: 6 mL, Rfl: 3  •  fluticasone (FLONASE) 50 mcg/act nasal spray, , Disp: , Rfl:   •  metoprolol succinate (TOPROL-XL) 25 mg 24 hr tablet, Take 1 tablet (25 mg total) by mouth daily, Disp: 90 tablet, Rfl: 0  •  Multiple Vitamins-Minerals (MULTIVITAMIN WITH MINERALS) tablet, Take 2 tablets by mouth daily, Disp: , Rfl:   •  rosuvastatin (CRESTOR) 20 MG tablet, Take 1 tablet (20 mg total) by mouth daily, Disp: 90 tablet, Rfl: 3  •  zolpidem (AMBIEN) 10 mg tablet, Take 1 tablet (10 mg total) by mouth daily at bedtime as needed for sleep, Disp: 30 tablet, Rfl: 0  •  ezetimibe (ZETIA) 10 mg tablet, take 1 tablet by mouth once daily (Patient not taking: Reported on 9/30/2022), Disp: 90 tablet, Rfl: 2  •  sildenafil (VIAGRA) 100 mg tablet, Take 1 tablet (100 mg total) by mouth daily as needed for erectile dysfunction (Patient not taking: Reported on 1/24/2023), Disp: 30 tablet, Rfl: 0  No Known Allergies      Social, Family, Medication history reviewed and updated as necessary      Labs:     Lab Results   Component Value Date     09/19/2015    K 4 6 10/28/2022     10/28/2022    CO2 29 10/28/2022    BUN 14 10/28/2022    CREATININE 0 90 10/28/2022    CREATININE 0 95 09/18/2021    GLUCOSE 82 09/19/2015    CALCIUM 9 4 10/28/2022       Lab Results   Component Value Date    WBC 5 50 10/28/2022    HGB 15 1 10/28/2022    HGB 14 5 09/18/2021    HCT 44 6 10/28/2022    HCT 42 1 09/18/2021     (L) 10/28/2022     (L) 09/18/2021       Lab Results   Component Value Date    CHOL 129 01/21/2015     Lab Results   Component Value Date    HDL 38 (L) 10/28/2022    HDL 45 03/16/2022     Lab Results   Component Value Date    LDLCALC 100 10/28/2022    LDLCALC 77 03/16/2022     No results found for: LDLDIRECT          Lab Results   Component Value Date    TRIG 126 10/28/2022    TRIG 74 03/16/2022       Lab Results   Component Value Date    ALT 22 10/28/2022    ALT 32 09/18/2021    AST 22 10/28/2022    AST 22 09/18/2021       Lab Results   Component Value Date    INR 0 94 07/15/2021    INR 0 95 09/08/2020       Lab Results   Component Value Date    NTBNP 23 07/15/2021       Lab Results   Component Value Date    HGBA1C 5 3 02/03/2020           Imaging: Reviewed in epic        Review of Systems:  14 systems reviewed and negative with exception of the above        PHYSICAL EXAM:      Vitals:    01/24/23 0835   BP: 126/74   Pulse: 56   Temp: (!) 96 8 °F (36 °C)   SpO2: 99%     Body mass index is 33 87 kg/m²  Weight (last 2 days)     Date/Time Weight    01/24/23 0835 120 (263 8)            Gen: No acute distress  HEENT: anicteric, mucous membranes moist  Neck: supple, no jugular venous distention, or carotid bruit  Heart: regular, normal s1 and s2, no murmur/rub or gallop  Lungs :clear to auscultation bilaterally, no rales/rhonchi or wheeze  Abdomen: soft nontender, normoactive bowel sounds, no organomegaly  Ext: warm and perfused, normal femoral pulses, no edema, or clubbing  Skin: warm, no rashes  Neuro: AAO x 3, no focal findings  Psychiatric: normal affect  Musculoskeletal: no obvious joint deformities          This note was completed in part utilizing m-modal fluency direct voice recognition software  Grammatical errors, random word insertion, spelling mistakes, and incomplete sentences may be an occasional consequence of the system secondary to software limitations, ambient noise and hardware issues  At the time of dictation, efforts were made to edit, clarify and /or correct errors  Please read the chart carefully and recognize, using context, where substitutions have occurred  If you have any questions or concerns about the context, text or information contained within the body of this dictation, please contact myself, the provider, for further clarification

## 2023-01-31 DIAGNOSIS — I10 ESSENTIAL HYPERTENSION: ICD-10-CM

## 2023-02-01 RX ORDER — METOPROLOL SUCCINATE 25 MG/1
25 TABLET, EXTENDED RELEASE ORAL DAILY
Qty: 90 TABLET | Refills: 0 | Status: SHIPPED | OUTPATIENT
Start: 2023-02-01

## 2023-03-11 DIAGNOSIS — F51.01 PRIMARY INSOMNIA: ICD-10-CM

## 2023-03-13 DIAGNOSIS — F51.01 PRIMARY INSOMNIA: ICD-10-CM

## 2023-03-13 RX ORDER — ZOLPIDEM TARTRATE 10 MG/1
10 TABLET ORAL
Qty: 30 TABLET | Refills: 0 | Status: SHIPPED | OUTPATIENT
Start: 2023-03-13

## 2023-03-13 NOTE — TELEPHONE ENCOUNTER
Patient Id Prescription #  Filled Written Drug Label Qty Days Strength MME** Prescriber Pharmacy Payment REFILL #/Auth State Detail   1  [id number] 02/10/2023  02/06/2023 Zolpidem Tartrate (Tablet)  30 0 30 10 MG NA MICHAEL POSADAS  [facility]  [email or web address] [facility] 0 / 0 [place]    1  [id number] 01/12/2023 01/12/2023 Zolpidem Tartrate (Tablet)  30 0 30 10 MG NA MINGO SHEA POGODZINSKI  [facility]  [email or web address] 0 / 0 [place]    1  [id number] 12/14/2022 12/13/2022 Zolpidem Tartrate (Tablet)  30 0 30 10 MG NA MINGO SHEA POGODZINSKI  [facility]  [email or web address] [facility] 0 / 0 [place]    1  [id number] 11/14/2022 09/14/2022 Zolpidem Tartrate (Tablet)  30 0 30 10 MG NA MINGO SHEA POGODZINSKI  [facility]  [email or web address]'  2 / 2 [place]    1  [id number] 10/16/2022  09/14/2022 Zolpidem Tartrate (Tablet)  30 0 30 10 MG MINGO THURMAN POGODZINSKI  [facility]  [email or web address]'  1 / 2 [place]    1  [id number] 09/14/2022 09/14/2022 Zolpidem Tartrate (Tablet)  30 0 30 10 MG MINGO HTURMAN POGODZINSKI  [facility]  [email or web address] [facility] 0 / 2 [place]    1  [id number] 08/15/2022  06/02/2022 Zolpidem Tartrate (Tablet)  30 0 30 10 MG MINGO THURMAN POGODZINSKI  [facility]  [email or web address] Us 2 / 2 [place]    1  [id number] 07/10/2022  06/02/2022 Zolpidem Tartrate (Tablet)  30 0 30 10 MG MINGO THURMAN POGODZINSKI  [facility]  [employer]  1 / 2 [place]    1  [id number] 06/02/2022 06/02/2022 Zolpidem Tartrate (Tablet)  30 0 30 10 MG NA MINGO SHEA POGODZINSKI  [facility]  [employer]  0 / 2 [place]    1  [id number] 04/18/2022 02/18/2022 Zolpidem Tartrate (Tablet)  30 0 30 10 MG NA MINGO SHEA POGODZINSKI  [facility]    [employer]  02 / 02 [place]

## 2023-03-13 NOTE — TELEPHONE ENCOUNTER
1  [id number] 02/10/2023  02/06/2023 Zolpidem Tartrate (Tablet)  30 0 30 10 MG NA MICHAEL POSADAS  [facility]  [employer] 0 / 0 [place]    1  [id number] 01/12/2023 01/12/2023 Zolpidem Tartrate (Tablet)  30 0 30 10 MG NA MINGO SHEA POGODZINSKI  [facility]  [employer] 0 / 0 [place]    1  [id number] 12/14/2022 12/13/2022 Zolpidem Tartrate (Tablet)  30 0 30 10 MG NA MINGO SHEA POGODZINSKI  [facility]    [employer] 0 / 0 [place]

## 2023-04-07 DIAGNOSIS — F51.01 PRIMARY INSOMNIA: ICD-10-CM

## 2023-04-07 RX ORDER — ZOLPIDEM TARTRATE 10 MG/1
10 TABLET ORAL
Qty: 30 TABLET | Refills: 0 | Status: SHIPPED | OUTPATIENT
Start: 2023-04-07

## 2023-04-07 NOTE — TELEPHONE ENCOUNTER
1  [id number] 03/13/2023 03/13/2023 Zolpidem Tartrate (Tablet)  30 0 30 10 MG NA MINGO SHEA POGODZINSKI  [facility]  [employer] 0 / 0 [place]    1  [id number] 02/10/2023  02/06/2023 Zolpidem Tartrate (Tablet)  30 0 30 10 MG NA MICHAEL POSADAS  [facility]  [employer] 0 / 0 [place]    1  [id number] 01/12/2023 01/12/2023 Zolpidem Tartrate (Tablet)  30 0 30 10 MG NA MINGO SHEA POGODZINSKI  [facility]  [employer] 0 / 0 [place]    1  [id number] 12/14/2022 12/13/2022 Zolpidem Tartrate (Tablet)  30 0 30 10 MG NA MINGO SHEA POGODZINSKI  [facility]  [employer] 0 / 0 [place]    1  [id number] 11/14/2022 09/14/2022 Zolpidem Tartrate (Tablet)  30 0 30 10 MG NA MINGO SHEA POGODZINSKI  [facility]  [employer] 2 / 2 [place]    1  [id number] 10/16/2022  09/14/2022 Zolpidem Tartrate (Tablet)  30 0 30 10 MG NA MINGO SHEA POGODZINSKI  [facility]  [employer] 1 / 2 [place]    1  [id number] 09/14/2022 09/14/2022 Zolpidem Tartrate (Tablet)  30 0 30 10 MG NA MINGO SHEA POGODZINSKI  [facility]  [employer] 0 / 2 [place]    1  [id number] 08/15/2022  06/02/2022 Zolpidem Tartrate (Tablet)  30 0 30 10 MG MINGO THURMAN POGODZINSKI  [facility]  [employer] 2 / 2 [place]    1  [id number] 07/10/2022  06/02/2022 Zolpidem Tartrate (Tablet)  30 0 30 10 MG NA MINGO SHEA POGODZINSKI  [facility]  [employer] 1 / 2 [place]    1  [id number] 06/02/2022 06/02/2022 Zolpidem Tartrate (Tablet)  30 0 30 10 MG NA MINGO SHEA POGODZINSKI  [facility]    [employer] 0 / 2 [place]

## 2023-05-08 ENCOUNTER — OFFICE VISIT (OUTPATIENT)
Dept: FAMILY MEDICINE CLINIC | Facility: CLINIC | Age: 53
End: 2023-05-08

## 2023-05-08 VITALS
SYSTOLIC BLOOD PRESSURE: 110 MMHG | HEART RATE: 60 BPM | BODY MASS INDEX: 32.73 KG/M2 | DIASTOLIC BLOOD PRESSURE: 70 MMHG | OXYGEN SATURATION: 98 % | WEIGHT: 255 LBS | HEIGHT: 74 IN | TEMPERATURE: 97.2 F

## 2023-05-08 DIAGNOSIS — I25.10 ARTERIOSCLEROTIC CARDIOVASCULAR DISEASE: Primary | ICD-10-CM

## 2023-05-08 DIAGNOSIS — H53.9 CHANGE IN VISION: ICD-10-CM

## 2023-05-08 DIAGNOSIS — I10 HYPERTENSION, UNSPECIFIED TYPE: ICD-10-CM

## 2023-05-08 DIAGNOSIS — D69.3 CHRONIC ITP (IDIOPATHIC THROMBOCYTOPENIA) (HCC): ICD-10-CM

## 2023-05-08 NOTE — PROGRESS NOTES
Name: Jade Magdaleno      : 1970      MRN: [id number]  Encounter Provider: Seble Perla MD  Encounter Date: 2023   Encounter department: [address] [place] [address]     1  Arteriosclerotic cardiovascular disease  Assessment & Plan:  Pt asymptomatic  Continue present care  F/u with Cardio  Monitor lipids  Recheck 6m    Orders:  -     CBC and differential; Future  -     Comprehensive metabolic panel; Future  -     Lipid panel; Future    2  Hypertension, unspecified type  Assessment & Plan:  Well controlled  Cont present treatment  Monitor labs  Recheck 6m        3  Chronic ITP (idiopathic thrombocytopenia) (HCC)  Assessment & Plan:  No signs of bleeding  Continue to monitor - check labs  Recheck 6m      4  Change in vision  Assessment & Plan:  Unclear cause  REC: refer to ophthalmology for further eval       Orders:  -     Ambulatory Referral to Ophthalmology; Future         Subjective     f/u multiple med issues  - pt feeling well  - pt has been working on weight loss though diet and exercise  Pt finds it more difficult than in the past  - pt walking 3mi a day and plays tennis 3x a week  Pt denies CP, palpitations, lightheadedness or other CV symptoms with or without exertion  Up to date with Cardio, who recently started pt on Repatha  Pt has not been able to get filled due to insurance issues  - mood stable  Doing better with binge eating  - still with occ foot pain  Ankle has improved  - pt states that his vision has worsened since his 30s  He now has significant loss on the L  Pt has seen [employer], but not an ophthalmologist  Pt would like to know if there is anything he can do to avoid total vision loss  Review of Systems   Constitutional: Negative  HENT: Negative  Eyes: Positive for visual disturbance  Respiratory: Negative  Cardiovascular: Negative  Gastrointestinal: Negative  Endocrine: Negative      Genitourinary: Negative  Musculoskeletal: Positive for arthralgias  Negative for back pain, gait problem, joint swelling and myalgias  Skin: Negative  Allergic/Immunologic: Negative  Neurological: Negative  Hematological: Negative  Psychiatric/Behavioral: Negative  Past Medical History:   Diagnosis Date   • Acute angina (HCC)    • Allergic    • High blood pressure     NOT HYPERTENSION   • Irregular heartbeat    • ST elevation (STEMI) myocardial infarction of unspecified site [facility])    • Valvular heart disease      Past Surgical History:   Procedure Laterality Date   • CARDIAC SURGERY      OPEN HEART QUAD BYPASS; RESOLVED: 2012   • CORONARY ANGIOPLASTY  02/21/2012    CORONARY ANGIOGRAPHY WITHOUT CONCOMITANT LEFT HEART CATHETERIZATION; 90% PROXIMAL LAD, 90% 1ST DIAG OSTIUM AND 95% PROXIMAL THIRD, 99% PROXIMAL RAMUS INTERMEDIUS   • CORONARY ANGIOPLASTY WITH STENT PLACEMENT  02/21/2012    4 VESSEL DISEASE TO HAVE CABG; MANAGED BY: SHABBIR SNYDER   • CORONARY ARTERY BYPASS GRAFT      LAST ASSESSED: 11/17/17   • CORONARY ARTERY BYPASS GRAFT  02/29/2012    CABG X4 (SVG TO 1ST DIAGNOL, SVG TO 3RD OBTUSE MARGINAL, FREE RADIAL ARTERY TO RAMUS INTERMEDIUS, SULLIVAN TO LAD) BY DR Angelica Jacobs 2012    • VASECTOMY  10/27/2015    SURGERY VAS DEFERENS; MANAGED BY: Liyah Andre; LAST ASSESSED: 10/27/15     Family History   Problem Relation Age of Onset   • Coronary artery disease Mother    • Heart disease Mother    • Hypertension Mother      Social History     Socioeconomic History   • Marital status: /Civil Union     Spouse name: None   • Number of children: 2   • Years of education: None   • Highest education level: None   Occupational History   • Occupation: TRAVEL    Tobacco Use   • Smoking status: Never   • Smokeless tobacco: Never   Vaping Use   • Vaping Use: Never used   Substance and Sexual Activity   • Alcohol use:  Yes     Alcohol/week: 3 0 standard drinks     Types: 3 Cans of beer per week     Comment: BEING A SOCIAL DRINKER: <= 1 DRINK/WEEK   • Drug use: Never   • Sexual activity: Yes     Partners: Female     Birth control/protection: Male Sterilization   Other Topics Concern   • None   Social History Narrative    NO DAILY COFFEE CONSUMPTION (___CUPS/DAY)    NO DAILY COLA CONSUMPTION (___CANS/DAY)    DAILY TEA CONSUMPTION (___CUPS/DAY)     Social Determinants of Health     Financial Resource Strain: Not on file   Food Insecurity: Not on file   Transportation Needs: Not on file   Physical Activity: Not on file   Stress: Not on file   Social Connections: Not on file   Intimate Partner Violence: Not on file   Housing Stability: Not on file     Current Outpatient Medications on File Prior to Visit   Medication Sig   • aspirin 81 mg chewable tablet Chew 1 tablet daily   • Evolocumab (Repatha) 140 MG/ML SOSY Inject 1 mL (140 mg total) under the skin every 14 (fourteen) days   • metoprolol succinate (TOPROL-XL) 25 mg 24 hr tablet Take 1 tablet (25 mg total) by mouth daily   • Multiple Vitamins-Minerals (MULTIVITAMIN WITH MINERALS) tablet Take 2 tablets by mouth daily   • rosuvastatin (CRESTOR) 20 MG tablet Take 1 tablet (20 mg total) by mouth daily   • sildenafil (VIAGRA) 100 mg tablet Take 1 tablet (100 mg total) by mouth daily as needed for erectile dysfunction   • zolpidem (AMBIEN) 10 mg tablet Take 1 tablet (10 mg total) by mouth daily at bedtime as needed for sleep     No Known Allergies  Immunization History   Administered Date(s) Administered   • COVID-19 PFIZER VACCINE 0 3 ML IM 02/25/2021, 03/18/2021, 11/11/2021   • Fluzone Split Quad 0 5 mL 10/28/2015, 11/04/2016   • INFLUENZA 11/08/2014, 10/28/2015, 11/04/2016, 10/04/2018, 09/30/2022   • Influenza Injectable, MDCK, Preservative Free, Quadrivalent, 0 5 mL 11/25/2019, 10/15/2020   • Influenza Quadrivalent Preservative Free 3 years and older IM 10/28/2015, 11/04/2016   • Influenza Quadrivalent, 6-35 Months IM 09/18/2017   • Influenza, "recombinant, quadrivalent,injectable, preservative free 09/30/2022       Objective     /70 (BP Location: Left arm, Patient Position: Sitting, Cuff Size: Adult)   Pulse 60   Temp (!) 97 2 °F (36 2 °C)   Ht 6' 2\" (1 88 m)   Wt 116 kg (255 lb)   SpO2 98%   BMI 32 74 kg/m²     Physical Exam  Vitals reviewed  Constitutional:       Appearance: He is well-developed  HENT:      Head: Normocephalic and atraumatic  Right Ear: Tympanic membrane, ear canal and external ear normal       Left Ear: Tympanic membrane, ear canal and external ear normal       Mouth/Throat:      Mouth: Mucous membranes are moist    Eyes:      General: No scleral icterus  Extraocular Movements: Extraocular movements intact  Conjunctiva/sclera: Conjunctivae normal       Pupils: Pupils are equal, round, and reactive to light  Neck:      Thyroid: No thyromegaly  Vascular: No carotid bruit  Cardiovascular:      Rate and Rhythm: Normal rate and regular rhythm  Heart sounds: Normal heart sounds  No murmur heard  Pulmonary:      Effort: Pulmonary effort is normal       Breath sounds: Normal breath sounds  Abdominal:      General: Bowel sounds are normal  There is no distension  Palpations: Abdomen is soft  There is no mass  Tenderness: There is no abdominal tenderness  Musculoskeletal:         General: No swelling, tenderness or deformity  Normal range of motion  Cervical back: Normal range of motion and neck supple  No tenderness  No muscular tenderness  Right lower leg: No edema  Left lower leg: No edema  Lymphadenopathy:      Cervical: No cervical adenopathy  Skin:     General: Skin is warm and dry  Capillary Refill: Capillary refill takes less than 2 seconds  Neurological:      Mental Status: He is alert and oriented to person, place, and time  Cranial Nerves: Cranial nerve deficit (sl decreased vision on the L) present  Sensory: No sensory deficit        Motor: " No weakness  Coordination: Coordination normal       Gait: Gait normal       Deep Tendon Reflexes: Reflexes normal    Psychiatric:         Mood and Affect: Mood normal          Behavior: Behavior normal          Thought Content:  Thought content normal          Judgment: Judgment normal        Gagandeep Winston MD

## 2023-05-09 DIAGNOSIS — F51.01 PRIMARY INSOMNIA: ICD-10-CM

## 2023-05-10 RX ORDER — ZOLPIDEM TARTRATE 10 MG/1
10 TABLET ORAL
Qty: 30 TABLET | Refills: 0 | Status: SHIPPED | OUTPATIENT
Start: 2023-05-10

## 2023-05-10 NOTE — TELEPHONE ENCOUNTER
1  [id number] 04/11/2023 04/07/2023 Zolpidem Tartrate (Tablet)  30 0 30 10 MG NA [facility]  [employer] [employer]  [employer] 0 / 0 [place]    1  [id number] 03/13/2023 03/13/2023 Zolpidem Tartrate (Tablet)  30 0 30 10 MG NA MINGO SHEA POGODZINSKI  [employer]  [employer] 0 / 0 [place]    1  [id number] 02/10/2023  02/06/2023 Zolpidem Tartrate (Tablet)  30 0 30 10 MG NA MICHAEL POSADAS  [employer]  [employer] 0 / 0 [place]    1  [id number] 01/12/2023 01/12/2023 Zolpidem Tartrate (Tablet)  30 0 30 10 MG NA MINGO SHEA POGODZINSKI  [employer]  [employer] 0 / 0 [place]    1  [id number] 12/14/2022 12/13/2022 Zolpidem Tartrate (Tablet)  30 0 30 10 MG NA MINGO SHEA POGODZINSKI  [employer]  [employer] 0 / 0 [place]    1  [id number] 11/14/2022 09/14/2022 Zolpidem Tartrate (Tablet)  30 0 30 10 MG NA MINGO SHEA POGODZINSKI  [employer]  [employer] 2 / 2 [place]    1  [id number] 10/16/2022  09/14/2022 Zolpidem Tartrate (Tablet)  30 0 30 10 MG NA MINGO SHEA POGODZINSKI  [employer]  [employer] 1 / 2 [place]    1  [id number] 09/14/2022 09/14/2022 Zolpidem Tartrate (Tablet)  30 0 30 10 MG NA MINGO SHEA POGODZINSKI  [employer]  [employer] 0 / 2 [place]    1  [id number] 08/15/2022  06/02/2022 Zolpidem Tartrate (Tablet)  30 0 30 10 MG NA MINGO SHEA POGODZINSKI  [employer] [employer]  [employer] 2 / 2 [place]    1  [id number] 07/10/2022  06/02/2022 Zolpidem Tartrate (Tablet)  30 0 30 10 MG NA MINGO SHEA, GREG  [employer]    [employer] 1 / 2 [place]

## 2023-05-11 ENCOUNTER — TELEPHONE (OUTPATIENT)
Dept: CARDIOLOGY CLINIC | Facility: CLINIC | Age: 53
End: 2023-05-11

## 2023-05-12 DIAGNOSIS — Z95.1 S/P CABG X 4: Primary | ICD-10-CM

## 2023-05-12 DIAGNOSIS — E78.2 MIXED HYPERLIPIDEMIA: ICD-10-CM

## 2023-05-12 NOTE — PROGRESS NOTES
For approval of evolocumab insurance requests a coronary calcium score in this 54yo with prior CABG. Ordered.

## 2023-05-26 ENCOUNTER — APPOINTMENT (OUTPATIENT)
Dept: LAB | Facility: CLINIC | Age: 53
End: 2023-05-26

## 2023-05-26 DIAGNOSIS — I25.10 ARTERIOSCLEROTIC CARDIOVASCULAR DISEASE: ICD-10-CM

## 2023-05-26 LAB
ALBUMIN SERPL BCP-MCNC: 4.6 G/DL (ref 3.5–5)
ALP SERPL-CCNC: 45 U/L (ref 34–104)
ALT SERPL W P-5'-P-CCNC: 17 U/L (ref 7–52)
ANION GAP SERPL CALCULATED.3IONS-SCNC: 5 MMOL/L (ref 4–13)
AST SERPL W P-5'-P-CCNC: 21 U/L (ref 13–39)
BASOPHILS # BLD AUTO: 0.02 THOUSANDS/ÂΜL (ref 0–0.1)
BASOPHILS NFR BLD AUTO: 0 % (ref 0–1)
BILIRUB SERPL-MCNC: 0.64 MG/DL (ref 0.2–1)
BUN SERPL-MCNC: 16 MG/DL (ref 5–25)
CALCIUM SERPL-MCNC: 9.3 MG/DL (ref 8.4–10.2)
CHLORIDE SERPL-SCNC: 105 MMOL/L (ref 96–108)
CHOLEST SERPL-MCNC: 134 MG/DL
CO2 SERPL-SCNC: 28 MMOL/L (ref 21–32)
CREAT SERPL-MCNC: 0.83 MG/DL (ref 0.6–1.3)
EOSINOPHIL # BLD AUTO: 0.14 THOUSAND/ÂΜL (ref 0–0.61)
EOSINOPHIL NFR BLD AUTO: 3 % (ref 0–6)
ERYTHROCYTE [DISTWIDTH] IN BLOOD BY AUTOMATED COUNT: 12.3 % (ref 11.6–15.1)
GFR SERPL CREATININE-BSD FRML MDRD: 100 ML/MIN/1.73SQ M
GLUCOSE P FAST SERPL-MCNC: 93 MG/DL (ref 65–99)
HCT VFR BLD AUTO: 43.5 % (ref 36.5–49.3)
HDLC SERPL-MCNC: 42 MG/DL
HGB BLD-MCNC: 14.6 G/DL (ref 12–17)
IMM GRANULOCYTES # BLD AUTO: 0 THOUSAND/UL (ref 0–0.2)
IMM GRANULOCYTES NFR BLD AUTO: 0 % (ref 0–2)
LDLC SERPL CALC-MCNC: 78 MG/DL (ref 0–100)
LYMPHOCYTES # BLD AUTO: 1.39 THOUSANDS/ÂΜL (ref 0.6–4.47)
LYMPHOCYTES NFR BLD AUTO: 30 % (ref 14–44)
MCH RBC QN AUTO: 30.8 PG (ref 26.8–34.3)
MCHC RBC AUTO-ENTMCNC: 33.6 G/DL (ref 31.4–37.4)
MCV RBC AUTO: 92 FL (ref 82–98)
MONOCYTES # BLD AUTO: 0.46 THOUSAND/ÂΜL (ref 0.17–1.22)
MONOCYTES NFR BLD AUTO: 10 % (ref 4–12)
NEUTROPHILS # BLD AUTO: 2.66 THOUSANDS/ÂΜL (ref 1.85–7.62)
NEUTS SEG NFR BLD AUTO: 57 % (ref 43–75)
NONHDLC SERPL-MCNC: 92 MG/DL
NRBC BLD AUTO-RTO: 0 /100 WBCS
PLATELET # BLD AUTO: 137 THOUSANDS/UL (ref 149–390)
PMV BLD AUTO: 11.7 FL (ref 8.9–12.7)
POTASSIUM SERPL-SCNC: 4.2 MMOL/L (ref 3.5–5.3)
PROT SERPL-MCNC: 7.4 G/DL (ref 6.4–8.4)
RBC # BLD AUTO: 4.74 MILLION/UL (ref 3.88–5.62)
SODIUM SERPL-SCNC: 138 MMOL/L (ref 135–147)
TRIGL SERPL-MCNC: 71 MG/DL
WBC # BLD AUTO: 4.67 THOUSAND/UL (ref 4.31–10.16)

## 2023-06-06 DIAGNOSIS — F51.01 PRIMARY INSOMNIA: ICD-10-CM

## 2023-06-06 RX ORDER — ZOLPIDEM TARTRATE 10 MG/1
10 TABLET ORAL
Qty: 30 TABLET | Refills: 1 | Status: SHIPPED | OUTPATIENT
Start: 2023-06-06

## 2023-06-06 NOTE — TELEPHONE ENCOUNTER
1 [id number] 05/10/2023 05/10/2023 Zolpidem Tartrate (Tablet) 30 0 30 10 MG NA MINGO SHEA POGODZINSKI [employer]  [employer] 0 / 0 [place]    1 [id number] 04/11/2023 04/07/2023 Zolpidem Tartrate (Tablet) 30 0 30 10 MG [facility] [employer] [facility]  [employer] 0 / 0 [place]    1 [id number] 03/13/2023 03/13/2023 Zolpidem Tartrate (Tablet) 30 0 30 10 MG NA MINGO SHEA POGODZINSKI [employer]   [employer] 0 / 0

## 2023-06-08 ENCOUNTER — TELEPHONE (OUTPATIENT)
Dept: FAMILY MEDICINE CLINIC | Facility: CLINIC | Age: 53
End: 2023-06-08

## 2023-06-08 DIAGNOSIS — F51.01 PRIMARY INSOMNIA: ICD-10-CM

## 2023-06-08 RX ORDER — ZOLPIDEM TARTRATE 10 MG/1
10 TABLET ORAL
Qty: 15 TABLET | Refills: 0 | Status: SHIPPED | OUTPATIENT
Start: 2023-06-08 | End: 2023-08-23 | Stop reason: SDUPTHER

## 2023-06-08 NOTE — TELEPHONE ENCOUNTER
Received via outlook-    Hello, this is Fran Patel  My phone number is [phone]  I'm a patient of Doctor Aubrey calling in regards to a need of a short supply on one of my medications, Zolpidem Tartrate  Your office has already processed the refill and [facility] has sent it but won't receive it until the 15th of the month  I'm leaving this weekend for [place] for two weeks so was hoping to  a two week supply of its Zolpidem tartrate 10 milligrams to be that, if I could pick that up at the [facility] on [address] [facility], Fran Patel, [phone]  Thank you very much

## 2023-07-24 DIAGNOSIS — F51.01 PRIMARY INSOMNIA: ICD-10-CM

## 2023-07-24 RX ORDER — ZOLPIDEM TARTRATE 10 MG/1
10 TABLET ORAL
Qty: 30 TABLET | Refills: 0 | Status: SHIPPED | OUTPATIENT
Start: 2023-07-24

## 2023-07-24 NOTE — TELEPHONE ENCOUNTER
1 [id number] 06/09/2023 06/06/2023 Zolpidem Tartrate (Tablet) 30.0 30 10 MG NA MINGO SHEA POGODZINSKI [facility] [employer] 0 / 1 [place]    1 [id number] 06/08/2023 06/08/2023 Zolpidem Tartrate (Tablet) 15.0 15 10 MG MINGO THURMAN POGODZINSKI [facility] [employer] 0 / 0 [place]    1 [id number] 05/10/2023 05/10/2023 Zolpidem Tartrate (Tablet) 30.0 30 10 MG MINGO THURMAN POGODZINSKI [facility] 0 / 0

## 2023-08-21 DIAGNOSIS — F51.01 PRIMARY INSOMNIA: ICD-10-CM

## 2023-08-22 NOTE — TELEPHONE ENCOUNTER
[id number] 07/24/2023 07/24/2023 Zolpidem Tartrate (Tablet) 30.0 30 10 MG NA JESSIE WALLACE [facility] [employer] [employer] [employer] 0 / 0 [place]     1 [id number] 06/09/2023 06/06/2023 Zolpidem Tartrate (Tablet) 30.0 30 10 MG NA MINGO SHEA POGODZINSKI [facility] [employer] 0 / 1 [place]    1 [id number] 06/08/2023 06/08/2023 Zolpidem Tartrate (Tablet) 15.0 15 10 MG NA MINGO SHEA POGODZINSKI [facility] [employer] 0 / 0

## 2023-08-23 DIAGNOSIS — F51.01 PRIMARY INSOMNIA: ICD-10-CM

## 2023-08-23 NOTE — TELEPHONE ENCOUNTER
1 [id number] 07/24/2023 07/24/2023 Zolpidem Tartrate (Tablet) 30.0 30 10 MG NA JESSIE WALLACE [facility] [employer] [employer] 0 / 0 [place]    1 [id number] 06/09/2023 06/06/2023 Zolpidem Tartrate (Tablet) 30.0 30 10 MG NA MINGO SHEA POGODZINSKI [facility] [employer] 0 / 1 [place]    1 [id number] 06/08/2023 06/08/2023 Zolpidem Tartrate (Tablet) 15.0 15 10 MG NA MINGO SHEA POG[facility]ZINSKI [facility], [facility] 0 / 0

## 2023-08-24 RX ORDER — ZOLPIDEM TARTRATE 10 MG/1
10 TABLET ORAL
Qty: 30 TABLET | Refills: 0 | Status: SHIPPED | OUTPATIENT
Start: 2023-08-24

## 2023-08-24 RX ORDER — ZOLPIDEM TARTRATE 10 MG/1
10 TABLET ORAL
Qty: 15 TABLET | Refills: 0 | Status: SHIPPED | OUTPATIENT
Start: 2023-08-24 | End: 2023-09-08

## 2023-09-21 DIAGNOSIS — I10 ESSENTIAL HYPERTENSION: ICD-10-CM

## 2023-09-21 DIAGNOSIS — F51.01 PRIMARY INSOMNIA: ICD-10-CM

## 2023-09-21 RX ORDER — ZOLPIDEM TARTRATE 10 MG/1
10 TABLET ORAL
Qty: 30 TABLET | Refills: 0 | Status: SHIPPED | OUTPATIENT
Start: 2023-09-21

## 2023-09-21 RX ORDER — METOPROLOL SUCCINATE 25 MG/1
25 TABLET, EXTENDED RELEASE ORAL DAILY
Qty: 90 TABLET | Refills: 3 | Status: SHIPPED | OUTPATIENT
Start: 2023-09-21

## 2023-09-21 NOTE — TELEPHONE ENCOUNTER
Pt leaving for weekend and would like refill before then    Reason for call:   [x] Refill   [] Prior Auth  [] Other:     Office:   [x] [address]  [] Speciality/Provider -     Medication: zolpidem    Dose/Frequency: 10mg qd hs prn    Quantity: 30    Pharmacy:   [address], [address] [place] [address]    Does the patient have enough for 3 days?    [x] Yes   [] No - Send as HP to POD The patient has been examined and the H&P has been reviewed:    I concur with the findings and no changes have occurred since H&P was written.   This patient has been cleared for surgery in an ambulatory surgical facility    ASA 3,  MP3  No history of anesthetic complications  Plan for RN IV sedation      Anesthesia/Surgery risks, benefits and alternative options discussed and understood by patient/family.          Active Hospital Problems    Diagnosis  POA    Lumbar radiculitis [M54.16]  Yes      Resolved Hospital Problems    Diagnosis Date Resolved POA   No resolved problems to display.

## 2023-10-09 ENCOUNTER — OFFICE VISIT (OUTPATIENT)
Dept: FAMILY MEDICINE CLINIC | Facility: CLINIC | Age: 53
End: 2023-10-09
Payer: COMMERCIAL

## 2023-10-09 VITALS
OXYGEN SATURATION: 98 % | TEMPERATURE: 97.4 F | HEART RATE: 61 BPM | BODY MASS INDEX: 30.8 KG/M2 | DIASTOLIC BLOOD PRESSURE: 74 MMHG | WEIGHT: 240 LBS | HEIGHT: 74 IN | SYSTOLIC BLOOD PRESSURE: 110 MMHG

## 2023-10-09 DIAGNOSIS — Z00.00 ANNUAL PHYSICAL EXAM: Primary | ICD-10-CM

## 2023-10-09 DIAGNOSIS — I10 HYPERTENSION, UNSPECIFIED TYPE: ICD-10-CM

## 2023-10-09 DIAGNOSIS — R22.42 FOOT MASS, LEFT: ICD-10-CM

## 2023-10-09 DIAGNOSIS — F51.01 PRIMARY INSOMNIA: ICD-10-CM

## 2023-10-09 DIAGNOSIS — I25.10 ARTERIOSCLEROTIC CARDIOVASCULAR DISEASE: ICD-10-CM

## 2023-10-09 DIAGNOSIS — Z23 IMMUNIZATION DUE: ICD-10-CM

## 2023-10-09 PROBLEM — F32.4 MAJOR DEPRESSIVE DISORDER WITH SINGLE EPISODE, IN PARTIAL REMISSION (HCC): Status: ACTIVE | Noted: 2021-02-21

## 2023-10-09 PROCEDURE — 90471 IMMUNIZATION ADMIN: CPT | Performed by: FAMILY MEDICINE

## 2023-10-09 PROCEDURE — 99214 OFFICE O/P EST MOD 30 MIN: CPT | Performed by: FAMILY MEDICINE

## 2023-10-09 PROCEDURE — 90686 IIV4 VACC NO PRSV 0.5 ML IM: CPT | Performed by: FAMILY MEDICINE

## 2023-10-09 PROCEDURE — 99396 PREV VISIT EST AGE 40-64: CPT | Performed by: FAMILY MEDICINE

## 2023-10-09 NOTE — PROGRESS NOTES
ADULT ANNUAL [address] [facility] JAZLYN    NAME: Diane España  AGE: 48 y.o. SEX: male  : 1970     DATE: 10/9/2023     Assessment and Plan:     Problem List Items Addressed This Visit        Cardiovascular and Mediastinum    Arteriosclerotic cardiovascular disease     Asymptomatic. LDL at goal. Continue present care. F/u with Cardio. Recheck 6m         Hypertension     Well controlled. Cont present treatment. Monitor labs. Recheck 6m              Other    Foot mass, left     I reviewed with pt. Pt with "rocker" abnormality, possibly indicating a Charcot joint? Will check XR. Consider foot surgery eval.          Relevant Orders    XR foot 3+ vw left    Insomnia     Improved with weight loss. Continue to monitor        Other Visit Diagnoses     Annual physical exam    -  Primary    Immunization due        Relevant Orders    FLUZONE: influenza vaccine, quadrivalent, 0.5 mL (Completed)          Immunizations and preventive care screenings were discussed with patient today. Appropriate education was printed on patient's after visit summary. Counseling:  Exercise: the importance of regular exercise/physical activity was discussed. Recommend exercise 3-5 times per week for at least 30 minutes. Return in about 6 months (around 2024). Chief Complaint:     Chief Complaint   Patient presents with   • Annual Exam      History of Present Illness:     Adult Annual Physical   Patient here for a comprehensive physical exam. The patient reports problems - as below. - pt up to date with Cardio. Still plays tennis 3x a week and hiking. Denies CP, palpitations, lightheadedness or other CV symptoms with or without exertion  - pt working on diet and has lost 20lbs or so. Started gaining some back  - has a growth on his L foot he would like checked. Diet and Physical Activity  Diet/Nutrition: well balanced diet.    Exercise: moderate cardiovascular exercise, 3-4 times a week on average and 1-2 hours on average. Depression Screening  PHQ-2/9 Depression Screening    Little interest or pleasure in doing things: 0 - not at all  Feeling down, depressed, or hopeless: 0 - not at all  Trouble falling or staying asleep, or sleeping too much: 0 - not at all  Feeling tired or having little energy: 0 - not at all  Poor appetite or overeatin - not at all  Feeling bad about yourself - or that you are a failure or have let yourself or your family down: 0 - not at all  Trouble concentrating on things, such as reading the newspaper or watching television: 0 - not at all  Moving or speaking so slowly that other people could have noticed. Or the opposite - being so fidgety or restless that you have been moving around a lot more than usual: 0 - not at all  Thoughts that you would be better off dead, or of hurting yourself in some way: 0 - not at all  PHQ-9 Score: 0   PHQ-9 Interpretation: No or Minimal depression        General Health  Sleep: sleeps well and improved with weight loss. Hearing: normal - bilateral.  Vision: most recent eye exam <1 year ago and wears glasses. Dental: no dental visits for >1 year and brushes teeth twice daily.  Health  Symptoms include: none     Review of Systems:     Review of Systems   Constitutional: Negative. HENT: Negative. Eyes: Negative. Respiratory: Negative. Cardiovascular: Negative. Gastrointestinal: Negative. Endocrine: Negative. Genitourinary: Negative. Musculoskeletal: Negative for arthralgias, back pain, gait problem and joint swelling. Painless L arch mass   Skin: Negative. Allergic/Immunologic: Negative. Neurological: Negative. Hematological: Negative. Psychiatric/Behavioral: Negative.        Past Medical History:     Past Medical History:   Diagnosis Date   • Acute angina ([address])    • Allergic    • High blood pressure     NOT HYPERTENSION   • Irregular heartbeat    • ST elevation (STEMI) myocardial infarction of unspecified site [facility])    • Valvular heart disease       Past Surgical History:     Past Surgical History:   Procedure Laterality Date   • CARDIAC SURGERY      OPEN HEART QUAD BYPASS; RESOLVED: 2012   • CORONARY ANGIOPLASTY  02/21/2012    CORONARY ANGIOGRAPHY WITHOUT CONCOMITANT LEFT HEART CATHETERIZATION; 90% PROXIMAL LAD, 90% 1ST DIAG OSTIUM AND 95% PROXIMAL THIRD, 99% PROXIMAL RAMUS INTERMEDIUS   • CORONARY ANGIOPLASTY WITH STENT PLACEMENT  02/21/2012    4 VESSEL DISEASE TO HAVE CABG; MANAGED BY: SHABBIR SNYDER   • CORONARY ARTERY BYPASS GRAFT      LAST ASSESSED: 11/17/17   • CORONARY ARTERY BYPASS GRAFT  02/29/2012    CABG X4 (SVG TO 1ST DIAGNOL, SVG TO 3RD OBTUSE MARGINAL, FREE RADIAL ARTERY TO RAMUS INTERMEDIUS, SULLIVAN TO LAD) BY DR. Stacie Bourne 2012    • VASECTOMY  10/27/2015    SURGERY VAS DEFERENS; MANAGED BY: Verónica Wiseman; LAST ASSESSED: 10/27/15      Family History:     Family History   Problem Relation Age of Onset   • Coronary artery disease Mother    • Heart disease Mother    • Hypertension Mother       Social History:     Social History     Socioeconomic History   • Marital status: /Civil Union     Spouse name: None   • Number of children: 2   • Years of education: None   • Highest education level: None   Occupational History   • Occupation: TRAVEL    Tobacco Use   • Smoking status: Never   • Smokeless tobacco: Never   Vaping Use   • Vaping Use: Never used   Substance and Sexual Activity   • Alcohol use:  Yes     Alcohol/week: 3.0 standard drinks of alcohol     Types: 3 Cans of beer per week     Comment: BEING A SOCIAL DRINKER: <= 1 DRINK/WEEK   • Drug use: Never   • Sexual activity: Yes     Partners: Female     Birth control/protection: Male Sterilization   Other Topics Concern   • None   Social History Narrative    NO DAILY COFFEE CONSUMPTION (___CUPS/DAY)    NO DAILY COLA CONSUMPTION (___CANS/DAY)    DAILY TEA CONSUMPTION (___CUPS/DAY)     Social Determinants of Health     Financial Resource Strain: Not on file   Food Insecurity: Not on file   Transportation Needs: Not on file   Physical Activity: Not on file   Stress: Not on file   Social Connections: Not on file   Intimate Partner Violence: Not on file   Housing Stability: Not on file      Current Medications:     Current Outpatient Medications   Medication Sig Dispense Refill   • aspirin 81 mg chewable tablet Chew 1 tablet daily     • metoprolol succinate (TOPROL-XL) 25 mg 24 hr tablet TAKE 1 TABLET DAILY 90 tablet 3   • Multiple Vitamins-Minerals (MULTIVITAMIN WITH MINERALS) tablet Take 2 tablets by mouth daily     • rosuvastatin (CRESTOR) 20 MG tablet Take 1 tablet (20 mg total) by mouth daily 90 tablet 3   • sildenafil (VIAGRA) 100 mg tablet Take 1 tablet (100 mg total) by mouth daily as needed for erectile dysfunction 30 tablet 0   • zolpidem (AMBIEN) 10 mg tablet Take 1 tablet (10 mg total) by mouth daily at bedtime as needed for sleep 30 tablet 0     No current facility-administered medications for this visit. Allergies:     No Known Allergies   Physical Exam:     /74 (BP Location: Left arm, Patient Position: Sitting, Cuff Size: Adult)   Pulse 61   Temp (!) 97.4 °F (36.3 °C)   Ht 6' 2" (1.88 m)   Wt 109 kg (240 lb)   SpO2 98%   BMI 30.81 kg/m²     Physical Exam  Vitals reviewed. Constitutional:       Appearance: He is well-developed. HENT:      Head: Normocephalic and atraumatic. Right Ear: Tympanic membrane, ear canal and external ear normal.      Left Ear: Tympanic membrane, ear canal and external ear normal.      Mouth/Throat:      Mouth: Mucous membranes are moist.   Eyes:      Extraocular Movements: Extraocular movements intact. Conjunctiva/sclera: Conjunctivae normal.      Pupils: Pupils are equal, round, and reactive to light. Neck:      Thyroid: No thyromegaly. Vascular: No JVD.    Cardiovascular:      Rate and Rhythm: Normal rate and regular rhythm. Pulses: Normal pulses. Heart sounds: Normal heart sounds. No murmur heard. Pulmonary:      Effort: Pulmonary effort is normal. No respiratory distress. Breath sounds: Normal breath sounds. No wheezing or rales. Abdominal:      General: Bowel sounds are normal. There is no distension. Palpations: Abdomen is soft. There is no mass. Tenderness: There is no abdominal tenderness. Musculoskeletal:         General: No swelling, tenderness or deformity (hard, nonmobile "rocker" abnormalit on the L arch. Rest WNL). Normal range of motion. Cervical back: Normal range of motion and neck supple. No muscular tenderness. Right lower leg: No edema. Left lower leg: No edema. Lymphadenopathy:      Cervical: No cervical adenopathy. Skin:     General: Skin is warm. Capillary Refill: Capillary refill takes less than 2 seconds. Neurological:      Mental Status: He is alert and oriented to person, place, and time. Cranial Nerves: No cranial nerve deficit. Sensory: No sensory deficit. Motor: No weakness or abnormal muscle tone. Coordination: Coordination normal.      Gait: Gait normal.      Deep Tendon Reflexes: Reflexes normal.   Psychiatric:         Mood and Affect: Mood normal.         Behavior: Behavior normal.         Thought Content:  Thought content normal.         Judgment: Judgment normal.      Comments: PHQ-2/9 Depression Screening    Little interest or pleasure in doing things: 0 - not at all  Feeling down, depressed, or hopeless: 0 - not at all  Trouble falling or staying asleep, or sleeping too much: 0 - not at all  Feeling tired or having little energy: 0 - not at all  Poor appetite or overeatin - not at all  Feeling bad about yourself - or that you are a failure or have let   yourself or your family down: 0 - not at all  Trouble concentrating on things, such as reading the newspaper or watching television: 0 - not at all  Moving or speaking so slowly that other people could have noticed.  Or the   opposite - being so fidgety or restless that you have been moving around a   lot more than usual: 0 - not at all  Thoughts that you would be better off dead, or of hurting yourself in some   way: 0 - not at all  PHQ-9 Score: 0   PHQ-9 Interpretation: No or Minimal depression           Mckinley King MD  [address]

## 2023-10-09 NOTE — ASSESSMENT & PLAN NOTE
I reviewed with pt. Pt with "rocker" abnormality, possibly indicating a Charcot joint? Will check XR.  Consider foot surgery eval.

## 2023-10-19 DIAGNOSIS — F51.01 PRIMARY INSOMNIA: ICD-10-CM

## 2023-10-19 NOTE — TELEPHONE ENCOUNTER
1 [id number] 09/22/2023 09/21/2023 Zolpidem Tartrate (Tablet) 30.0 30 10 MG NA MINGO SHEA POGODZINSKI [employer] 0 / 0 [place]    1 [id number] 08/24/2023 08/24/2023 Zolpidem Tartrate (Tablet) 30.0 30 10 MG NA MINGO SHEA POGODZINSKI [facility], [facility] Us 0 / 0 [place] [address] [id number] 07/24/2023 07/24/2023 Zolpidem Tartrate (Tablet) 30.0 30 10 MG NA JESSIE WALLACE [employer] [facility] 0 / 0 [place] [address] [id number] 06/09/2023 06/06/2023 Zolpidem Tartrate (Tablet) 30.0 30 10 MG NA MINGO SHEA POGODZINSKI [facility] Us 0 / 1 [place] [address] [id number] 06/08/2023 06/08/2023 Zolpidem Tartrate (Tablet) 15.0 15 10 MG NA MINGO SHEA POGODZINSKI [employer] 0 / 0 [place]    1 [id number] 05/10/2023 05/10/2023 Zolpidem Tartrate (Tablet) 30.0 30 10 MG NA MINGO SHEA POGODZINSKI [employer] [facility] 0 / 0 [place] [address] [id number] 04/11/2023 04/07/2023 Zolpidem Tartrate (Tablet) 30.0 30 10 MG NA [facility] 0 / 0 [place] [address] [id number] 03/13/2023 03/13/2023 Zolpidem Tartrate (Tablet) 30.0 30 10 MG NA MINGO SHEA POGODZINSKI [employer] [employer] [facility] 0 / 0 [place]    1 [id number] 02/10/2023 02/06/2023 Zolpidem Tartrate (Tablet) 30.0 30 10 MG NA MICHAEL POSADAS [facility] DR[facility], INCJermaine [facility] [employer] Us 0 / 0 [place]    1 [id number] 01/12/2023 01/12/2023 Zolpidem Tartrate (Tablet) 30.0 30 10 MG NA MINGO SHEA POGODZINSKI [facility], INCJermaine [facility] 0 / 0 [place]

## 2023-10-20 ENCOUNTER — HOSPITAL ENCOUNTER (OUTPATIENT)
Dept: RADIOLOGY | Facility: HOSPITAL | Age: 53
Discharge: HOME | End: 2023-10-20
Payer: COMMERCIAL

## 2023-10-20 DIAGNOSIS — R22.42 FOOT MASS, LEFT: ICD-10-CM

## 2023-10-20 PROCEDURE — 73630 X-RAY EXAM OF FOOT: CPT

## 2023-10-20 RX ORDER — ZOLPIDEM TARTRATE 10 MG/1
10 TABLET ORAL
Qty: 30 TABLET | Refills: 0 | Status: SHIPPED | OUTPATIENT
Start: 2023-10-20

## 2023-11-20 DIAGNOSIS — F51.01 PRIMARY INSOMNIA: ICD-10-CM

## 2023-11-21 ENCOUNTER — TELEPHONE (OUTPATIENT)
Age: 53
End: 2023-11-21

## 2023-11-21 NOTE — TELEPHONE ENCOUNTER
Reason for call:   [] Refill   [] Prior Auth  [x] Other:     Pt called to check the status of his zolpidem refill send in a sep enc.     Pt would also like to know the plan moving forward for the mass on his L foot, says he had the xray done and the clinical staff contacted him but he need to know what is the next step as the xray did not indicate anything about the mass        Cb: [phone]

## 2023-11-21 NOTE — TELEPHONE ENCOUNTER
1 [id number] 10/20/2023 10/20/2023 Zolpidem Tartrate (Tablet) 30.0 30 10 MG NA JESSIE [facility] [facility] [facility] [employer] 0 / 0 [place]    1 [id number] 09/22/2023 09/21/2023 Zolpidem Tartrate (Tablet) 30.0 30 10 MG NA MINGO SHEA POGODZINSKI [facility] [employer] 0 / 0 [place]    1 [id number] 08/24/2023 08/24/2023 Zolpidem Tartrate (Tablet) 30.0 30 10 MG NA MINGO SHEA POGODZINSKI [facility] [employer] 0 / 0 [place]    1 [id number] 07/24/2023 07/24/2023 Zolpidem Tartrate (Tablet) 30.0 30 10 MG NA JESSIE [facility] [employer] 0 / 0 [place]    1 [id number] 06/09/2023 06/06/2023 Zolpidem Tartrate (Tablet) 30.0 30 10 MG NA MINGO SHEA POGODZINSKI [facility] Us 0 / 1 [place]    1 [id number] 06/08/2023 06/08/2023 Zolpidem Tartrate (Tablet) 15.0 15 10 MG NA MINGO SHEA POGODZINSKI [facility], [employer] [employer] 0 / 0 [place]    1 [id number] 05/10/2023 05/10/2023 Zolpidem Tartrate (Tablet) 30.0 30 10 MG NA MINGO SHEA POGODZINSKI [facility] [employer] 0 / 0 [place]    1 [id number] 04/11/2023 04/07/2023 Zolpidem Tartrate (Tablet) 30.0 30 10 MG NA [facility] [employer] 0 / 0 [place]    1 [id number] 03/13/2023 03/13/2023 Zolpidem Tartrate (Tablet) 30.0 30 10 MG NA MINGO SHEA POGODZINSKI [facility] [employer] 0 / 0 [place]    1 [id number] 02/10/2023 02/06/2023 Zolpidem Tartrate (Tablet) 30.0 30 10 MG NA MICHAEL POSADAS [facility] [employer]  [employer] 0 / 0

## 2023-11-21 NOTE — TELEPHONE ENCOUNTER
Reason for call:   [] Refill   [] Prior Auth  [x] Other:       Pt calling to check on the status of his zolpidem he requested via "my chart" yesterday states that he will be out of zolpidem in 2D and would like to  the medication tomorrow before the holiday.

## 2023-11-22 DIAGNOSIS — R22.42 FOOT MASS, LEFT: Primary | ICD-10-CM

## 2023-11-22 RX ORDER — ZOLPIDEM TARTRATE 10 MG/1
10 TABLET ORAL
Qty: 30 TABLET | Refills: 0 | Status: SHIPPED | OUTPATIENT
Start: 2023-11-22

## 2023-12-15 ENCOUNTER — OFFICE VISIT (OUTPATIENT)
Dept: PODIATRY | Facility: CLINIC | Age: 53
End: 2023-12-15
Payer: COMMERCIAL

## 2023-12-15 VITALS
WEIGHT: 247 LBS | DIASTOLIC BLOOD PRESSURE: 84 MMHG | HEART RATE: 63 BPM | HEIGHT: 74 IN | BODY MASS INDEX: 31.7 KG/M2 | SYSTOLIC BLOOD PRESSURE: 136 MMHG

## 2023-12-15 DIAGNOSIS — R22.42 FOOT MASS, LEFT: ICD-10-CM

## 2023-12-15 PROCEDURE — 99243 OFF/OP CNSLTJ NEW/EST LOW 30: CPT | Performed by: PODIATRIST

## 2023-12-15 NOTE — LETTER
December 19, 2023     Jurgen Mcqueen MD  [address]  [address]  [place] [address]    Patient: Jose Nix   YOB: 1970   Date of Visit: 12/15/2023       Dear Dr. Mcqueen:    Thank you for referring Jose Nix to me for evaluation. Below are my notes for this consultation.    If you have questions, please do not hesitate to call me. I look forward to following your patient along with you.         Sincerely,        Khoa Smith DPM        CC: No Recipients    Khoa Smith DPM  12/15/2023  4:19 PM  Signed  Assessment/Plan:     The patient's clinical examination today is significant for a mildly tender bony exostosis to the plantar aspect of the left midfoot at the area of the first metatarsocuneiform joint.  There does appear to be an associated repetitious bursal sac underneath the area of the bony prominence.  There were no open lesions.  There are no signs of infection.  Palpable pulses are noted bilaterally.    The patient's recent x-rays of the left foot taken in October 2023 were personally reviewed and interpreted.  Findings were significant for arthritic changes of the first metatarsocuneiform joint.  Arthritic changes are also noted to the second cuneiform joint.  No fractures or dislocations identified.    Patient's symptomatology is most consistent with a painful exostosis of the left midfoot with formation of an adventitious bursa.  Treatment options are to continue to monitor the lesion, injection therapy, custom orthotics with a cut out for the bony prominence and bursal sac, or surgical excision.  The patient would like to proceed with surgical excision of the bony prominence and bursal sac.      The perioperative course was discussed with the patient and he is agreeable.  Informed consent was obtained today for exostectomy of the left foot.  We will initiate preoperative testing and scheduling when the patient is ready to proceed.  He does have a  planned vacation trip planned for [place] sometime in February.  I have recommended that he proceed with surgical intervention sometime after he returns from his trip.     Diagnoses and all orders for this visit:    Foot mass, left  -     Ambulatory Referral to Podiatry          Subjective:     Patient ID: Jose Nix is a 53 y.o. male.    The patient presents today for his initial consultation with [facility] podiatry with a chief complaint of a bony bump to the plantar aspect of his left foot that is mildly tender.  The pain does tend to get worse with more intense activities.  He does note some significant tenderness after hiking for about 2 to 3 miles.  Tenderness is also noted when playing tennis.  However with his day-to-day activities, there is minimal pain.      PAST MEDICAL HISTORY:  Past Medical History:   Diagnosis Date   • Acute angina (HCC)    • Allergic    • High blood pressure     NOT HYPERTENSION   • Irregular heartbeat    • ST elevation (STEMI) myocardial infarction of unspecified site (HCC)    • Valvular heart disease        PAST SURGICAL HISTORY:  Past Surgical History:   Procedure Laterality Date   • CARDIAC SURGERY      OPEN HEART QUAD BYPASS; RESOLVED: 2012   • CORONARY ANGIOPLASTY  02/21/2012    CORONARY ANGIOGRAPHY WITHOUT CONCOMITANT LEFT HEART CATHETERIZATION; 90% PROXIMAL LAD, 90% 1ST DIAG OSTIUM AND 95% PROXIMAL THIRD, 99% PROXIMAL RAMUS INTERMEDIUS   • CORONARY ANGIOPLASTY WITH STENT PLACEMENT  02/21/2012    4 VESSEL DISEASE TO HAVE CABG; MANAGED BY: SHABBIR SNYDER   • CORONARY ARTERY BYPASS GRAFT      LAST ASSESSED: 11/17/17   • CORONARY ARTERY BYPASS GRAFT  02/29/2012    CABG X4 (SVG TO 1ST DIAGNOL, SVG TO 3RD OBTUSE MARGINAL, FREE RADIAL ARTERY TO RAMUS INTERMEDIUS, SULLIVAN TO LAD) BY DR. GUTIERREZ 2012    • VASECTOMY  10/27/2015    SURGERY VAS DEFERENS; MANAGED BY: LEILANI HEREDIA; LAST ASSESSED: 10/27/15        ALLERGIES:  Patient has no known  allergies.    MEDICATIONS:  Current Outpatient Medications   Medication Sig Dispense Refill   • aspirin 81 mg chewable tablet Chew 1 tablet daily     • metoprolol succinate (TOPROL-XL) 25 mg 24 hr tablet TAKE 1 TABLET DAILY 90 tablet 3   • Multiple Vitamins-Minerals (MULTIVITAMIN WITH MINERALS) tablet Take 2 tablets by mouth daily     • rosuvastatin (CRESTOR) 20 MG tablet Take 1 tablet (20 mg total) by mouth daily 90 tablet 3   • sildenafil (VIAGRA) 100 mg tablet Take 1 tablet (100 mg total) by mouth daily as needed for erectile dysfunction 30 tablet 0   • zolpidem (AMBIEN) 10 mg tablet Take 1 tablet (10 mg total) by mouth daily at bedtime as needed for sleep 30 tablet 0     No current facility-administered medications for this visit.       SOCIAL HISTORY:  Social History     Socioeconomic History   • Marital status: /Civil Union     Spouse name: None   • Number of children: 2   • Years of education: None   • Highest education level: None   Occupational History   • Occupation: TRAVEL    Tobacco Use   • Smoking status: Never   • Smokeless tobacco: Never   Vaping Use   • Vaping status: Never Used   Substance and Sexual Activity   • Alcohol use: Yes     Alcohol/week: 3.0 standard drinks of alcohol     Types: 3 Cans of beer per week     Comment: BEING A SOCIAL DRINKER: <= 1 DRINK/WEEK   • Drug use: Never   • Sexual activity: Yes     Partners: Female     Birth control/protection: Male Sterilization   Other Topics Concern   • None   Social History Narrative    NO DAILY COFFEE CONSUMPTION (___CUPS/DAY)    NO DAILY COLA CONSUMPTION (___CANS/DAY)    DAILY TEA CONSUMPTION (___CUPS/DAY)     Social Determinants of Health     Financial Resource Strain: Not on file   Food Insecurity: Not on file   Transportation Needs: Not on file   Physical Activity: Not on file   Stress: Not on file   Social Connections: Not on file   Intimate Partner Violence: Not on file   Housing Stability: Not on file        Review  of Systems   Constitutional: Negative.    HENT: Negative.     Eyes: Negative.    Respiratory: Negative.     Cardiovascular: Negative.    Endocrine: Negative.    Musculoskeletal: Negative.    Neurological: Negative.    Hematological: Negative.    Psychiatric/Behavioral: Negative.           Objective:     Physical Exam  Vitals reviewed.   Constitutional:       Appearance: Normal appearance.   HENT:      Head: Normocephalic and atraumatic.      Nose: Nose normal.   Eyes:      Conjunctiva/sclera: Conjunctivae normal.      Pupils: Pupils are equal, round, and reactive to light.   Cardiovascular:      Pulses:           Dorsalis pedis pulses are 2+ on the left side.        Posterior tibial pulses are 2+ on the left side.   Pulmonary:      Effort: Pulmonary effort is normal.   Musculoskeletal:        Feet:    Feet:      Comments: The patient's clinical examination today is significant for a mildly tender bony exostosis to the plantar aspect of the left midfoot at the area of the first metatarsocuneiform joint.  There does appear to be an associated repetitious bursal sac underneath the area of the bony prominence.  There were no open lesions.  There are no signs of infection.  Palpable pulses are noted bilaterally.  Skin:     General: Skin is warm.      Capillary Refill: Capillary refill takes less than 2 seconds.   Neurological:      General: No focal deficit present.      Mental Status: He is alert and oriented to person, place, and time.   Psychiatric:         Mood and Affect: Mood normal.         Behavior: Behavior normal.         Thought Content: Thought content normal.

## 2023-12-15 NOTE — PROGRESS NOTES
Assessment/Plan:     The patient's clinical examination today is significant for a mildly tender bony exostosis to the plantar aspect of the left midfoot at the area of the first metatarsocuneiform joint. There does appear to be an associated repetitious bursal sac underneath the area of the bony prominence. There were no open lesions. There are no signs of infection. Palpable pulses are noted bilaterally. The patient's recent x-rays of the left foot taken in October 2023 were personally reviewed and interpreted. Findings were significant for arthritic changes of the first metatarsocuneiform joint. Arthritic changes are also noted to the second cuneiform joint. No fractures or dislocations identified. Patient's symptomatology is most consistent with a painful exostosis of the left midfoot with formation of an adventitious bursa. Treatment options are to continue to monitor the lesion, injection therapy, custom orthotics with a cut out for the bony prominence and bursal sac, or surgical excision. The patient would like to proceed with surgical excision of the bony prominence and bursal sac. The perioperative course was discussed with the patient and he is agreeable. Informed consent was obtained today for exostectomy of the left foot. We will initiate preoperative testing and scheduling when the patient is ready to proceed. He does have a planned vacation trip planned for [place] sometime in February. I have recommended that he proceed with surgical intervention sometime after he returns from his trip. Diagnoses and all orders for this visit:    Foot mass, left  -     Ambulatory Referral to Podiatry          Subjective:     Patient ID: Shawn Medel is a 48 y.o. male. The patient presents today for his initial consultation with [facility] podiatry with a chief complaint of a bony bump to the plantar aspect of his left foot that is mildly tender.   The pain does tend to get worse with more intense activities. He does note some significant tenderness after hiking for about 2 to 3 miles. Tenderness is also noted when playing tennis. However with his day-to-day activities, there is minimal pain. PAST MEDICAL HISTORY:  Past Medical History:   Diagnosis Date    Acute angina (HCC)     Allergic     High blood pressure     NOT HYPERTENSION    Irregular heartbeat     ST elevation (STEMI) myocardial infarction of unspecified site [facility])     Valvular heart disease        PAST SURGICAL HISTORY:  Past Surgical History:   Procedure Laterality Date    CARDIAC SURGERY      OPEN HEART QUAD BYPASS; RESOLVED: 2012    CORONARY ANGIOPLASTY  02/21/2012    CORONARY ANGIOGRAPHY WITHOUT CONCOMITANT LEFT HEART CATHETERIZATION; 90% PROXIMAL LAD, 90% 1ST DIAG OSTIUM AND 95% PROXIMAL THIRD, 99% PROXIMAL RAMUS INTERMEDIUS    CORONARY ANGIOPLASTY WITH STENT PLACEMENT  02/21/2012    4 VESSEL DISEASE TO HAVE CABG; MANAGED BY: SHABBIR SNYDER    CORONARY ARTERY BYPASS GRAFT      LAST ASSESSED: 11/17/17    CORONARY ARTERY BYPASS GRAFT  02/29/2012    CABG X4 (SVG TO 1ST DIAGNOL, SVG TO 3RD OBTUSE MARGINAL, FREE RADIAL ARTERY TO RAMUS INTERMEDIUS, SULLIVAN TO LAD) BY DR. Francesca Modi 2012     VASECTOMY  10/27/2015    SURGERY VAS DEFERENS; MANAGED BY: Elisha Fuchs; LAST ASSESSED: 10/27/15        ALLERGIES:  Patient has no known allergies.     MEDICATIONS:  Current Outpatient Medications   Medication Sig Dispense Refill    aspirin 81 mg chewable tablet Chew 1 tablet daily      metoprolol succinate (TOPROL-XL) 25 mg 24 hr tablet TAKE 1 TABLET DAILY 90 tablet 3    Multiple Vitamins-Minerals (MULTIVITAMIN WITH MINERALS) tablet Take 2 tablets by mouth daily      rosuvastatin (CRESTOR) 20 MG tablet Take 1 tablet (20 mg total) by mouth daily 90 tablet 3    sildenafil (VIAGRA) 100 mg tablet Take 1 tablet (100 mg total) by mouth daily as needed for erectile dysfunction 30 tablet 0    zolpidem (AMBIEN) 10 mg tablet Take 1 tablet (10 mg total) by mouth daily at bedtime as needed for sleep 30 tablet 0     No current facility-administered medications for this visit. SOCIAL HISTORY:  Social History     Socioeconomic History    Marital status: /Civil Union     Spouse name: None    Number of children: 2    Years of education: None    Highest education level: None   Occupational History    Occupation: TRAVEL    Tobacco Use    Smoking status: Never    Smokeless tobacco: Never   Vaping Use    Vaping status: Never Used   Substance and Sexual Activity    Alcohol use: Yes     Alcohol/week: 3.0 standard drinks of alcohol     Types: 3 Cans of beer per week     Comment: BEING A SOCIAL DRINKER: <= 1 DRINK/WEEK    Drug use: Never    Sexual activity: Yes     Partners: Female     Birth control/protection: Male Sterilization   Other Topics Concern    None   Social History Narrative    NO DAILY COFFEE CONSUMPTION (___CUPS/DAY)    NO DAILY COLA CONSUMPTION (___CANS/DAY)    DAILY TEA CONSUMPTION (___CUPS/DAY)     Social Determinants of Health     Financial Resource Strain: Not on file   Food Insecurity: Not on file   Transportation Needs: Not on file   Physical Activity: Not on file   Stress: Not on file   Social Connections: Not on file   Intimate Partner Violence: Not on file   Housing Stability: Not on file        Review of Systems   Constitutional: Negative. HENT: Negative. Eyes: Negative. Respiratory: Negative. Cardiovascular: Negative. Endocrine: Negative. Musculoskeletal: Negative. Neurological: Negative. Hematological: Negative. Psychiatric/Behavioral: Negative. Objective:     Physical Exam  Vitals reviewed. Constitutional:       Appearance: Normal appearance. HENT:      Head: Normocephalic and atraumatic. Nose: Nose normal.   Eyes:      Conjunctiva/sclera: Conjunctivae normal.      Pupils: Pupils are equal, round, and reactive to light.    Cardiovascular:      Pulses: Dorsalis pedis pulses are 2+ on the left side. Posterior tibial pulses are 2+ on the left side. Pulmonary:      Effort: Pulmonary effort is normal.   Musculoskeletal:        Feet:    Feet:      Comments: The patient's clinical examination today is significant for a mildly tender bony exostosis to the plantar aspect of the left midfoot at the area of the first metatarsocuneiform joint. There does appear to be an associated repetitious bursal sac underneath the area of the bony prominence. There were no open lesions. There are no signs of infection. Palpable pulses are noted bilaterally. Skin:     General: Skin is warm. Capillary Refill: Capillary refill takes less than 2 seconds. Neurological:      General: No focal deficit present. Mental Status: He is alert and oriented to person, place, and time. Psychiatric:         Mood and Affect: Mood normal.         Behavior: Behavior normal.         Thought Content:  Thought content normal.

## 2023-12-19 DIAGNOSIS — F51.01 PRIMARY INSOMNIA: ICD-10-CM

## 2023-12-19 RX ORDER — ZOLPIDEM TARTRATE 10 MG/1
10 TABLET ORAL
Qty: 30 TABLET | Refills: 0 | Status: SHIPPED | OUTPATIENT
Start: 2023-12-19

## 2023-12-19 NOTE — TELEPHONE ENCOUNTER
Reason for call:   [x] Refill   [] Prior Auth  [] Other:     Office:   [x] PCP/Provider -   [] Specialty/Provider -     Medication: Ambien    Dose/Frequency: 10 mg     Quantity: #30    Pharmacy: [facility]    Does the patient have enough for 3 days?   [] Yes   [x] No - Send as HP to POD

## 2023-12-19 NOTE — TELEPHONE ENCOUNTER
1 [id number] 11/22/2023 11/22/2023 Zolpidem Tartrate (Tablet) 30.0 30 10 MG NA MINGO SHEA POGODZINSKI [facility] [employer] Commercial Insurance 0 / 0 [place]    1 [id number] 10/20/2023 10/20/2023 Zolpidem Tartrate (Tablet) 30.0 30 10 MG NA JESSIE [email or web address] [employer] [employer] Commercial Insurance 0 / 0 [place]    1 [id number] 09/22/2023 09/21/2023 Zolpidem Tartrate (Tablet) 30.0 30 10 MG NA MINGO SHEA POGODZINSKI [facility] [employer] [employer] Commercial Insurance 0 / 0 [place]    1 [id number] 08/24/2023 08/24/2023 Zolpidem Tartrate (Tablet) 30.0 30 10 MG NA MINGO SHEA POGODZINSKI [facility] [employer] Commercial Insurance 0 / 0 [place]    1 [id number] 07/24/2023 07/24/2023 Zolpidem Tartrate (Tablet) 30.0 30 10 MG NA JESSIE [facility] [employer] Commercial Insurance 0 / 0 [place]    1 [id number] 06/09/2023 06/06/2023 Zolpidem Tartrate (Tablet) 30.0 30 10 MG NA MINGO SHEA POGODZINSKI [facility] Commercial Insurance 0 / 1 [place]    1 [id number] 06/08/2023 06/08/2023 Zolpidem Tartrate (Tablet) 15.0 15 10 MG MINGO THURMAN POGODZINSKI [facility] [employer] Commercial Insurance 0 / 0 [place]    1 [id number] 05/10/2023 05/10/2023 Zolpidem Tartrate (Tablet) 30.0 30 10 MG MINGO THURMAN POGODZINSKI [facility] [employer] Commercial Insurance [place] / 0 [place]    1 [id number] 04/11/2023 04/07/2023 Zolpidem Tartrate (Tablet) 30.0 30 10 MG NA [facility] [employer] Commercial Insurance 0 / 0 [place]    1 [id number] 03/13/2023 03/13/2023 Zolpidem Tartrate (Tablet) 30.0 30 10 MG NA MINGO SHEA POGODZINSKI [facility] [employer] Commercial Insurance [place] / 0 [place]

## 2024-01-02 ENCOUNTER — TELEPHONE (OUTPATIENT)
Dept: CARDIAC SURGERY | Facility: CLINIC | Age: 54
End: 2024-01-02

## 2024-01-02 NOTE — TELEPHONE ENCOUNTER
Called patient to schedule coronary calcium score test/ 1 year f/u with Dr. Her. Pt states he will call cs to schedule his labs.

## 2024-01-18 ENCOUNTER — TELEPHONE (OUTPATIENT)
Age: 54
End: 2024-01-18

## 2024-01-18 NOTE — TELEPHONE ENCOUNTER
Pt called in stating he needs a refill of   zolpidem (AMBIEN) 10 mg tablet [[id number]] to be sent to:     [id number] -   [address] [address]  Phone: [phone]  Fax: [phone]  ORLIN #: [id number]

## 2024-01-22 DIAGNOSIS — F51.01 PRIMARY INSOMNIA: ICD-10-CM

## 2024-01-22 NOTE — TELEPHONE ENCOUNTER
Reason for call: patient called back in for medication refills on the status sent to dr davis in previous encounter had to que the med and make a new enconter please sign off if agreeable to refills for patient  [x] Refill   [] Prior Auth  [] Other:     Office:   [x] PCP/Provider -   [] Specialty/Provider -     Medication:   zolpidem (AMBIEN) 10 mg tablet  Dose: 10 mg Route: Oral Frequency: Daily at bedtime PRN for sleep  Dispense Quantity: 30 tablet  sig: Take 1 tablet (10 mg total) by mouth daily at bedtime as needed for sleep       Does the patient have enough for 3 days?   [] Yes   [x] No - Send as HP to POD

## 2024-01-23 RX ORDER — ZOLPIDEM TARTRATE 10 MG/1
10 TABLET ORAL
Qty: 30 TABLET | Refills: 1 | Status: SHIPPED | OUTPATIENT
Start: 2024-01-23

## 2024-01-23 NOTE — TELEPHONE ENCOUNTER
Patient called in again requesting his medication. Advised medication was sent and needs to be signed off by provider.

## 2024-01-26 ENCOUNTER — APPOINTMENT (OUTPATIENT)
Dept: LAB | Facility: CLINIC | Age: 54
End: 2024-01-26
Payer: COMMERCIAL

## 2024-01-26 DIAGNOSIS — R22.42 FOOT MASS, LEFT: ICD-10-CM

## 2024-01-26 DIAGNOSIS — E78.2 MIXED HYPERLIPIDEMIA: Primary | ICD-10-CM

## 2024-01-26 DIAGNOSIS — M89.8X7 EXOSTOSIS OF BONE OF FOOT: ICD-10-CM

## 2024-01-26 DIAGNOSIS — E78.2 MIXED HYPERLIPIDEMIA: ICD-10-CM

## 2024-01-26 LAB
ALBUMIN SERPL BCP-MCNC: 4.5 G/DL (ref 3.5–5)
ALP SERPL-CCNC: 47 U/L (ref 34–104)
ALT SERPL W P-5'-P-CCNC: 17 U/L (ref 7–52)
ANION GAP SERPL CALCULATED.3IONS-SCNC: 4 MMOL/L
AST SERPL W P-5'-P-CCNC: 16 U/L (ref 13–39)
BILIRUB SERPL-MCNC: 0.51 MG/DL (ref 0.2–1)
BUN SERPL-MCNC: 18 MG/DL (ref 5–25)
CALCIUM SERPL-MCNC: 9.2 MG/DL (ref 8.4–10.2)
CHLORIDE SERPL-SCNC: 106 MMOL/L (ref 96–108)
CHOLEST SERPL-MCNC: 161 MG/DL
CO2 SERPL-SCNC: 30 MMOL/L (ref 21–32)
CREAT SERPL-MCNC: 0.87 MG/DL (ref 0.6–1.3)
ERYTHROCYTE [DISTWIDTH] IN BLOOD BY AUTOMATED COUNT: 12.2 % (ref 11.6–15.1)
GFR SERPL CREATININE-BSD FRML MDRD: 98 ML/MIN/1.73SQ M
GLUCOSE P FAST SERPL-MCNC: 109 MG/DL (ref 65–99)
HCT VFR BLD AUTO: 43.8 % (ref 36.5–49.3)
HDLC SERPL-MCNC: 45 MG/DL
HGB BLD-MCNC: 14.5 G/DL (ref 12–17)
LDLC SERPL CALC-MCNC: 91 MG/DL (ref 0–100)
LDLC SERPL DIRECT ASSAY-MCNC: 101 MG/DL (ref 0–100)
MCH RBC QN AUTO: 30.7 PG (ref 26.8–34.3)
MCHC RBC AUTO-ENTMCNC: 33.1 G/DL (ref 31.4–37.4)
MCV RBC AUTO: 93 FL (ref 82–98)
NONHDLC SERPL-MCNC: 116 MG/DL
PLATELET # BLD AUTO: 134 THOUSANDS/UL (ref 149–390)
PMV BLD AUTO: 11.6 FL (ref 8.9–12.7)
POTASSIUM SERPL-SCNC: 4.3 MMOL/L (ref 3.5–5.3)
PROT SERPL-MCNC: 7.3 G/DL (ref 6.4–8.4)
RBC # BLD AUTO: 4.73 MILLION/UL (ref 3.88–5.62)
SODIUM SERPL-SCNC: 140 MMOL/L (ref 135–147)
TRIGL SERPL-MCNC: 127 MG/DL
WBC # BLD AUTO: 5 THOUSAND/UL (ref 4.31–10.16)

## 2024-01-26 PROCEDURE — 80061 LIPID PANEL: CPT

## 2024-01-26 PROCEDURE — 36415 COLL VENOUS BLD VENIPUNCTURE: CPT

## 2024-01-26 PROCEDURE — 83721 ASSAY OF BLOOD LIPOPROTEIN: CPT

## 2024-01-26 PROCEDURE — 85027 COMPLETE CBC AUTOMATED: CPT

## 2024-01-26 PROCEDURE — 80053 COMPREHEN METABOLIC PANEL: CPT

## 2024-01-29 ENCOUNTER — TELEPHONE (OUTPATIENT)
Dept: CARDIAC SURGERY | Facility: CLINIC | Age: 54
End: 2024-01-29

## 2024-01-29 NOTE — TELEPHONE ENCOUNTER
Spoke with patient about scheduling his ct coronary calcium test Dr. Her had ordered back in May. He states he did get blood work on 1/26 however it was not the study that dr. Her needs for his appt tomorrow, Gave c/s # for him to schedule.

## 2024-01-30 ENCOUNTER — OFFICE VISIT (OUTPATIENT)
Dept: CARDIAC SURGERY | Facility: CLINIC | Age: 54
End: 2024-01-30
Payer: COMMERCIAL

## 2024-01-30 VITALS
SYSTOLIC BLOOD PRESSURE: 116 MMHG | HEIGHT: 74 IN | HEART RATE: 52 BPM | WEIGHT: 246 LBS | BODY MASS INDEX: 31.57 KG/M2 | OXYGEN SATURATION: 99 % | DIASTOLIC BLOOD PRESSURE: 74 MMHG

## 2024-01-30 DIAGNOSIS — Z95.1 S/P CABG X 4: ICD-10-CM

## 2024-01-30 DIAGNOSIS — I25.10 CORONARY ARTERY DISEASE INVOLVING NATIVE CORONARY ARTERY OF NATIVE HEART WITHOUT ANGINA PECTORIS: ICD-10-CM

## 2024-01-30 DIAGNOSIS — E78.2 MIXED HYPERLIPIDEMIA: ICD-10-CM

## 2024-01-30 DIAGNOSIS — I25.10 ARTERIOSCLEROTIC CARDIOVASCULAR DISEASE: Primary | ICD-10-CM

## 2024-01-30 PROCEDURE — 93000 ELECTROCARDIOGRAM COMPLETE: CPT | Performed by: INTERNAL MEDICINE

## 2024-01-30 PROCEDURE — 99214 OFFICE O/P EST MOD 30 MIN: CPT | Performed by: INTERNAL MEDICINE

## 2024-01-30 NOTE — PROGRESS NOTES
Cardiology Follow Up Visit    Jose Nix  1970  [id number]  [facility] [place]  [address]  [address]  [facility] [address]  [phone]    1. Arteriosclerotic cardiovascular disease  POCT ECG      2. Coronary artery disease involving native coronary artery of native heart without angina pectoris  POCT ECG    Lipid Panel With Direct LDL      3. S/P CABG x 4 ( Saphenous vein graft to diagonal 1, saphenous vein graft to OM3, radial to ramus intermedius, LIMA to LAD) 2012        4. Mixed hyperlipidemia  Lipid Panel With Direct LDL            Discussion/Summary:    1. CAD  A. CABG x 4 2012, after presenting with new onset angina and abnormal stress, nm stress surveillance normal 2020, normal lvef     2.  Hyperlipidemia on rosuva  20, has been off Zetia, coronary calcium score required per insurance guidelines for PCSK9 inhibitor therapy to achieve LDL less than 70     3. Palpitations, resolved, no further    Plan:      Patient overall doing well.  Given early onset of coronary artery disease with CABG in his early 40s feel should achieve LDL less than 70.  He was on Zetia in the past but not taking for the last few years.  Approximately 9 years ago there was 1 value in the mid 60s.  Then PCSK9 inhibitor therapy is best for him.  However, needs coronary calcium scoring per insurance guidelines to be over 302 approved.  Discussed ongoing clinical manifestations of coronary artery disease which she has none.  Counseled on management.      Face to face time: 25minutes  Documentation: 10minutes  Review labs: 5 minutes  Interval History:    53-year-old male history of CABG x4 2012 after presenting with new onset exertional chest discomfort and abnormal stress test which prompted cardiac catheterization.    He has been doing well since.    Insurance did not cover Repatha as required a coronary calcium score.  This was  ordered but he did not get.  They require it to be over 300.    Blood pressure has been well controlled.    Feels well, no complaints.  Does play tennis 3 times a week.  He is asymptomatic.    LDL recently 101.      Patient Active Problem List   Diagnosis    S/P CABG x 4 ( Saphenous vein graft to diagonal 1, saphenous vein graft to OM3, radial to ramus intermedius, LIMA to LAD) 2012    Hypertension    Arteriosclerotic cardiovascular disease    Hyperlipidemia    Insomnia    Chronic ITP (idiopathic thrombocytopenia) ([facility])    Major depressive disorder with single episode, in partial remission ([facility])    Memory dysfunction    Palpitations    Change in vision    Foot mass, left     Past Medical History:   Diagnosis Date    Acute angina ([facility])     Allergic     High blood pressure     NOT HYPERTENSION    Irregular heartbeat     ST elevation (STEMI) myocardial infarction of unspecified site ([facility])     Valvular heart disease      Social History     Socioeconomic History    Marital status: /Civil Union     Spouse name: Not on file    Number of children: 2    Years of education: Not on file    Highest education level: Not on file   Occupational History    Occupation: TRAVEL    Tobacco Use    Smoking status: Never    Smokeless tobacco: Never   Vaping Use    Vaping status: Never Used   Substance and Sexual Activity    Alcohol use: Yes     Alcohol/week: 3.0 standard drinks of alcohol     Types: 3 Cans of beer per week     Comment: BEING A SOCIAL DRINKER: <= 1 DRINK/WEEK    Drug use: Never    Sexual activity: Yes     Partners: Female     Birth control/protection: Male Sterilization   Other Topics Concern    Not on file   Social History Narrative    NO DAILY COFFEE CONSUMPTION (___CUPS/DAY)    NO DAILY COLA CONSUMPTION (___CANS/DAY)    DAILY TEA CONSUMPTION (___CUPS/DAY)     Social Determinants of Health     Financial Resource Strain: Not on file   Food Insecurity: Not on file   Transportation Needs: Not on  file   Physical Activity: Not on file   Stress: Not on file   Social Connections: Not on file   Intimate Partner Violence: Not on file   Housing Stability: Not on file      Family History   Problem Relation Age of Onset    Coronary artery disease Mother     Heart disease Mother     Hypertension Mother      Past Surgical History:   Procedure Laterality Date    CARDIAC SURGERY      OPEN HEART QUAD BYPASS; RESOLVED: 2012    CORONARY ANGIOPLASTY  02/21/2012    CORONARY ANGIOGRAPHY WITHOUT CONCOMITANT LEFT HEART CATHETERIZATION; 90% PROXIMAL LAD, 90% 1ST DIAG OSTIUM AND 95% PROXIMAL THIRD, 99% PROXIMAL RAMUS INTERMEDIUS    CORONARY ANGIOPLASTY WITH STENT PLACEMENT  02/21/2012    4 VESSEL DISEASE TO HAVE CABG; MANAGED BY: SHABBIR SNYDER    CORONARY ARTERY BYPASS GRAFT      LAST ASSESSED: 11/17/17    CORONARY ARTERY BYPASS GRAFT  02/29/2012    CABG X4 (SVG TO 1ST DIAGNOL, SVG TO 3RD OBTUSE MARGINAL, FREE RADIAL ARTERY TO RAMUS INTERMEDIUS, SULLIVAN TO LAD) BY DR. GUTIERREZ 2012     VASECTOMY  10/27/2015    SURGERY VAS DEFERENS; MANAGED BY: LEILANI HEREDIA; LAST ASSESSED: 10/27/15       Current Outpatient Medications:     aspirin 81 mg chewable tablet, Chew 1 tablet daily, Disp: , Rfl:     metoprolol succinate (TOPROL-XL) 25 mg 24 hr tablet, TAKE 1 TABLET DAILY, Disp: 90 tablet, Rfl: 3    Multiple Vitamins-Minerals (MULTIVITAMIN WITH MINERALS) tablet, Take 2 tablets by mouth daily, Disp: , Rfl:     rosuvastatin (CRESTOR) 20 MG tablet, Take 1 tablet (20 mg total) by mouth daily, Disp: 90 tablet, Rfl: 3    sildenafil (VIAGRA) 100 mg tablet, Take 1 tablet (100 mg total) by mouth daily as needed for erectile dysfunction, Disp: 30 tablet, Rfl: 0    zolpidem (AMBIEN) 10 mg tablet, Take 1 tablet (10 mg total) by mouth daily at bedtime as needed for sleep, Disp: 30 tablet, Rfl: 1  No Known Allergies      Social, Family, Medication history reviewed and updated as necessary      Labs:     Lab Results   Component Value Date      09/19/2015    K 4.3 01/26/2024     01/26/2024    CO2 30 01/26/2024    BUN 18 01/26/2024    CREATININE 0.87 01/26/2024    CREATININE 0.83 05/26/2023    GLUCOSE 82 09/19/2015    CALCIUM 9.2 01/26/2024       Lab Results   Component Value Date    WBC 5.00 01/26/2024    HGB 14.5 01/26/2024    HGB 14.6 05/26/2023    HCT 43.8 01/26/2024    HCT 43.5 05/26/2023     (L) 01/26/2024     (L) 05/26/2023       Lab Results   Component Value Date    CHOL 129 01/21/2015     Lab Results   Component Value Date    HDL 45 01/26/2024    HDL 42 05/26/2023     Lab Results   Component Value Date    LDLCALC 91 01/26/2024    LDLCALC 78 05/26/2023     Lab Results   Component Value Date    LDLDIRECT 101 (H) 01/26/2024             Lab Results   Component Value Date    TRIG 127 01/26/2024    TRIG 71 05/26/2023       Lab Results   Component Value Date    ALT 17 01/26/2024    ALT 17 05/26/2023    AST 16 01/26/2024    AST 21 05/26/2023    ALKPHOS 47 01/26/2024    ALKPHOS 45 05/26/2023       Lab Results   Component Value Date    INR 0.94 07/15/2021    INR 0.95 09/08/2020       Lab Results   Component Value Date    NTBNP 23 07/15/2021       Lab Results   Component Value Date    HGBA1C 5.3 02/03/2020           Imaging: Reviewed in epic        Review of Systems:  14 systems reviewed and negative with exception of the above        PHYSICAL EXAM:      Vitals:    01/30/24 0806   BP: 116/74   Pulse: (!) 52   SpO2: 99%       Body mass index is 31.58 kg/m².   Weight (last 2 days)       Date/Time Weight    01/30/24 0806 112 (246)              Gen: No acute distress  HEENT: anicteric, mucous membranes moist  Neck: supple, no jugular venous distention, or carotid bruit  Heart: regular, normal s1 and s2, no murmur/rub or gallop  Lungs :clear to auscultation bilaterally, no rales/rhonchi or wheeze  Abdomen: soft nontender, normoactive bowel sounds, no organomegaly  Ext: warm and perfused, normal femoral pulses, no edema, or  clubbing  Skin: warm, no rashes  Neuro: AAO x 3, no focal findings  Psychiatric: normal affect  Musculoskeletal: no obvious joint deformities.        This note was completed in part utilizing [employer] direct voice recognition software.   Grammatical errors, random word insertion, spelling mistakes, and incomplete sentences may be an occasional consequence of the system secondary to software limitations, ambient noise and hardware issues. At the time of dictation, efforts were made to edit, clarify and /or correct errors.  Please read the chart carefully and recognize, using context, where substitutions have occurred.  If you have any questions or concerns about the context, text or information contained within the body of this dictation, please contact myself, the provider, for further clarification.

## 2024-02-02 ENCOUNTER — HOSPITAL ENCOUNTER (OUTPATIENT)
Dept: CT IMAGING | Facility: HOSPITAL | Age: 54
Discharge: HOME | End: 2024-02-02
Attending: INTERNAL MEDICINE
Payer: COMMERCIAL

## 2024-02-02 DIAGNOSIS — Z95.1 S/P CABG X 4: ICD-10-CM

## 2024-02-02 DIAGNOSIS — E78.2 MIXED HYPERLIPIDEMIA: ICD-10-CM

## 2024-02-02 PROCEDURE — 75571 CT HRT W/O DYE W/CA TEST: CPT

## 2024-02-02 PROCEDURE — G1004 CDSM NDSC: HCPCS

## 2024-02-28 NOTE — PRE-PROCEDURE INSTRUCTIONS
Pre-Surgery Instructions:   Medication Instructions    Acetaminophen 500 MG Uses PRN- OK to take day of surgery    aspirin 81 mg chewable tablet 2/28/24 msg to surg sched and pt calling surgeon office for instructions    metoprolol succinate (TOPROL-XL) 25 mg 24 hr tablet Take night before surgery    Multiple Vitamins-Minerals (MULTIVITAMIN WITH MINERALS) tablet Stop taking 7 days prior to surgery.    rosuvastatin (CRESTOR) 20 MG tablet Take night before surgery    zolpidem (AMBIEN) 10 mg tablet Uses PRN- DO NOT take day of surgery    Medication instructions for day surgery reviewed. Please use only a sip of water to take your instructed medications. Avoid all over the counter vitamins, supplements and NSAIDS for one week prior to surgery per anesthesia guidelines. Tylenol is ok to take as needed.     You will receive a call one business day prior to surgery with an arrival time and hospital directions. If your surgery is scheduled on a Monday, the hospital will be calling you on the Friday prior to your surgery. If you have not heard from anyone by 8pm, please call the hospital supervisor through the hospital  at [phone]. ([place] [phone] or [place] [phone]).    Do not eat or drink anything after midnight the night before your surgery, including candy, mints, lifesavers, or chewing gum. Do not drink alcohol 24hrs before your surgery. Try not to smoke at least 24hrs before your surgery.       Follow the pre surgery showering instructions as listed in the “My Surgical Experience Booklet” or otherwise provided by your surgeon's office. Do not use a blade to shave the surgical area 1 week before surgery. It is okay to use a clean electric clippers up to 24 hours before surgery. Do not apply any lotions, creams, including makeup, cologne, deodorant, or perfumes after showering on the day of your surgery. Do not use dry shampoo, hair spray, hair gel, or any type of hair products.     No contact  lenses, eye make-up, or artificial eyelashes. Remove nail polish, including gel polish, and any artificial, gel, or acrylic nails if possible. Remove all jewelry including rings and body piercing jewelry.     Wear causal clothing that is easy to take on and off. Consider your type of surgery.    Keep any valuables, jewelry, piercings at home. Please bring any specially ordered equipment (sling, braces) if indicated.    Arrange for a responsible person to drive you to and from the hospital on the day of your surgery. Please confirm the visitor policy for the day of your procedure when you receive your phone call with an arrival time.     Call the surgeon's office with any new illnesses, exposures, or additional questions prior to surgery.    Please reference your “My Surgical Experience Booklet” for additional information to prepare for your upcoming surgery.

## 2024-02-29 ENCOUNTER — CONSULT (OUTPATIENT)
Dept: FAMILY MEDICINE CLINIC | Facility: CLINIC | Age: 54
End: 2024-02-29
Payer: COMMERCIAL

## 2024-02-29 VITALS
HEIGHT: 74 IN | TEMPERATURE: 97.3 F | WEIGHT: 244 LBS | OXYGEN SATURATION: 98 % | SYSTOLIC BLOOD PRESSURE: 110 MMHG | BODY MASS INDEX: 31.32 KG/M2 | DIASTOLIC BLOOD PRESSURE: 70 MMHG | HEART RATE: 67 BPM

## 2024-02-29 DIAGNOSIS — Z01.818 PREOPERATIVE CLEARANCE: Primary | ICD-10-CM

## 2024-02-29 DIAGNOSIS — I10 PRIMARY HYPERTENSION: ICD-10-CM

## 2024-02-29 DIAGNOSIS — I25.10 ARTERIOSCLEROTIC CARDIOVASCULAR DISEASE: ICD-10-CM

## 2024-02-29 PROCEDURE — 99213 OFFICE O/P EST LOW 20 MIN: CPT | Performed by: FAMILY MEDICINE

## 2024-03-04 ENCOUNTER — TELEPHONE (OUTPATIENT)
Dept: PODIATRY | Facility: CLINIC | Age: 54
End: 2024-03-04

## 2024-03-04 NOTE — PROGRESS NOTES
PRE-OPERATIVE EVALUATION  [facility] - [facility]    NAME: Jose Nix  AGE: 53 y.o. SEX: male  : 1970     DATE: 3/4/2024    Pre-Operative Evaluation:     Chief Complaint: Pre-operative Evaluation     Surgery: left excision exostosis with excision of adventitious bursa of the left foot  Anticipated Date of Surgery: 3/8/24  Referring Provider:      History of Present Illness:     Jose Nix is a 53 y.o. male who presents to the office today for a preoperative consultation.Planned anesthesia is general. Patient has a bleeding risk of: no remote history of abnormal bleeding. Patient does not have objections to receiving blood products if needed. Current anti-platelet/anti-coagulation medications that the patient is prescribed includes: Aspirin.      Assessment of Chronic Conditions:   - Hypertension:       Assessment of Cardiac Risk:  Denies unstable or severe angina or MI in the last 6 weeks or history of stent placement in the last year   Denies decompensated heart failure (e.g. New onset heart failure, NYHA functional class IV heart failure, or worsening existing heart failure)  Denies significant arrhythmias such as high grade AV block, symptomatic ventricular arrhythmia, newly recognized ventricular tachycardia, supraventricular tachycardia with resting heart rate >100, or symptomatic bradycardia  Denies severe heart valve disease including aortic stenosis or symptomatic mitral stenosis     Exercise Capacity:  Able to walk 4 blocks without symptoms?:Yes  Able to walk 2 flights without symptoms?: Yes    Prior Anesthesia Reactions: No     Personal history of venous thromboembolic disease? No    History of steroid use for >2 weeks within last year? No          Review of Systems:     Review of Systems   Constitutional:  Negative for appetite change, chills and fever.   HENT:  Negative for ear pain, facial swelling, rhinorrhea, sinus pain, sore throat and  trouble swallowing.    Eyes:  Negative for discharge and redness.   Respiratory:  Negative for chest tightness, shortness of breath and wheezing.    Cardiovascular:  Negative for chest pain and palpitations.   Gastrointestinal:  Negative for abdominal pain, diarrhea, nausea and vomiting.   Endocrine: Negative for polyuria.   Genitourinary:  Negative for dysuria and urgency.   Musculoskeletal:  Negative for arthralgias and back pain.        +  left foot pain    Skin:  Negative for rash.   Neurological:  Negative for dizziness, weakness and headaches.   Hematological:  Negative for adenopathy.   Psychiatric/Behavioral:  Negative for behavioral problems, confusion and sleep disturbance.    All other systems reviewed and are negative.       Problem List:     Patient Active Problem List   Diagnosis    S/P CABG x 4 ( Saphenous vein graft to diagonal 1, saphenous vein graft to OM3, radial to ramus intermedius, LIMA to LAD) 2012    Hypertension    Arteriosclerotic cardiovascular disease    Hyperlipidemia    Insomnia    Chronic ITP (idiopathic thrombocytopenia) ([facility])    Major depressive disorder with single episode, in partial remission ([facility])    Memory dysfunction    Palpitations    Change in vision    Foot mass, left        Allergies:     No Known Allergies     Current Medications:       Current Outpatient Medications:     Acetaminophen 500 MG, Take by mouth every 6 (six) hours as needed for mild pain, Disp: , Rfl:     aspirin 81 mg chewable tablet, Chew 1 tablet daily, Disp: , Rfl:     metoprolol succinate (TOPROL-XL) 25 mg 24 hr tablet, TAKE 1 TABLET DAILY (Patient taking differently: Take 25 mg by mouth every evening), Disp: 90 tablet, Rfl: 3    Multiple Vitamins-Minerals (MULTIVITAMIN WITH MINERALS) tablet, Take 2 tablets by mouth daily at bedtime, Disp: , Rfl:     rosuvastatin (CRESTOR) 20 MG tablet, Take 1 tablet (20 mg total) by mouth daily (Patient taking differently: Take 20 mg by mouth every evening), Disp: 90  "tablet, Rfl: 3    zolpidem (AMBIEN) 10 mg tablet, Take 1 tablet (10 mg total) by mouth daily at bedtime as needed for sleep, Disp: 30 tablet, Rfl: 1    sildenafil (VIAGRA) 100 mg tablet, Take 1 tablet (100 mg total) by mouth daily as needed for erectile dysfunction (Patient not taking: Reported on 2/29/2024), Disp: 30 tablet, Rfl: 0     Past History:     Past Medical History:   Diagnosis Date    Acute angina ([facility])     per pt \"no recent episode--anxiety attack approx 4 years ago\"    Allergic     seasonal    Chronic ITP (idiopathic thrombocytopenia) ([facility])     Coronary artery disease     [facility]- Dr Snyder    Dental crown present     and Veneers    Family history of diabetes mellitus in mother     \"pt does not know biological father or the history\"    High blood pressure     NOT HYPERTENSION    History of heart murmur in childhood     Hyperlipidemia     Low platelet count ([facility])     no longer seeing a hematologist    Shortness of breath     \"generally with activity\"    ST elevation (STEMI) myocardial infarction of unspecified site ([facility])     per pt \"technically no heart attack\"    Valvular heart disease     Wears glasses         Past Surgical History:   Procedure Laterality Date    CARDIAC SURGERY      OPEN HEART QUAD BYPASS; RESOLVED: 2012    CORONARY ANGIOPLASTY  02/21/2012    CORONARY ANGIOGRAPHY WITHOUT CONCOMITANT LEFT HEART CATHETERIZATION; 90% PROXIMAL LAD, 90% 1ST DIAG OSTIUM AND 95% PROXIMAL THIRD, 99% PROXIMAL RAMUS INTERMEDIUS    CORONARY ANGIOPLASTY WITH STENT PLACEMENT  02/21/2012    4 VESSEL DISEASE TO HAVE CABG; MANAGED BY: SHABBIR SNYDER    CORONARY ARTERY BYPASS GRAFT      LAST ASSESSED: 11/17/17--metal wires in sternum for closure    CORONARY ARTERY BYPASS GRAFT  02/29/2012    CABG X4 (SVG TO 1ST DIAGNOL, SVG TO 3RD OBTUSE MARGINAL, FREE RADIAL ARTERY TO RAMUS INTERMEDIUS, SULLIVAN TO LAD) BY DR. GUTIERREZ 2012     FOOT SURGERY Right     bone spur    TONSILLECTOMY      VASECTOMY  10/27/2015    " "SURGERY VAS DEFERENS; MANAGED BY: LEILANI HEREDIA; LAST ASSESSED: 10/27/15    WISDOM TOOTH EXTRACTION          Family History   Problem Relation Age of Onset    Coronary artery disease Mother     Heart disease Mother     Hypertension Mother         Social History     Tobacco Use    Smoking status: Never    Smokeless tobacco: Never   Vaping Use    Vaping status: Never Used   Substance Use Topics    Alcohol use: Yes     Alcohol/week: 3.0 standard drinks of alcohol     Types: 3 Cans of beer per week     Comment: BEING A SOCIAL DRINKER: <= 3 DRINK/WEEK    Drug use: Never        Physical Exam:      /70 (BP Location: Left arm, Patient Position: Sitting, Cuff Size: Large)   Pulse 67   Temp (!) 97.3 °F (36.3 °C)   Ht 6' 2\" (1.88 m)   Wt 111 kg (244 lb)   SpO2 98%   BMI 31.33 kg/m²     Physical Exam  Constitutional:       General: He is not in acute distress.     Appearance: He is well-developed.   HENT:      Head: Normocephalic and atraumatic.      Right Ear: External ear normal.      Left Ear: External ear normal.      Nose: Nose normal.      Mouth/Throat:      Pharynx: No oropharyngeal exudate.   Eyes:      General: No scleral icterus.        Right eye: No discharge.         Left eye: No discharge.      Conjunctiva/sclera: Conjunctivae normal.      Pupils: Pupils are equal, round, and reactive to light.   Neck:      Thyroid: No thyromegaly.      Vascular: No JVD.      Trachea: No tracheal deviation.   Cardiovascular:      Rate and Rhythm: Normal rate and regular rhythm.      Heart sounds: Normal heart sounds. No murmur heard.  Pulmonary:      Effort: No respiratory distress.      Breath sounds: Normal breath sounds. No stridor. No wheezing or rales.   Abdominal:      General: Bowel sounds are normal. There is no distension.      Palpations: Abdomen is soft. There is no mass.      Tenderness: There is no abdominal tenderness. There is no guarding or rebound.   Musculoskeletal:         General: Tenderness " present. Normal range of motion.      Cervical back: Normal range of motion and neck supple.      Comments: + left foot pain along lateral aspect of foot   Lymphadenopathy:      Cervical: No cervical adenopathy.   Skin:     General: Skin is warm.      Findings: No erythema or rash.   Neurological:      Mental Status: He is alert and oriented to person, place, and time.      Cranial Nerves: No cranial nerve deficit.      Motor: No abnormal muscle tone.      Coordination: Coordination normal.      Deep Tendon Reflexes: Reflexes are normal and symmetric. Reflexes normal.   Psychiatric:         Behavior: Behavior normal.         Thought Content: Thought content normal.         Judgment: Judgment normal.           Data:     Pre-operative work-up    Laboratory Results: I have personally reviewed the pertinent laboratory results/reports . Preop labs and EKG are all normal.      EKG: I have personally reviewed pertinent reports.            Assessment:     1. Preoperative clearance      Patient cleared medically for surgery      2. Primary hypertension        3. Arteriosclerotic cardiovascular disease      Patient cleared medically for surgery by cardiology           Plan:     53 y.o. male with planned surgery: as above.        RCRI  High Risk surgery?         1 Point  CAD History:         1 Point   MI; Positive Stress Test; CP due to Mi;  Nitrate Usage to control Angina; Pathologic Q wave on EKG  CHF Active:         1 Point   Pulm Edema; Paroxysmal Nocturnal Dyspnea;  Bibasilar Rales (crackles);S3; CHF on CXR  Cerebrovascular Disease (TIA or CVA):     1 Point  DM on Insulin:        1 Point  Serum Creat >2.0 mg/dl:       1 Point          Total Points: 0     Scorin: Class I, Very Low Risk (0.4%)     1: Class II, Low risk (0.9%)     2: Class III Moderate (6.6%)     3: Class IV High (>11%)    1. Further preoperative workup as follows:   - None; no further preoperative work-up is required    2. Medication  "Management/Recommendations:   - Patient has been instructed to avoid aspirin containing medications or non-steroidal anti-inflammatory drugs for the week preceding surgery.    3. Prophylaxis for cardiac events with perioperative beta-blockers: not indicated.    4. Patient requires further consultation with: None    LEYDA Risk:  GFR:      - If GFR>60, Hold ACE/ARB/Diuretic on the day of surgery, and NSAIDS 10 days before.  - If GFR<45, Consider PRE and POST op Nephrology Consult.  - If 46 <GFR> 59 : Has Patient had LEYDA in last 6 Months? no   If YES: Preop Nephrology consult   If No:  Post Op Nephrology consult.    Clearance  Patient is CLEARED for surgery without any additional cardiac testing.     A copy of this evaluation was transmitted electronically or by fax to the requesting provider.     Sara Johnson [address] [address]  Phone#  [phone]  Fax#  [phone]    Please be aware that this note contains text that was dictated and there may be errors pertaining to \"sound-alike\" words during the dictation process.     "

## 2024-03-04 NOTE — TELEPHONE ENCOUNTER
Caller: Jurgen Mcqueen MD    Doctor: Khoa Smith DPM    Reason for call: Jose was there for a pre op clearance.  The notes reflect that he is cleared. Is that enough?  If not, please fax over the form that they can fill out.    Call back#: [phone] - please call their office either way

## 2024-03-05 ENCOUNTER — TELEPHONE (OUTPATIENT)
Age: 54
End: 2024-03-05

## 2024-03-05 NOTE — TELEPHONE ENCOUNTER
2/29 Preop apt with Dr. Johnson.    Toma from pre admission at [facility] is calling regarding the notes.  They need office notes addended.  Review of Systems needs to have reviewed extremities (heart, lung etc)  Also, she stated under Pre Admin testing there are no notes.  She stated this all has to be completed because it is a podiatry case.    Please call Toma at [phone] for questions.    Thank you

## 2024-03-07 ENCOUNTER — ANESTHESIA EVENT (OUTPATIENT)
Dept: PERIOP | Facility: HOSPITAL | Age: 54
End: 2024-03-07
Payer: COMMERCIAL

## 2024-03-07 NOTE — ANESTHESIA PREPROCEDURE EVALUATION
"Procedure:  EXCISION EXOSTOSIS LEFT FOOT WITH EXCISION OF ADVENTITIOUS BURSA (Left: Foot)    Relevant Problems   CARDIO   (+) Hyperlipidemia   (+) Hypertension      HEMATOLOGY   (+) Chronic ITP (idiopathic thrombocytopenia) (HCC)      NEURO/PSYCH   (+) Major depressive disorder with single episode, in partial remission (HCC)      Past Medical History:   Diagnosis Date    Acute angina (HCC)     per pt \"no recent episode--anxiety attack approx 4 years ago\"    Allergic     seasonal    Chronic ITP (idiopathic thrombocytopenia) (HCC)     Coronary artery disease     [facility]- Dr Her    Dental crown present     and Veneers    Family history of diabetes mellitus in mother     \"pt does not know biological father or the history\"    High blood pressure     NOT HYPERTENSION    History of heart murmur in childhood     Hyperlipidemia     Low platelet count (HCC)     no longer seeing a hematologist    Shortness of breath     \"generally with activity\"    ST elevation (STEMI) myocardial infarction of unspecified site (HCC)     per pt \"technically no heart attack\"    Valvular heart disease     Wears glasses      Lab Results   Component Value Date    WBC 5.00 01/26/2024    HGB 14.5 01/26/2024    HCT 43.8 01/26/2024    MCV 93 01/26/2024     (L) 01/26/2024         Physical Exam    Airway    Mallampati score: II  TM Distance: >3 FB  Neck ROM: full     Dental   No notable dental hx     Cardiovascular  Rhythm: regular, Rate: normal    Pulmonary   Breath sounds clear to auscultation    Other Findings  Intercisor Distance > 3cm          Anesthesia Plan  ASA Score- 2     Anesthesia Type- general with ASA Monitors.         Additional Monitors:     Airway Plan: LMA.    Comment: Discussed benefits/risks of general anesthesia including possibility of mouth/throat pain, injury to lips/teeth, nausea/vomiting, and surgical pain along with more rare complications such as stroke, MI, pneumonia, aspiration, and injury to blood vessels. " All questions answered.    Discussed nerve block to assist with post-operative analgesia. Discussed possibility of uncommon complications including permanent nerve injury, damage to blood vessels, infection local anesthetic toxicity, and nerve block failure. Patient understands and wishes to proceed.       .       Plan Factors-Exercise tolerance (METS): >4 METS.    Chart reviewed. EKG reviewed.  Existing labs reviewed.                   Induction- intravenous.    Postoperative Plan- Plan for postoperative opioid use. Planned trial extubation    Informed Consent- Anesthetic plan and risks discussed with patient.  I personally reviewed this patient with the CRNA. Discussed and agreed on the Anesthesia Plan with the CRNA..

## 2024-03-08 ENCOUNTER — HOSPITAL ENCOUNTER (OUTPATIENT)
Facility: HOSPITAL | Age: 54
Setting detail: OUTPATIENT SURGERY
Discharge: HOME | End: 2024-03-08
Attending: PODIATRIST | Admitting: PODIATRIST
Payer: COMMERCIAL

## 2024-03-08 ENCOUNTER — APPOINTMENT (OUTPATIENT)
Dept: RADIOLOGY | Facility: HOSPITAL | Age: 54
End: 2024-03-08
Payer: COMMERCIAL

## 2024-03-08 ENCOUNTER — ANESTHESIA (OUTPATIENT)
Dept: PERIOP | Facility: HOSPITAL | Age: 54
End: 2024-03-08
Payer: COMMERCIAL

## 2024-03-08 VITALS
DIASTOLIC BLOOD PRESSURE: 71 MMHG | RESPIRATION RATE: 16 BRPM | BODY MASS INDEX: 31.18 KG/M2 | SYSTOLIC BLOOD PRESSURE: 117 MMHG | HEART RATE: 56 BPM | HEIGHT: 74 IN | TEMPERATURE: 97 F | OXYGEN SATURATION: 99 % | WEIGHT: 243 LBS

## 2024-03-08 DIAGNOSIS — Z98.890 POSTOPERATIVE STATE: Primary | ICD-10-CM

## 2024-03-08 PROCEDURE — NC001 PR NO CHARGE: Performed by: STUDENT IN AN ORGANIZED HEALTH CARE EDUCATION/TRAINING PROGRAM

## 2024-03-08 PROCEDURE — 73630 X-RAY EXAM OF FOOT: CPT

## 2024-03-08 PROCEDURE — 99024 POSTOP FOLLOW-UP VISIT: CPT | Performed by: PODIATRIST

## 2024-03-08 PROCEDURE — 28122 PARTIAL REMOVAL OF FOOT BONE: CPT | Performed by: PODIATRIST

## 2024-03-08 RX ORDER — PROPOFOL 10 MG/ML
INJECTION, EMULSION INTRAVENOUS CONTINUOUS PRN
Status: DISCONTINUED | OUTPATIENT
Start: 2024-03-08 | End: 2024-03-08

## 2024-03-08 RX ORDER — OXYCODONE HYDROCHLORIDE 5 MG/1
5 TABLET ORAL EVERY 4 HOURS PRN
Status: DISCONTINUED | OUTPATIENT
Start: 2024-03-08 | End: 2024-03-11 | Stop reason: HOSPADM

## 2024-03-08 RX ORDER — ONDANSETRON 2 MG/ML
4 INJECTION INTRAMUSCULAR; INTRAVENOUS ONCE AS NEEDED
Status: DISCONTINUED | OUTPATIENT
Start: 2024-03-08 | End: 2024-03-08 | Stop reason: HOSPADM

## 2024-03-08 RX ORDER — FENTANYL CITRATE/PF 50 MCG/ML
50 SYRINGE (ML) INJECTION
Status: DISCONTINUED | OUTPATIENT
Start: 2024-03-08 | End: 2024-03-08 | Stop reason: HOSPADM

## 2024-03-08 RX ORDER — FENTANYL CITRATE 50 UG/ML
INJECTION, SOLUTION INTRAMUSCULAR; INTRAVENOUS AS NEEDED
Status: DISCONTINUED | OUTPATIENT
Start: 2024-03-08 | End: 2024-03-08

## 2024-03-08 RX ORDER — MAGNESIUM HYDROXIDE 1200 MG/15ML
LIQUID ORAL AS NEEDED
Status: DISCONTINUED | OUTPATIENT
Start: 2024-03-08 | End: 2024-03-08 | Stop reason: HOSPADM

## 2024-03-08 RX ORDER — CHLORHEXIDINE GLUCONATE ORAL RINSE 1.2 MG/ML
15 SOLUTION DENTAL ONCE
Status: COMPLETED | OUTPATIENT
Start: 2024-03-08 | End: 2024-03-08

## 2024-03-08 RX ORDER — SODIUM CHLORIDE, SODIUM LACTATE, POTASSIUM CHLORIDE, CALCIUM CHLORIDE 600; 310; 30; 20 MG/100ML; MG/100ML; MG/100ML; MG/100ML
20 INJECTION, SOLUTION INTRAVENOUS CONTINUOUS
Status: DISCONTINUED | OUTPATIENT
Start: 2024-03-08 | End: 2024-03-11 | Stop reason: HOSPADM

## 2024-03-08 RX ORDER — ACETAMINOPHEN 325 MG/1
975 TABLET ORAL ONCE
Status: COMPLETED | OUTPATIENT
Start: 2024-03-08 | End: 2024-03-08

## 2024-03-08 RX ORDER — CEFAZOLIN SODIUM 2 G/50ML
2000 SOLUTION INTRAVENOUS ONCE
Status: COMPLETED | OUTPATIENT
Start: 2024-03-08 | End: 2024-03-08

## 2024-03-08 RX ORDER — OXYCODONE HYDROCHLORIDE AND ACETAMINOPHEN 5; 325 MG/1; MG/1
1 TABLET ORAL EVERY 4 HOURS PRN
Qty: 10 TABLET | Refills: 0 | Status: SHIPPED | OUTPATIENT
Start: 2024-03-08

## 2024-03-08 RX ORDER — OXYCODONE HYDROCHLORIDE AND ACETAMINOPHEN 5; 325 MG/1; MG/1
1 TABLET ORAL EVERY 4 HOURS PRN
Qty: 15 TABLET | Refills: 0 | Status: SHIPPED | OUTPATIENT
Start: 2024-03-08 | End: 2024-03-08

## 2024-03-08 RX ORDER — DEXAMETHASONE SODIUM PHOSPHATE 10 MG/ML
INJECTION, SOLUTION INTRAMUSCULAR; INTRAVENOUS AS NEEDED
Status: DISCONTINUED | OUTPATIENT
Start: 2024-03-08 | End: 2024-03-08

## 2024-03-08 RX ORDER — HYDROMORPHONE HCL IN WATER/PF 6 MG/30 ML
0.2 PATIENT CONTROLLED ANALGESIA SYRINGE INTRAVENOUS
Status: DISCONTINUED | OUTPATIENT
Start: 2024-03-08 | End: 2024-03-08 | Stop reason: HOSPADM

## 2024-03-08 RX ORDER — SODIUM CHLORIDE, SODIUM LACTATE, POTASSIUM CHLORIDE, CALCIUM CHLORIDE 600; 310; 30; 20 MG/100ML; MG/100ML; MG/100ML; MG/100ML
INJECTION, SOLUTION INTRAVENOUS CONTINUOUS PRN
Status: DISCONTINUED | OUTPATIENT
Start: 2024-03-08 | End: 2024-03-08

## 2024-03-08 RX ORDER — ONDANSETRON 2 MG/ML
INJECTION INTRAMUSCULAR; INTRAVENOUS AS NEEDED
Status: DISCONTINUED | OUTPATIENT
Start: 2024-03-08 | End: 2024-03-08

## 2024-03-08 RX ORDER — PROPOFOL 10 MG/ML
INJECTION, EMULSION INTRAVENOUS AS NEEDED
Status: DISCONTINUED | OUTPATIENT
Start: 2024-03-08 | End: 2024-03-08

## 2024-03-08 RX ORDER — MIDAZOLAM HYDROCHLORIDE 2 MG/2ML
INJECTION, SOLUTION INTRAMUSCULAR; INTRAVENOUS AS NEEDED
Status: DISCONTINUED | OUTPATIENT
Start: 2024-03-08 | End: 2024-03-08

## 2024-03-08 RX ORDER — LIDOCAINE HYDROCHLORIDE 10 MG/ML
INJECTION, SOLUTION EPIDURAL; INFILTRATION; INTRACAUDAL; PERINEURAL AS NEEDED
Status: DISCONTINUED | OUTPATIENT
Start: 2024-03-08 | End: 2024-03-08

## 2024-03-08 RX ADMIN — PROPOFOL 60 MCG/KG/MIN: 10 INJECTION, EMULSION INTRAVENOUS at 10:24

## 2024-03-08 RX ADMIN — PROPOFOL 180 MG: 10 INJECTION, EMULSION INTRAVENOUS at 10:24

## 2024-03-08 RX ADMIN — DEXAMETHASONE SODIUM PHOSPHATE 10 MG: 10 INJECTION, SOLUTION INTRAMUSCULAR; INTRAVENOUS at 10:24

## 2024-03-08 RX ADMIN — PHENYLEPHRINE HYDROCHLORIDE 50 MCG/MIN: 10 INJECTION INTRAVENOUS at 10:24

## 2024-03-08 RX ADMIN — FENTANYL CITRATE 25 MCG: 50 INJECTION, SOLUTION INTRAMUSCULAR; INTRAVENOUS at 10:59

## 2024-03-08 RX ADMIN — ONDANSETRON 4 MG: 2 INJECTION INTRAMUSCULAR; INTRAVENOUS at 10:24

## 2024-03-08 RX ADMIN — FENTANYL CITRATE 25 MCG: 50 INJECTION, SOLUTION INTRAMUSCULAR; INTRAVENOUS at 10:36

## 2024-03-08 RX ADMIN — LIDOCAINE HYDROCHLORIDE 50 MG: 10 INJECTION, SOLUTION EPIDURAL; INFILTRATION; INTRACAUDAL at 10:24

## 2024-03-08 RX ADMIN — FENTANYL CITRATE 50 MCG: 50 INJECTION, SOLUTION INTRAMUSCULAR; INTRAVENOUS at 10:29

## 2024-03-08 RX ADMIN — SODIUM CHLORIDE, SODIUM LACTATE, POTASSIUM CHLORIDE, AND CALCIUM CHLORIDE: .6; .31; .03; .02 INJECTION, SOLUTION INTRAVENOUS at 10:59

## 2024-03-08 RX ADMIN — CEFAZOLIN SODIUM 2000 MG: 2 SOLUTION INTRAVENOUS at 10:19

## 2024-03-08 RX ADMIN — MIDAZOLAM HYDROCHLORIDE 2 MG: 1 INJECTION, SOLUTION INTRAMUSCULAR; INTRAVENOUS at 10:16

## 2024-03-08 RX ADMIN — CHLORHEXIDINE GLUCONATE 0.12% ORAL RINSE 15 ML: 1.2 LIQUID ORAL at 09:38

## 2024-03-08 RX ADMIN — SODIUM CHLORIDE, SODIUM LACTATE, POTASSIUM CHLORIDE, AND CALCIUM CHLORIDE: .6; .31; .03; .02 INJECTION, SOLUTION INTRAVENOUS at 10:13

## 2024-03-08 RX ADMIN — ACETAMINOPHEN 975 MG: 325 TABLET, FILM COATED ORAL at 09:38

## 2024-03-08 NOTE — DISCHARGE INSTR - AVS FIRST PAGE
History and Physical    Boy Sandrita Cano is a 1 Days male 8 lb 4.3 oz (3750 g) infant, delivered at Gestational Age: 39w0d on 2021.  Infant examined at approximately 23 hours of life.  Formula feeding and no concerns thus far.      MATERNAL INFORMATION:     Age, /Para, NIKHIL:   Information for the patient's mother:  Sandrita Cano BANDAR [[id number]]   34 year old          2021, by Last Menstrual Period       steroids during pregnancy: None     Information for the patient's mother:  Zenghanshyam Sandrita PORTILLO [[id number]]     Social History     Tobacco Use   • Smoking status: Never Smoker   • Smokeless tobacco: Never Used   Substance Use Topics   • Alcohol use: Not Currently     Alcohol/week: 1.0 - 2.0 standard drinks     Types: 1 - 2 Standard drinks or equivalent per week     Comment: none with pregnancy       Information for the patient's mother:  Rachael Sandrita PORTILLO [[id number]]     Past Medical History:   Diagnosis Date   • Anemia of mother in pregnancy, antepartum    • Chalazion     History of recurrent, bilateral chalazion.  She grew out of it.   • Chronic sinusitis     Frequent sinus infections, usually once a year.   • Circumscribed scleroderma     Of female  organs.  She was diagnosed and is managed by Cira Tobin MD.  Sandrita's Mother has the disease as well.   • Gastro-oesophageal reflux disease    • History of Gestational hypertension, third trimester 2020   • Irritable bowel syndrome     with constipation and diarrhea   • Miscarriage aug 2016    6 weeks, coming for D&C        Prenatal Labs:  Information for the patient's mother:  Zenghanshyam Sandrita PORTILLO [[id number]]     Recent Labs   Lab 20  1122   HIV Antigen/ Antibody Combo Screen Nonreactive   Hepatitis B Surface Antigen Negative   Treponema Pallidum Antibody, IgG and IgM Nonreactive   Rubella Antibody, IgG 134.7      Information for the patient's mother:  Sandrita Cano [[id number]]   No results for  Dr. Smith  Post-Operative Instructions    1. Take your prescribed medication as directed.   2. Upon arrival at home, lie down and elevate your surgical foot on 2 pillows.  3. Stay off your feet as much as possible for the first 24-48 hours. This is when your feet first swell and may become painful. After 48 hours you may begin limited walking following these restrictions:    Non-weight bearing to surgical foot    4. Drink large quantities of water. Consume no alcohol. Continue a well-balanced diet.  5. Report any unusual discomfort or fever to this office.  6. A limited amount of discomfort and swelling is to be expected. In some cases the skin may take on a bruised appearance. The surgical cleansing solution that was applied to your foot prior to the operation is dark in color and the operation site may appear to be oozing when it actually is not.  7. A slight amount of blood is to be expected, and is no cause for alarm. Do not remove the dressings. If there is active bleeding and if the bleeding persists, add additional gauze to the bandage, apply direct pressure, elevate your feet and call this office.  8. Do not get the dressings wet. As regular bathing may be inconvenient, sponge baths are recommended.   9. When anesthesia wears off and if any discomfort should be present, apply an ice pack directly over the operated area for 15 minute intervals for several hours or until the pain leaves. (USE IN EXCESS OF 15 MINUTES COULD CAUSE FROSTBITE). Do not use hot water bags or electric pads. A convenient icepack can be made by placing ice cubes in a plastic bag and covering this with a towel.  10. Take over-the-counter laxative for constipation, this is common with use of narcotic medications.      input(s): CULT, SDES in the last 8765 hours.     Prenatal U/S:  21:    IMPRESSION:   Single live intrauterine pregnancy of approximately 18 weeks 6 days based   on this study and 18 weeks 2 days based on the first ultrasound exam. No   gross fetal anomalies were identified.    MATERNAL IMM Hx:  COVID, TdaP and influenza received during this pregnancy.     GBS: Not Assessed - scheduled c-sxn Doses of antibiotics received: one at time of birth.    BIRTH HISTORY:     Delivery Method: , Low Transverse [251]   Rupture date & time: 2021 12:09 PM   Date & time of birth 2021 12:09 PM   Induction:       Complications/ Risks       Placenta appearance: Intact   Cord info/complications: 3 Vessels None       Delayed cord clamping: Yes   Indications for :     Presentation/position: Vertex         Forceps attempted? No     Vacuum attempted? No     Shoulder dystocia? No       INFORMATION     Resuscitation:  None          APGARS  One minute Five minutes   Skin color: 1  1    Heart rate: 2  2     Reflex: 2  2    Muscle tone: 2  2    Breathin  2    Totals:   9  9      Cord Blood/ Evaluation (CRD)  Recent Labs   Lab 21  1219   ABORHDABR O Rh Positive   DIGG Negative     Blood gases sent? No   Cord pH No results found     [place] hearing screen:  Refer bilaterally by time of this note.      Past Surgical History:   Procedure Laterality Date   • Circumcision baby N/A 2021        PHYSICAL EXAM     VITALS:   Visit Vitals  Pulse 148   Temp 99.3 °F (37.4 °C) (Axillary)   Resp 44   Ht 19\" (48.3 cm) Comment: Filed from Delivery Summary   Wt 3.795 kg   HC 37 cm (14.57\") Comment: Filed from Delivery Summary   BMI 16.29 kg/m²     Intake/Output       700 -  0659 700 -  0659    P.O. 144     Total Intake 144     Net +144           Urine Occurrence 4 x 1 x    Stool Occurrence 4 x           Birth Measurements:        Weight: 8 lb 4.3 oz (3750 g)        Length: 19\"         Head circumference: 37 cm    GENERAL:Baby Boy is an alert, vigorous male with appropriate behavior. He is in no acute distress.  SKIN: His skin is warm with normal turgor. The color of the skin is pink. There is no rash. There are no bruises or other signs of injury.  Hyperpigmented patches on buttocks and lower back.  Significant jaundice is not present.  HEAD: The head is atraumatic and normocephalic. The anterior fontanel is open and flat.  EYES: The conjunctivae appear normal with neither icterus nor subconjunctival hemorrhage.   Red reflexes are seen bilaterally.  EARS: Pinnae normal.  NOSE: There is no nasal flaring, nares patent bilaterally.  THROAT:  The oropharynx is normal.  There is no cleft of the palate.  NECK: Clavicles without crepitus.  TRUNK AND THORAX: There are no lesions on the trunk; there is no dimple over the presacral area. There are no retractions.  LUNGS: The lung fields are clear to auscultation.  HEART: The precordium is quiet. The heart rhythm is grossly regular. S1 and S2 are normal. There are no murmurs. Normal femoral pulses.  ABDOMEN: The umbilical cord stump is normal. There is not an umbilical hernia. The abdomen is flat and soft.   GENITALIA: normal male genitalia with bilateral descended testes , circ without active bleeding and tip pink, circ dressing in place.    RECTAL: anus patent  EXTREMITIES: Moving all 4 extremities. The hip exam is normal  . There are no hip clicks or clunks.    NEUROLOGIC: He displays normal tone throughout. He is not jittery.    Immunization History   Administered Date(s) Administered   • Hep B, adolescent or pediatric 2021        ASSESSMENT     Well 1 day old male infant.      Patient Active Problem List   Diagnosis   • Term  delivered by  section, current hospitalization   • Spotting, Persian         PLAN      Sepsis:    Risk Scores: Risk - Well Appearin.02; Risk - Equivocal: 0.2   Sepsis Classification: (most  recent) Well Appearing (21 0751)  Plan: Based on risk scores and a current classification of Well Appearing, routine vital signs. no CBC or blood cultures. No antibiotics.       Feeding:  Daisetta is currently being fed Formula only.  Decision prior to birth due to previous BF difficulty/discomfort/supply.  Mom content with her decision and notes less stress with bottle feeding experience.    I am aware that mother's feeding choice upon admission was the following:   Information for the patient's mother:  Sandrita Cano [[id number]]      There are no medical contraindications to exclusive breast milk feeding, and the benefits of exclusively breastfeeding were discussed/reinforced with the mother.    Routine  care.  Repeat hearing screen to be done prior to hospital d/c - no FH hearing loss.  Infant will be re-examined daily prior to hospital d/c - planned currently for 21.

## 2024-03-08 NOTE — OP NOTE
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]    Procedure(s) (LRB):  EXCISION EXOSTOSIS LEFT FOOT WITH EXCISION OF ADVENTITIOUS BURSA (Left)    Specimen(s):  * No specimens in log *    Estimated Blood Loss:   Minimal    Drains:  * No LDAs found *    Anesthesia Type:   Choice with 20 ml of 1% Lidocaine and 0.5% Bupivacaine in a 1:1 mixture    Hemostasis:  Pneumatic ankle tourniquet set at 250 mmHg for 34 mins  Direct compression, electrocautery    Materials:  * No implants in log *      Injectables:  none    Operative Findings:  Consistent with diagnosis  Bone spur resected from medial cuneiform bone and overlying overlying bursa     Complications:   none    Procedure and Technique:     Under mild sedation, the patient was brought into the operating room and placed on the operating table in the supine position. IV sedation was achieved by anesthesia team and a universal timeout was performed where all parties are in agreement of correct patient, correct procedure and correct site. A pneumatic tourniquet was then placed over the patient's left lower extremity with ample padding. A regional block was performed with local anesthetic.   The foot was then prepped and draped in the usual aseptic manner. An esmarch bandage was used to exsangunate the foot and the pneumatic tourniquet was then inflated to 250 mmHg.    Incision was made over osseous abnormality on the medial aspect of the left foot.  Blunt dissection was taken down to expose deep fascia which was incised.  Abductor hallucis brevis muscle belly was retracted plantarly and  "deep fascia was incised to expose and exostosis from the medial cuneiform.  This was resected with use of a sagittal saw.  Bursa was also palpable and was removed with pickup blade.  Site was irrigated with saline closed deep to superficial with Vicryl and nylon suture.  Foot was cleaned and dried and dressed with Xeroform, 4 x 4 gauze, Kerlix and Ace wrap.    The tourniquet was deflated and normal hyperemic response was noted to all digits. The patient tolerated the procedure and anesthesia well without immediate complications and transferred to PACU with vital signs stable.     Dr. Smith was present during the entire procedure and participated in all key aspects.    SIGNATURE: Halina Carlson DPM  DATE: March 8, 2024  TIME: 11:18 AM      Portions of the record may have been created with voice recognition software. Occasional wrong word or \"sound a like\" substitutions may have occurred due to the inherent limitations of voice recognition software. Read the chart carefully and recognize, using context, where substitutions have occurred.    "

## 2024-03-08 NOTE — DISCHARGE SUMMARY
Discharge Summary Outpatient Procedure Podiatry -   Jose Nix 53 y.o. 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      Provisions for Follow-Up Care/Important appointments:  See after visit summary for information related to follow-up care and any pertinent home health orders.      Discharge Medications:  See after visit summary for reconciled discharge medications provided to patient and family.

## 2024-03-08 NOTE — H&P
Unremarkable physical exam. OK to proceed to surgery.           Steven Saavedra MD  Anesthesiology

## 2024-03-11 ENCOUNTER — OFFICE VISIT (OUTPATIENT)
Dept: PODIATRY | Facility: CLINIC | Age: 54
End: 2024-03-11

## 2024-03-11 VITALS
HEIGHT: 74 IN | DIASTOLIC BLOOD PRESSURE: 79 MMHG | SYSTOLIC BLOOD PRESSURE: 115 MMHG | OXYGEN SATURATION: 99 % | HEART RATE: 54 BPM | BODY MASS INDEX: 31.2 KG/M2

## 2024-03-11 DIAGNOSIS — R22.42 FOOT MASS, LEFT: Primary | ICD-10-CM

## 2024-03-11 DIAGNOSIS — M89.8X7 EXOSTOSIS OF BONE OF FOOT: ICD-10-CM

## 2024-03-11 DIAGNOSIS — Z98.890 POSTOPERATIVE STATE: ICD-10-CM

## 2024-03-11 PROCEDURE — 99024 POSTOP FOLLOW-UP VISIT: CPT | Performed by: PODIATRIST

## 2024-03-11 NOTE — PROGRESS NOTES
Assessment/Plan:     The patient's clinical examination today is relatively benign.  The surgical incision is well coapted with all sutures intact.  There are no signs of infection noted.    The incision was cleansed with a Hibiclens scrub and redressed with a dry sterile dressing with Xeroform as a contact layer.  He can begin guarded weightbearing in a surgical shoe with crutches as needed for support.  He was fitted for a new surgical shoe today as the 1 dispensed to him after surgery was much too large and was presenting a fall risk.    Follow-up in 1 week as scheduled for his next postoperative dressing change.     Diagnoses and all orders for this visit:    Foot mass, left    Exostosis of bone of foot    Postoperative state          Subjective:     Patient ID: Jose Nix is a 53 y.o. male.    The patient presents today for follow-up status post excision of a painful bony exostosis from the plantar aspect of his left midfoot in conjunction with an adventitious bursal sac.  He is doing fairly well from postoperative pain standpoint.  At this point, he states he is only taking Tylenol for pain and it is well-managed.        Review of Systems   Constitutional: Negative.    HENT: Negative.     Eyes: Negative.    Respiratory: Negative.     Cardiovascular: Negative.    Endocrine: Negative.    Musculoskeletal: Negative.    Neurological: Negative.    Hematological: Negative.    Psychiatric/Behavioral: Negative.           Objective:     Physical Exam  Vitals reviewed.   Constitutional:       Appearance: Normal appearance.   HENT:      Head: Normocephalic and atraumatic.      Nose: Nose normal.   Eyes:      Conjunctiva/sclera: Conjunctivae normal.      Pupils: Pupils are equal, round, and reactive to light.   Cardiovascular:      Pulses:           Dorsalis pedis pulses are 2+ on the left side.        Posterior tibial pulses are 2+ on the left side.   Pulmonary:      Effort: Pulmonary effort is normal.   Feet:       Comments: The patient's clinical examination today is relatively benign.  The surgical incision is well coapted with all sutures intact.  There are no signs of infection noted.    Skin:     General: Skin is warm.      Capillary Refill: Capillary refill takes less than 2 seconds.   Neurological:      General: No focal deficit present.      Mental Status: He is alert and oriented to person, place, and time.   Psychiatric:         Mood and Affect: Mood normal.         Behavior: Behavior normal.         Thought Content: Thought content normal.

## 2024-03-19 DIAGNOSIS — F51.01 PRIMARY INSOMNIA: ICD-10-CM

## 2024-03-19 NOTE — TELEPHONE ENCOUNTER
1 [id number] 03/08/2024 03/08/2024 ACETAMINOPHEN 325 MG / oxyCODONE HYDROCHLORIDE 5 MG ORAL TABLET (Tablet) 10.0 1 5.0 MG/325.0 MG 75.0 [facility]. Commercial Insurance 0 / 0 [place]    1 [id number] 02/15/2024 01/23/2024 Zolpidem Tartrate (Tablet) 30.0 30 10 MG NA MINGO SHEA [facility]. Commercial Insurance 1 / 1 [place]    1 [id number] 01/23/2024 01/23/2024 Zolpidem Tartrate (Tablet) 30.0 30 10 MG NA MINGO SHEA [facility]. Commercial Insurance 0 / 1 [place]    1 [id number] 12/19/2023 12/19/2023 Zolpidem Tartrate (Tablet) 30.0 30 10 MG NA MINGO SHEA [facility] INC[facility] Commercial Insurance 0 / 0 [place]    1 [id number] 11/22/2023 11/22/2023 Zolpidem Tartrate (Tablet) 30.0 30 10 MG NA [facility]O SHEA [facility] Commercial Insurance 0 / 0 [place]    1 [id number] 10/20/2023 10/20/2023 Zolpidem Tartrate (Tablet) 30.0 30 10 MG NA [facility]IE [facility] Commercial Insurance 0 / 0 [place]    1 [id number] 09/22/2023 09/21/2023 Zolpidem Tartrate (Tablet) 30.0 30 10 MG NA MINGO SHEA [facility] [employer] Commercial Insurance 0 / 0 [place]    1 [id number] 08/24/2023 08/24/2023 Zolpidem Tartrate (Tablet) 30.0 30 10 MG NA MINGO SHEA [facility] Commercial Insurance 0 / 0 [place]    1 [id number] 07/24/2023 07/24/2023 Zolpidem Tartrate (Tablet) 30.0 30 10 MG NA JESSIE WALLACE [employer] Commercial Insurance 0 / 0 [place]    1 [id number] 06/09/2023 06/06/2023 Zolpidem Tartrate (Tablet) 30.0 30 10 MG NA MINGO SHEA [facility] [employer] Commercial Insurance 0 / 1 [place]       CHIEF COMPLAINT:   Swelling and pain in the  Face  Urinary frequency and burning  HISTORY OF PRESENT ILLNESS:   The patient is an 57 year old female. who noticed a small pimple in the nose for about 2-3 days, and then he noticed swelling   as well and pain. There is a honey colored discharge from the lesion and crusts. The patient denies any trauma or injury. Does not have any fever or chills. No rash. The patient denies any difficulty swallowing or pain with swallowing. No wheezes. No coughing, no chest pain or shortness of breath. No runny nose. The patient denies any trauma or injury. T  The pain is also dull, constant pain, and there is sometimes throbbing pain as well.   Also the patient has urinary symptoms including dysuria and frequency and urgency for 2 days no blood in the urine she has occasional incontinence    Past Medical History:   Diagnosis Date   • Allergic Rhinitis    • Arthritis    • Back pain     lower   • Cellulitis    • Chicken pox    • Colon polyps    • Colon polyps    • DCIS (ductal carcinoma in situ) of breast 7/31/2014    [facility]. Aug. 2014 coming for lumpectomy   • Diverticulosis    • Eczema    • Eczema    • Esophagitis    • Gluten intolerance 2003   • Hashimoto's thyroiditis 2006   • Hemorrhoids    • Hip pain 2013    right   • Hyperplastic colon polyp     multiple   • Hypothyroidism    • Iodine deficiency    • Lactose intolerance    • Middle insomnia    • Migraine     imitrex and zofran as needed-with aura   • OAB (overactive bladder)    • PONV (postoperative nausea and vomiting)    • Right wrist fracture    • Shoulder pain 2013    right   • Somatic dysfunction    • Thyroid nodule    • Urge and stress incontinence    • Vitamin D deficiency      Past Surgical History:   Procedure Laterality Date   • ----------MAMMOGRAM----------  06-    incomplete  Need right breast study   • BREAST BIOPSY  09/30/2014    right breast   • BREAST LUMPECTOMY  08/26/2014    right breast   •  COLONOSCOPY W BIOPSY  7-    tubular adenoma polyp, 5 yr recall    • COLONOSCOPY W/ BIOPSIES  11/04/2008    Two ascending colon polyp, minimal left-sided diverticulosis, otherwise normal colonoscopy ileoscopy; Bx: duodenum mucosa with no specific pathologic change, fragments ofadenomatous polyp, fragments of hyperplastic polyp   • COLONOSCOPY W/ BIOPSIES  09/12/2005    Polyps, Diverticulosis, Hemorrhoid; Bx: Tubular Adenoma, one fragment, Benign Hyperplastic Polyps   • COLONOSCOPY W/ BIOPSIES  07/30/2002    Diverticulosis, Hemorrhoids, Few Polyp, tubulovillous adenoma, multiple hyperplastic polyp   • ESOPHAGOGASTRODUODENOSCOPY  11/04/2008   • PAP SMEAR,ROUTINE  11/24/2009   • REMOVE TONSILS/ADENOIDS,<11 Y/O  1964    T & A   • TONSILLECTOMY AND ADENOIDECTOMY       Social History     Social History   • Marital status:      Spouse name: Phillip   • Number of children: 1   • Years of education: N/A     Occupational History   • homemaker      Social History Main Topics   • Smoking status: Never Smoker   • Smokeless tobacco: Never Used   • Alcohol use 0.0 oz/week     0 Standard drinks or equivalent per week      Comment: rarely   • Drug use: No   • Sexual activity: Yes     Partners: Male     Birth control/ protection: Diaphragm     Other Topics Concern   • Not on file     Social History Narrative    Menarche: age 13    Menopause:  Age 53.     Family History   Problem Relation Age of Onset   • Musculoskeletal Mother      scleroderma/raynauds   • Thyroid Mother      goiter removed   • GI Mother      cholecystectomy   • Genitourinary Mother      hysterectomy   • Cancer Father      lung   • Alcohol/Drug Father      alcohol and tobacco user   • Diabetes Paternal Grandfather      adult   • Diabetes Maternal Uncle      adult   • Diabetes Other      cousin   • Cancer Other      paternal GGfather, rectal cancer   • Cancer Maternal Grandfather      Skin cancer, dec'd 80s   • Cancer Maternal Grandmother      'female  cancer', dec'd 42   • GI Brother      colon polyps, ulcers   • Diabetes Brother      pre diabetes   • Heart disease Daughter      Valvular disease   • OTHER Daughter      Benign Hypermobility Syndrome     MEDICATIONS:   Reviewed and updated per epic.  ALLERGIES:   Reviewed and updated per epic.  IMMUNIZATIONS:   Up-to-date.     PHYSICAL EXAMINATION:   VITAL SIGNS: Blood pressure 118/76, pulse 88, temperature 98 °F (36.7 °C), temperature source Oral, resp. rate 16, height 5' 7\" (1.702 m), weight 77.6 kg, last menstrual period 12/26/2013.    GENERAL: The patient alert, oriented x3 in no acute distress.   CARDIOVASCULAR: Regular rate and rhythm. No murmurs, rubs or gallops.   LUNGS: Clear to auscultation. Normal respiratory effort.   ABDOMEN: Soft, nontender to palpation. Bowel sounds present. No hepatosplenomegaly. No hernias present.   NECK: No neck or supraclavicular lymphadenopathy. No thyromegaly. No jugular venous distention or bruits. No stiffness noted. No submandibular or anterior cervical lymphadenopathy.   PSYCHIATRIC: No depression.   EXTREMITIES: No edema or varicose veins.   SKIN:  In the face there is an area for about 1 cm, which is tender to touch and swollen, and there is honey colored discharge and small crust covers the area.   ASSESSMENT AND PLAN:   The patient is an 57 year old female. with rash,  impetigo. For that, I told the patient how to avoid spread of the infection and to put Neosporin cream 3 times a day over the affected area, and I started the patient on Azithromycin for 5 days.  Also for the patient UTI ordered UA which showed 1-5 white BCs moderate leukocytes trace and few bacteria empirically start the patient on nitrofurantoin

## 2024-03-19 NOTE — TELEPHONE ENCOUNTER
Reason for call:   [x] Refill   [] Prior Auth  [] Other:     Office:   [x] PCP/Provider -   [] Specialty/Provider -     Medication:  zolpidem (AMBIEN) 10 mg tablet    Dose/Frequency: Take 1 tablet (10 mg total) by mouth daily at bedtime as needed for sleep     Quantity: 30    Pharmacy: [id number] [address] [facility] [phone]    Does the patient have enough for 3 days?   [] Yes   [x] No - Send as HP to POD

## 2024-03-20 RX ORDER — ZOLPIDEM TARTRATE 10 MG/1
10 TABLET ORAL
Qty: 30 TABLET | Refills: 0 | Status: SHIPPED | OUTPATIENT
Start: 2024-03-20

## 2024-03-26 ENCOUNTER — OFFICE VISIT (OUTPATIENT)
Dept: PODIATRY | Facility: CLINIC | Age: 54
End: 2024-03-26

## 2024-03-26 VITALS
HEART RATE: 58 BPM | DIASTOLIC BLOOD PRESSURE: 73 MMHG | SYSTOLIC BLOOD PRESSURE: 123 MMHG | BODY MASS INDEX: 31.2 KG/M2 | OXYGEN SATURATION: 99 % | HEIGHT: 74 IN

## 2024-03-26 DIAGNOSIS — Z98.890 POSTOPERATIVE STATE: Primary | ICD-10-CM

## 2024-03-26 DIAGNOSIS — M89.8X7 EXOSTOSIS OF BONE OF FOOT: ICD-10-CM

## 2024-03-26 PROCEDURE — 99024 POSTOP FOLLOW-UP VISIT: CPT | Performed by: PODIATRIST

## 2024-03-26 NOTE — PROGRESS NOTES
Assessment/Plan:     The patient's clinical examination today is significant for a well coapted incision line to the plantar medial aspect of the left midfoot.  There are no signs of infection noted.  The sutures were removed today without complication.  There is minimal tenderness palpation of the postsurgical region.    The patient is doing well from a clinical standpoint.  The sutures were removed today.  He may return to a supportive shoe as tolerated.  Recommend follow-up in 3 to 4 weeks.     Diagnoses and all orders for this visit:    Postoperative state    Exostosis of bone of foot          Subjective:     Patient ID: Jose Nix is a 53 y.o. male.    The patient presents today for follow-up status post excision of a painful bony prominence to the plantar aspect of his left midfoot.  He is doing well from a postoperative pain standpoint.  He rates his pain at about a 1 to 1-1/2 out of 10 on the pain scale.        Review of Systems   Constitutional: Negative.    HENT: Negative.     Eyes: Negative.    Respiratory: Negative.     Cardiovascular: Negative.    Endocrine: Negative.    Musculoskeletal: Negative.    Neurological: Negative.    Hematological: Negative.    Psychiatric/Behavioral: Negative.           Objective:     Physical Exam  Vitals reviewed.   Constitutional:       Appearance: Normal appearance.   HENT:      Head: Normocephalic and atraumatic.      Nose: Nose normal.   Eyes:      Conjunctiva/sclera: Conjunctivae normal.      Pupils: Pupils are equal, round, and reactive to light.   Cardiovascular:      Pulses:           Dorsalis pedis pulses are 2+ on the left side.        Posterior tibial pulses are 2+ on the left side.   Pulmonary:      Effort: Pulmonary effort is normal.   Feet:      Comments: The patient's clinical examination today is significant for a well coapted incision line to the plantar medial aspect of the left midfoot.  There are no signs of infection noted.  The sutures were removed  today without complication.  There is minimal tenderness palpation of the postsurgical region.    Skin:     General: Skin is warm.      Capillary Refill: Capillary refill takes less than 2 seconds.   Neurological:      General: No focal deficit present.      Mental Status: He is alert and oriented to person, place, and time.   Psychiatric:         Mood and Affect: Mood normal.         Behavior: Behavior normal.         Thought Content: Thought content normal.

## 2024-04-12 ENCOUNTER — OFFICE VISIT (OUTPATIENT)
Dept: FAMILY MEDICINE CLINIC | Facility: CLINIC | Age: 54
End: 2024-04-12
Payer: COMMERCIAL

## 2024-04-12 VITALS
HEART RATE: 63 BPM | DIASTOLIC BLOOD PRESSURE: 78 MMHG | SYSTOLIC BLOOD PRESSURE: 120 MMHG | OXYGEN SATURATION: 98 % | HEIGHT: 74 IN | BODY MASS INDEX: 32.85 KG/M2 | WEIGHT: 256 LBS | TEMPERATURE: 97.7 F

## 2024-04-12 DIAGNOSIS — I10 PRIMARY HYPERTENSION: ICD-10-CM

## 2024-04-12 DIAGNOSIS — M54.2 CERVICALGIA: Primary | ICD-10-CM

## 2024-04-12 DIAGNOSIS — D69.3 CHRONIC ITP (IDIOPATHIC THROMBOCYTOPENIA) (HCC): ICD-10-CM

## 2024-04-12 DIAGNOSIS — M25.511 ACUTE PAIN OF RIGHT SHOULDER: ICD-10-CM

## 2024-04-12 DIAGNOSIS — I25.10 ARTERIOSCLEROTIC CARDIOVASCULAR DISEASE: ICD-10-CM

## 2024-04-12 PROCEDURE — 99214 OFFICE O/P EST MOD 30 MIN: CPT | Performed by: FAMILY MEDICINE

## 2024-04-12 RX ORDER — PREDNISONE 20 MG/1
TABLET ORAL
Qty: 12 TABLET | Refills: 0 | Status: SHIPPED | OUTPATIENT
Start: 2024-04-12

## 2024-04-12 NOTE — PROGRESS NOTES
Name: Jose Nix      : 1970      MRN: [id number]  Encounter Provider: Jurgen Mcqueen MD  Encounter Date: 2024   Encounter department: [facility]    Assessment & Plan     1. Cervicalgia  Assessment & Plan:  I reviewed with pt. Trial of prednisone. Refer to PT.  Recheck 3-4w if not improving - earlier if worse    Orders:  -     predniSONE 20 mg tablet; 3 tab po qd x 2 then 2 tab po qd x 2 then 1 tab po qd x 2  -     Ambulatory Referral to Physical Therapy; Future    2. Acute pain of right shoulder  Assessment & Plan:  ? Related to neck issues?  I reviewed with pt. Trial of prednisone. Refer to PT.  Recheck 3-4w if not improving - earlier if worse    Orders:  -     predniSONE 20 mg tablet; 3 tab po qd x 2 then 2 tab po qd x 2 then 1 tab po qd x 2  -     Ambulatory Referral to Physical Therapy; Future    3. Chronic ITP (idiopathic thrombocytopenia) (HCC)  Assessment & Plan:  Plt counts have been sl low but stable on previous testing. Recheck CBC. Recheck 6m      4. Primary hypertension  Assessment & Plan:  Well controlled. Cont present treatment. Monitor labs. Recheck 6m        5. Arteriosclerotic cardiovascular disease  Assessment & Plan:  Asymptomatic. Continue present care.  F/u with Cardio             Subjective     f/u multiple med issues  - pt notes 3-4wk hx of R shoulder pain that is worsening and now spreading down his arm to his elbow. He denies trauma or overuse. No neck pain. Has not played tennis since his L foot surgery in March  - pt states that his L foot has been improving but still has sl swelling  - not exercising since foot surgery. Denies CP, palpitations, lightheadedness or other CV symptoms with or without exertion  - pt denies any new GI or  complaints        Review of Systems   Constitutional: Negative.    HENT: Negative.     Eyes: Negative.    Respiratory: Negative.     Cardiovascular: Negative.    Gastrointestinal: Negative.   "  Genitourinary: Negative.    Musculoskeletal:  Positive for arthralgias. Negative for back pain, gait problem, joint swelling and myalgias.   Skin: Negative.    Neurological: Negative.    Psychiatric/Behavioral: Negative.         Past Medical History:   Diagnosis Date   • Acute angina (HCC)     per pt \"no recent episode--anxiety attack approx 4 years ago\"   • Allergic     seasonal   • Chronic ITP (idiopathic thrombocytopenia) (HCC)    • Coronary artery disease      cardiology- Dr Snyder   • Dental crown present     and Veneers   • Family history of diabetes mellitus in mother     \"pt does not know biological father or the history\"   • High blood pressure     NOT HYPERTENSION   • History of heart murmur in childhood    • Hyperlipidemia    • Low platelet count (HCC)     no longer seeing a hematologist   • Shortness of breath     \"generally with activity\"   • ST elevation (STEMI) myocardial infarction of unspecified site (HCC)     per pt \"technically no heart attack\"   • Valvular heart disease    • Wears glasses      Past Surgical History:   Procedure Laterality Date   • CARDIAC SURGERY      OPEN HEART QUAD BYPASS; RESOLVED: 2012   • CORONARY ANGIOPLASTY  02/21/2012    CORONARY ANGIOGRAPHY WITHOUT CONCOMITANT LEFT HEART CATHETERIZATION; 90% PROXIMAL LAD, 90% 1ST DIAG OSTIUM AND 95% PROXIMAL THIRD, 99% PROXIMAL RAMUS INTERMEDIUS   • CORONARY ANGIOPLASTY WITH STENT PLACEMENT  02/21/2012    4 VESSEL DISEASE TO HAVE CABG; MANAGED BY: SHABBIR SNYDER   • CORONARY ARTERY BYPASS GRAFT      LAST ASSESSED: 11/17/17--metal wires in sternum for closure   • CORONARY ARTERY BYPASS GRAFT  02/29/2012    CABG X4 (SVG TO 1ST DIAGNOL, SVG TO 3RD OBTUSE MARGINAL, FREE RADIAL ARTERY TO RAMUS INTERMEDIUS, SULLIVAN TO LAD) BY DR. GUTIERREZ 2012    • FOOT SURGERY Right     bone spur   • CO PRTL EXC B1 TARSAL/METAR B1 XCP TALUS/CALCANEUS Left 3/8/2024    Procedure: EXCISION EXOSTOSIS LEFT FOOT WITH EXCISION OF ADVENTITIOUS BURSA;  " Surgeon: Khoa Smith DPM;  Location: [facility] OR;  Service: Podiatry   • TONSILLECTOMY     • VASECTOMY  10/27/2015    SURGERY VAS DEFERENS; MANAGED BY: LEILANI HEREDIA; LAST ASSESSED: 10/27/15   • WISDOM TOOTH EXTRACTION       Family History   Problem Relation Age of Onset   • Coronary artery disease Mother    • Heart disease Mother    • Hypertension Mother      Social History     Socioeconomic History   • Marital status: /Civil Union     Spouse name: None   • Number of children: 2   • Years of education: None   • Highest education level: None   Occupational History   • Occupation: TRAVEL    Tobacco Use   • Smoking status: Never   • Smokeless tobacco: Never   Vaping Use   • Vaping status: Never Used   Substance and Sexual Activity   • Alcohol use: Yes     Alcohol/week: 3.0 standard drinks of alcohol     Types: 3 Cans of beer per week     Comment: BEING A SOCIAL DRINKER: <= 3 DRINK/WEEK   • Drug use: Never   • Sexual activity: None     Comment: defer   Other Topics Concern   • None   Social History Narrative    NO DAILY COFFEE CONSUMPTION (___CUPS/DAY)    NO DAILY COLA CONSUMPTION (___CANS/DAY)    DAILY TEA CONSUMPTION (___CUPS/DAY)     Social Determinants of Health     Financial Resource Strain: Not on file   Food Insecurity: Not on file   Transportation Needs: Not on file   Physical Activity: Not on file   Stress: Not on file   Social Connections: Not on file   Intimate Partner Violence: Not on file   Housing Stability: Not on file     Current Outpatient Medications on File Prior to Visit   Medication Sig   • Acetaminophen 500 MG Take by mouth every 6 (six) hours as needed for mild pain   • aspirin 81 mg chewable tablet Chew 1 tablet daily   • metoprolol succinate (TOPROL-XL) 25 mg 24 hr tablet TAKE 1 TABLET DAILY (Patient taking differently: Take 25 mg by mouth every evening)   • Multiple Vitamins-Minerals (MULTIVITAMIN WITH MINERALS) tablet Take 2 tablets by mouth daily at  "bedtime   • rosuvastatin (CRESTOR) 20 MG tablet Take 1 tablet (20 mg total) by mouth daily (Patient taking differently: Take 20 mg by mouth every evening)     No Known Allergies  Immunization History   Administered Date(s) Administered   • COVID-19 PFIZER VACCINE 0.3 ML IM 02/25/2021, 03/18/2021, 11/11/2021   • COVID-19 Pfizer Vac BIVALENT John-sucrose 12 Yr+ IM 10/20/2022   • Fluzone Split Quad 0.5 mL 10/28/2015, 11/04/2016   • INFLUENZA 11/08/2014, 10/28/2015, 11/04/2016, 10/04/2018, 09/30/2022   • Influenza Injectable, MDCK, Preservative Free, Quadrivalent, 0.5 mL 11/25/2019, 10/15/2020   • Influenza Quadrivalent Preservative Free 3 years and older IM 10/28/2015, 11/04/2016   • Influenza Quadrivalent, 6-35 Months IM 09/18/2017   • Influenza, injectable, quadrivalent, preservative free 0.5 mL 10/09/2023   • Influenza, recombinant, quadrivalent,injectable, preservative free 09/30/2022       Objective     /78 (BP Location: Left arm, Patient Position: Sitting, Cuff Size: Adult)   Pulse 63   Temp 97.7 °F (36.5 °C)   Ht 6' 2\" (1.88 m)   Wt 116 kg (256 lb)   SpO2 98%   BMI 32.87 kg/m²     Physical Exam  Vitals reviewed.   HENT:      Head: Normocephalic.      Right Ear: Tympanic membrane, ear canal and external ear normal.      Left Ear: Tympanic membrane, ear canal and external ear normal.      Mouth/Throat:      Mouth: Mucous membranes are moist.   Eyes:      Extraocular Movements: Extraocular movements intact.      Conjunctiva/sclera: Conjunctivae normal.      Pupils: Pupils are equal, round, and reactive to light.   Neck:      Comments: Discomfort in R shoulder with posterior flexion with R rotation  Cardiovascular:      Rate and Rhythm: Normal rate and regular rhythm.   Pulmonary:      Effort: Pulmonary effort is normal.   Abdominal:      General: There is no distension.      Palpations: There is no mass.      Tenderness: There is no abdominal tenderness.   Musculoskeletal:         General: Tenderness " present. No deformity.      Cervical back: No tenderness.      Right lower leg: No edema.      Left lower leg: No edema.      Comments: R shoulder with neck movement as above   Lymphadenopathy:      Cervical: No cervical adenopathy.   Skin:     General: Skin is warm.      Capillary Refill: Capillary refill takes less than 2 seconds.   Neurological:      General: No focal deficit present.      Mental Status: He is alert and oriented to person, place, and time.      Cranial Nerves: No cranial nerve deficit.      Sensory: No sensory deficit.      Motor: No weakness.      Gait: Gait normal.      Deep Tendon Reflexes: Reflexes normal.   Psychiatric:         Mood and Affect: Mood normal.       Jurgen Mcqueen MD

## 2024-04-15 DIAGNOSIS — F51.01 PRIMARY INSOMNIA: ICD-10-CM

## 2024-04-15 RX ORDER — ZOLPIDEM TARTRATE 10 MG/1
10 TABLET ORAL
Qty: 30 TABLET | Refills: 0 | Status: SHIPPED | OUTPATIENT
Start: 2024-04-15

## 2024-04-15 NOTE — TELEPHONE ENCOUNTER
Reason for call:   [x] Refill   [] Prior Auth  [] Other:     Office:   [x] PCP/Provider -   [] Specialty/Provider -     Medication: zolpidem (AMBIEN) 10 mg tablet     Dose/Frequency:  Take 1 tablet (10 mg total) by mouth daily at bedtime as needed for sleep     Quantity: 30    Pharmacy[id number] [address] [facility] [phone]     Does the patient have enough for 3 days?   [x] Yes   [] No - Send as HP to POD

## 2024-04-15 NOTE — TELEPHONE ENCOUNTER
1 [id number] 03/20/2024 03/20/2024 Zolpidem Tartrate (Tablet) 30.0 30 10 MG NA MINGO SHEA [facility] [employer] [employer] Commercial Insurance 0 / 0 [place]    1 [id number] 03/08/2024 03/08/2024 oxyCODONE HYDROCHLORIDE 5 MG ORAL TABLET/ACETAMINOPHEN 325 MG (Tablet) 10.0 1 5.0 MG/325.0 MG 75.0 [facility] [employer]. Commercial Insurance 0 / 0 [place]    1 [id number] 02/15/2024 01/23/2024 Zolpidem Tartrate (Tablet) 30.0 30 10 MG NA MINGO SHEA [facility] [employer] [employer] Commercial Insurance 1 / 1 [place]    1 [id number] 01/23/2024 01/23/2024 Zolpidem Tartrate (Tablet) 30.0 30 10 MG NA MINGO SHEA [facility] [employer] Commercial Insurance 0 / 1 [place]    1 [id number] 12/19/2023 12/19/2023 Zolpidem Tartrate (Tablet) 30.0 30 10 MG NA MINGO SHEA [facility] [employer] [employer] Commercial Insurance 0 / 0 [place]    1 [id number] 11/22/2023 11/22/2023 Zolpidem Tartrate (Tablet) 30.0 30 10 MG NA [facility]O SHEA [facility] [employer] Commercial Insurance 0 / 0 [place]    1 [id number] 10/20/2023 10/20/2023 Zolpidem Tartrate (Tablet) 30.0 30 10 MG NA JESSIE WALLACE [facility] [employer] Commercial Insurance 0 / 0 [place]    1 [id number] 09/22/2023 09/21/2023 Zolpidem Tartrate (Tablet) 30.0 30 10 MG NA MINGO SHEA [facility] [employer] [employer] Commercial Insurance 0 / 0 [place]    1 [id number] 08/24/2023 08/24/2023 Zolpidem Tartrate (Tablet) 30.0 30 10 MG NA MINGO SHEA [facility] [email or web address] Commercial Insurance 0 / 0 [place]    1 [id number] 07/24/2023 07/24/2023 Zolpidem Tartrate (Tablet) 30.0 30 10 MG NA JESSIE WALLACE [facility]

## 2024-04-16 PROBLEM — M54.2 CERVICALGIA: Status: ACTIVE | Noted: 2024-04-16

## 2024-04-16 NOTE — ASSESSMENT & PLAN NOTE
? Related to neck issues?  I reviewed with pt. Trial of prednisone. Refer to PT.  Recheck 3-4w if not improving - earlier if worse

## 2024-04-16 NOTE — ASSESSMENT & PLAN NOTE
I reviewed with pt. Trial of prednisone. Refer to PT.  Recheck 3-4w if not improving - earlier if worse

## 2024-04-29 ENCOUNTER — EVALUATION (OUTPATIENT)
Dept: PHYSICAL THERAPY | Facility: CLINIC | Age: 54
End: 2024-04-29
Payer: COMMERCIAL

## 2024-04-29 DIAGNOSIS — M54.2 CERVICALGIA: Primary | ICD-10-CM

## 2024-04-29 DIAGNOSIS — M25.511 ACUTE PAIN OF RIGHT SHOULDER: ICD-10-CM

## 2024-04-29 PROCEDURE — 97112 NEUROMUSCULAR REEDUCATION: CPT

## 2024-04-29 PROCEDURE — 97161 PT EVAL LOW COMPLEX 20 MIN: CPT

## 2024-04-29 NOTE — PROGRESS NOTES
PT Evaluation     Today's date: 2024  Patient name: Jose Nix  : 1970  MRN: [id number]  Referring provider: Tl Mcqueen*  Dx:   Encounter Diagnosis     ICD-10-CM    1. Cervicalgia  M54.2 Ambulatory Referral to Physical Therapy      2. Acute pain of right shoulder  M25.511 Ambulatory Referral to Physical Therapy                     Assessment  Assessment details:     Impairment/Problem List:  1. Decreased c/s AROM in all planes  2. RUE pain with ADLs     Jose Nix is a 53 y.o. male who presents with RUE pain. Their clinical presentation aligns with cervical radiculopathy. Unclear directional preference at this time, seems to response well to contralateral side bending. Assess response nv. Jose Nix is limited with all ADLs secondary to the impairments stated above. No further referral appears necessary at this time based upon examination results. Prognosis is good given POC/HEP compliance.     Comparable signs:  1. Symptom location    Understanding of Dx/Px/POC: good   Prognosis: good    Goals  Short Term Goals:  Pt will report decreased levels of pain by at least 2 subjective ratings in 4 weeks  Pt will demonstrate improved ROM by at least 10 degrees in 4 weeks  Pt will demonstrate improved strength by ½ grade in 4 weeks  Pt will be able to work for 3 hours without pain in 4 weeks    Long Term Goals:  Pt will be independent with HEP in 8 weeks  Pt will be functional with all ADL's in 8 weeks  Pt will achieve their predicted FOTO score in 8 weeks  Pt will be able to sleep undisturbed by pain for 8 hours in 8 weeks      Plan  Patient would benefit from: skilled physical therapy  Referral necessary: No  Planned modality interventions: low level laser therapy  Planned therapy interventions: abdominal trunk stabilization, IASTM, joint mobilization, manual therapy, nerve gliding, neuromuscular re-education, balance, balance/weight bearing training, body mechanics training, breathing  training, patient education, postural training, strengthening, stretching, therapeutic activities, therapeutic exercise, functional ROM exercises, gait training, graded activity, graded exercise and home exercise program  Frequency: 2x week  Duration in weeks: 12  Plan of Care beginning date: 2024  Plan of Care expiration date: 2024  Treatment plan discussed with: patient        Subjective Evaluation    History of Present Illness  Mechanism of injury: Just happened while he was sleeping, usually a side sleeper but cannot sleep on his side rn because of the symptoms  Onset Gradual/Immediate: immediate  Timeframe: 6 weeks  Pain Location/Descriptors: throbbing pain  Constant/Intermittent: constant  Multiple pain locations related Y/N: Y  Symptoms changed since onset Y/N: Y, initially superior medial portion of R shoulder blade, now spreading distally to lateral aspect of elbow near extensor wad  Symptoms unchanged/improved/worsening since onset: unchanged  Aggravating: tennis, carrying shoulder harness  Easing: heat, Advil when he is willing to take it, Tylenol does not help much  AM/PM Pattern: progressively worsening throughout the day, painful at night when trying to sleep     Denies 5d's, 3n's. Pt denies unrelenting night pain, unexplained weight changes, bowel/bladder symptoms, saddle region numbness, ataxia.    Patient Goals  Patient goals for therapy: decreased pain, increased motion, independence with ADLs/IADLs, increased strength and return to sport/leisure activities    Pain  Current pain ratin  At best pain rating: 3  At worst pain ratin    Social Support    Employment status: working (Mostly seated desk work)  Hand dominance: right    Treatments  Previous treatment: medication (Steroid- did not help much)        Objective     Postural Observations  Seated posture: poor  Standing posture: poor    Additional Postural Observation Details  Reclined posture with thoracic kyphosis and mild to  moderate forward head position.     Neurological Testing     Reflexes   Left   Biceps (C5/C6): trace (1+)  Brachioradialis (C6): trace (1+)  Triceps (C7): trace (1+)    Right   Biceps (C5/C6): trace (1+)  Brachioradialis (C6): trace (1+)  Triceps (C7): trace (1+)    Additional Neurological Details  LE dermatomes and myotomes symmetrical throughout    Active Range of Motion   Cervical/Thoracic Spine       Cervical  Subcranial protraction:  WFL   Subcranial retraction:  with pain   Restriction level: minimal  Flexion:  with pain Restriction level: minimal  Extension:  with pain Restriction level: moderate  Left lateral flexion:  with pain Restriction level: moderate  Right lateral flexion:  with pain Restriction level moderate  Left rotation:  with pain Restriction level: minimal  Right rotation:  with pain Restriction level: minimal  Mechanical Assessment    Cervical    Seated Protrusion: repeated movements   Pain intensity: worse  Seated retraction: repeated movements   Pain intensity: worse  Seated Flexion:  repeated movements  Pain intensity: worse  Seated Extension: repeated movements  Pain intensity: worse  Seated Left Sidebend: repeated movements   Pain intensity: better  Seated right sidebend: repeated movements  Pain intensity: worse  Supine retractation: repeated movements  Pain intensity: worse    Thoracic      Lumbar      Strength/Myotome Testing   Cervical Spine     Left   Interossei strength (t1): 4+  Levator scapulae (C4): 4+    Right   Interossei strength (t1): 4+  Levator scapulae (C4): 4+    Left Shoulder     Planes of Motion   Abduction: 4+   External rotation at 0°: 4+     Isolated Muscles   Levator scapulae: 4+     Right Shoulder     Planes of Motion   Abduction: 4+   External rotation at 0°: 4+     Isolated Muscles   Levator scapulae: 4+     Left Elbow   Flexion: 4+  Extension: 4+    Right Elbow   Flexion: 4+  Extension: 4+    Left Wrist/Hand   Wrist extension: 4+  Wrist flexion: 4+  Thumb  extension: 4+    Right Wrist/Hand   Wrist extension: 4+  Wrist flexion: 4+  Thumb extension: 4+    Tests   Cervical   Positive vertical compression and cervical distraction test.    Right   Positive Spurling's Test A.     Lumbar   Positive vertical compression .              Precautions: Cardiac hx, 6/7 weeks s/p L foot surgery no precautions      Manuals 4/29                                                                Neuro Re-Ed             Posture edu 10 min                                                                                          Ther Ex             L side bending AROM 1x10 every 2 hours                                                                                                       Ther Activity                                       Gait Training                                       Modalities

## 2024-05-03 ENCOUNTER — OFFICE VISIT (OUTPATIENT)
Dept: PHYSICAL THERAPY | Facility: CLINIC | Age: 54
End: 2024-05-03
Payer: COMMERCIAL

## 2024-05-03 DIAGNOSIS — M25.511 ACUTE PAIN OF RIGHT SHOULDER: ICD-10-CM

## 2024-05-03 DIAGNOSIS — M54.2 CERVICALGIA: Primary | ICD-10-CM

## 2024-05-03 PROCEDURE — 97140 MANUAL THERAPY 1/> REGIONS: CPT

## 2024-05-03 PROCEDURE — 97110 THERAPEUTIC EXERCISES: CPT

## 2024-05-03 NOTE — PROGRESS NOTES
Daily Note     Today's date: 5/3/2024  Patient name: Jose Nix  : 1970  MRN: [id number]  Referring provider: Tl Mcqueen*  Dx:   Encounter Diagnosis     ICD-10-CM    1. Cervicalgia  M54.2       2. Acute pain of right shoulder  M25.511                      Subjective: Pt reports he feels slightly better with L side bending at home.       Objective: See treatment diary below      Assessment: Re-assessed sagittal plane movements today. Pt continued to have production of symtpoms at end range of retraction both sitting and supine. Uncertain if it remained worse after. Performed L side bending with patient over pressure and with median nerve glide for mobilization.       Plan: Continue per plan of care.  Progress treatment as tolerated.       Precautions: Cardiac hx, 6/7 weeks s/p L foot surgery no precautions      Manuals 4/29 5/3           R periscapular TPR  8 min                                                  Neuro Re-Ed             Posture edu 10 min            L side bendin with self OP and median n glide (tray exercise)  3x10                                                                            Ther Ex             L side bending AROM 1x10 every 2 hours 3x10            L side bending with self OP  3x10                                                                                         Ther Activity                                       Gait Training                                       Modalities

## 2024-05-07 ENCOUNTER — OFFICE VISIT (OUTPATIENT)
Dept: PHYSICAL THERAPY | Facility: CLINIC | Age: 54
End: 2024-05-07
Payer: COMMERCIAL

## 2024-05-07 DIAGNOSIS — M54.2 CERVICALGIA: Primary | ICD-10-CM

## 2024-05-07 DIAGNOSIS — M25.511 ACUTE PAIN OF RIGHT SHOULDER: ICD-10-CM

## 2024-05-07 PROCEDURE — 97110 THERAPEUTIC EXERCISES: CPT

## 2024-05-07 PROCEDURE — 97140 MANUAL THERAPY 1/> REGIONS: CPT

## 2024-05-10 ENCOUNTER — OFFICE VISIT (OUTPATIENT)
Dept: PHYSICAL THERAPY | Facility: CLINIC | Age: 54
End: 2024-05-10
Payer: COMMERCIAL

## 2024-05-10 DIAGNOSIS — M25.511 ACUTE PAIN OF RIGHT SHOULDER: ICD-10-CM

## 2024-05-10 DIAGNOSIS — M54.2 CERVICALGIA: Primary | ICD-10-CM

## 2024-05-10 PROCEDURE — 97110 THERAPEUTIC EXERCISES: CPT

## 2024-05-10 PROCEDURE — 97140 MANUAL THERAPY 1/> REGIONS: CPT

## 2024-05-10 NOTE — PROGRESS NOTES
"Daily Note     Today's date: 5/10/2024  Patient name: Jose Nix  : 1970  MRN: [id number]  Referring provider: Tl Mcqueen*  Dx:   Encounter Diagnosis     ICD-10-CM    1. Cervicalgia  M54.2       2. Acute pain of right shoulder  M25.511                          Subjective: Pt reports pain in his arm is less, but he has more tingling in his arm.       Objective: See treatment diary below      Assessment: Pt seemed to centralize somewhat with supine retraction into towel, he states \"it hurts so bad here, that I dont even feel it in my arm now\" pointing to his shoulder blade. Added to HEP. Pt continues to have good response to thoracic extension unloaded and TPR.       Plan: Continue per plan of care.  Progress treatment as tolerated.       Precautions: Cardiac hx, 6/7 weeks s/p L foot surgery no precautions      Manuals 4/29 5/3 5/7 5/10         R periscapular TPR  8 min 8 min 8 min         Prone PA thoracic spine mobilizations T12-C7   2x30 ea 2x30 ea                                   Neuro Re-Ed             Posture edu 10 min            L side bendin with self OP and median n glide (tray exercise)  3x10 3x10                                                                           Ther Ex             Supine thoracic extension over foam roll   2x10 2x10         Self TPR education   5 min          Supine retraction off edge of bed with towel    2x10                                                                                                    Ther Activity                                       Gait Training                                       Modalities                                            "

## 2024-05-13 ENCOUNTER — OFFICE VISIT (OUTPATIENT)
Dept: PHYSICAL THERAPY | Facility: CLINIC | Age: 54
End: 2024-05-13
Payer: COMMERCIAL

## 2024-05-13 DIAGNOSIS — M25.511 ACUTE PAIN OF RIGHT SHOULDER: ICD-10-CM

## 2024-05-13 DIAGNOSIS — M54.2 CERVICALGIA: Primary | ICD-10-CM

## 2024-05-13 DIAGNOSIS — E78.2 MIXED HYPERLIPIDEMIA: ICD-10-CM

## 2024-05-13 PROCEDURE — 97140 MANUAL THERAPY 1/> REGIONS: CPT

## 2024-05-13 PROCEDURE — 97110 THERAPEUTIC EXERCISES: CPT

## 2024-05-13 NOTE — PROGRESS NOTES
Daily Note     Today's date: 2024  Patient name: Jose Nix  : 1970  MRN: [id number]  Referring provider: Tl Mcqueen*  Dx:   Encounter Diagnosis     ICD-10-CM    1. Cervicalgia  M54.2       2. Acute pain of right shoulder  M25.511                          Subjective: Pt reports significant improvement since Friday. Tingling is into upper arm and no pain.       Objective: See treatment diary below      Assessment: Pt has had improvement since adding supine retraction. However has poor form with supine retraction today, including upper cervical extension. Poor form did cause pain. Pain improved with cueing. Pt educated to continue same HEP assess response nv.       Plan: Continue per plan of care.  Progress treatment as tolerated.       Precautions: Cardiac hx, 6/ weeks s/p L foot surgery no precautions      Manuals 4/29 5/3 5/7 5/10 5/13        R periscapular TPR  8 min 8 min 8 min 8 min        Prone PA thoracic spine mobilizations T12-C7   2x30 ea 2x30 ea 2x30 ea                                  Neuro Re-Ed             Posture edu 10 min            L side bendin with self OP and median n glide (tray exercise)  3x10 3x10                                                                           Ther Ex             Supine thoracic extension over foam roll   2x10 2x10 2x10        Self TPR education   5 min          Supine retraction off edge of bed with towel    2x10 2x10                                                                                                   Ther Activity                                       Gait Training                                       Modalities

## 2024-05-14 RX ORDER — ROSUVASTATIN CALCIUM 20 MG/1
20 TABLET, COATED ORAL DAILY
Qty: 90 TABLET | Refills: 1 | Status: SHIPPED | OUTPATIENT
Start: 2024-05-14

## 2024-05-15 DIAGNOSIS — F51.01 PRIMARY INSOMNIA: ICD-10-CM

## 2024-05-15 NOTE — TELEPHONE ENCOUNTER
Reason for call:   [x] Refill   [] Prior Auth  [] Other:     Office:   [x] PCP/Provider - [facility] Bernie   [] Specialty/Provider -     Medication:   zolpidem (AMBIEN) 10 mg tablet - Take 1 tablet (10 mg total) by mouth daily at bedtime as needed for sleep     Pharmacy:   [facility] #[id number] [facility] [address]     Does the patient have enough for 3 days?   [x] Yes   [] No - Send as HP to POD

## 2024-05-16 RX ORDER — ZOLPIDEM TARTRATE 10 MG/1
10 TABLET ORAL
Qty: 30 TABLET | Refills: 0 | Status: SHIPPED | OUTPATIENT
Start: 2024-05-16

## 2024-05-16 NOTE — TELEPHONE ENCOUNTER
1 [id number] 04/18/2024 04/15/2024 Zolpidem Tartrate (Tablet) 30.0 30 10 MG NA MINGO SHEA [facility]. Commercial Insurance 0 / 0 [place]    1 [id number] 03/20/2024 03/20/2024 Zolpidem Tartrate (Tablet) 30.0 30 10 MG [facility] Commercial Insurance 0 / 0 PA    1 [id number] 03/08/2024 03/08/2024 oxyCODONE HYDROCHLORIDE 5 MG ORAL TABLET/ACETAMINOPHEN 325 MG (Tablet) 10.0 1 5.0 MG/325.0 MG 75.0 [facility]. Commercial Insurance 0 / 0 PA    1 [id number] 02/15/2024 01/23/2024 Zolpidem Tartrate (Tablet) 30.0 30 10 MG NA [facility]O SHE[facility] Commercial Insurance 1 / 1 PA    1 [id number] 01/23/2024 01/23/2024 Zolpidem Tartrate (Tablet) 30.0 30 10 MG NA MINGO SHEA [facility] Commercial Insurance 0 / 1 PA    1 [id number] 12/19/2023 12/19/2023 Zolpidem Tartrate (Tablet) 30.0 30 10 MG NA [facility]O [facility] Commercial Insurance 0 / 0 PA    1 [id number] 11/22/2023 11/22/2023 Zolpidem Tartrate (Tablet) 30.0 30 10 MG NA [facility]O SHE[facility] Commercial Insurance 0 / 0 PA    1 [id number] 10/20/2023 10/20/2023 Zolpidem Tartrate (Tablet) 30.0 30 10 MG NA JESSIE WALLACE [facility] Commercial Insurance 0 / 0 PA    1 [id number] 09/22/2023 09/21/2023 Zolpidem Tartrate (Tablet) 30.0 30 10 MG NA MINGO SHEA, GREG [facility] Commercial Insurance 0 / 0 PA    1 [id number] 08/24/2023 08/24/2023 Zolpidem Tartrate (Tablet) 30.0 30 10 MG NA MINGO SHEA POGODZINSKI [facility] [place] [facility] Com

## 2024-05-17 ENCOUNTER — OFFICE VISIT (OUTPATIENT)
Dept: PHYSICAL THERAPY | Facility: CLINIC | Age: 54
End: 2024-05-17
Payer: COMMERCIAL

## 2024-05-17 DIAGNOSIS — M54.2 CERVICALGIA: Primary | ICD-10-CM

## 2024-05-17 DIAGNOSIS — M25.511 ACUTE PAIN OF RIGHT SHOULDER: ICD-10-CM

## 2024-05-17 PROCEDURE — 97140 MANUAL THERAPY 1/> REGIONS: CPT

## 2024-05-17 PROCEDURE — 97110 THERAPEUTIC EXERCISES: CPT

## 2024-05-17 NOTE — PROGRESS NOTES
Daily Note     Today's date: 2024  Patient name: Jose Nix  : 1970  MRN: [id number]  Referring provider: Tl Mcqueen*  Dx:   Encounter Diagnosis     ICD-10-CM    1. Cervicalgia  M54.2       2. Acute pain of right shoulder  M25.511           Start Time: 0830  Stop Time: 0900  Total time in clinic (min): 30 minutes    Subjective: Pt report pain along right medial scap is elevated from competing yard work earlier this week. He reports otherwise pain has been improving.       Objective: See treatment diary below      Assessment: Jose tolerated treatment well with consistent cuing throughout. TE's were performed with the same resistance and reps. New TE's were demonstrated with proper technique, and tolerated well. Following treatment, the patient   demonstrated fatigue post treatment, exhibited good technique with therapeutic exercises, and would benefit from continued PT. Patient notes sig improvement in Upper trap, midtrap, Right rhomboid pain after manual technique.         Plan: Continue per plan of care.      Precautions: Cardiac hx, 6/ weeks s/p L foot surgery no precautions      Manuals 4/29 5/3 5/7 5/10 5/13 5/27       R periscapular TPR  8 min 8 min 8 min 8 min 8 mi        Prone PA thoracic spine mobilizations T12-C7   2x30 ea 2x30 ea 2x30 ea 2x30 ea                                 Neuro Re-Ed             Posture edu 10 min            L side bendin with self OP and median n glide (tray exercise)  3x10 3x10                                                                           Ther Ex             Supine thoracic extension over foam roll   2x10 2x10 2x10 2x10       Self TPR education   5 min          Supine retraction off edge of bed with towel    2x10 2x10 2x10       Post shoulder rolls      2x10                                                                                     Ther Activity                                       Gait Training                                        Modalities

## 2024-05-20 ENCOUNTER — OFFICE VISIT (OUTPATIENT)
Dept: PHYSICAL THERAPY | Facility: CLINIC | Age: 54
End: 2024-05-20
Payer: COMMERCIAL

## 2024-05-20 DIAGNOSIS — M54.2 CERVICALGIA: Primary | ICD-10-CM

## 2024-05-20 DIAGNOSIS — M25.511 ACUTE PAIN OF RIGHT SHOULDER: ICD-10-CM

## 2024-05-20 PROCEDURE — 97110 THERAPEUTIC EXERCISES: CPT

## 2024-05-20 PROCEDURE — 97140 MANUAL THERAPY 1/> REGIONS: CPT

## 2024-05-20 NOTE — PROGRESS NOTES
Daily Note     Today's date: 2024  Patient name: Jose Nix  : 1970  MRN: [id number]  Referring provider: Tl Mcqueen*  Dx:   Encounter Diagnosis     ICD-10-CM    1. Cervicalgia  M54.2       2. Acute pain of right shoulder  M25.511                      Subjective: Pt report pain along right medial scap is elevated from competing yard work earlier this week. He reports otherwise pain has been improving.       Objective: See treatment diary below      Assessment: Jose tolerates interventions well. He has persistent numbness tingling. No change in numbness tingling with exercise, but pain did increase with repeated retraction extension. Could try supine retraction extension nv.         Plan: Continue per plan of care.      Precautions: Cardiac hx, 6/ weeks s/p L foot surgery no precautions      Manuals 4/29 5/3 5/7 5/10 5/13 5/17 5/20      R periscapular TPR  8 min 8 min 8 min 8 min 8 mi  8 min      Prone PA thoracic spine mobilizations T12-C7   2x30 ea 2x30 ea 2x30 ea 2x30 ea 2x30 ea                                Neuro Re-Ed             Posture edu 10 min            L side bendin with self OP and median n glide (tray exercise)  3x10 3x10                                                                           Ther Ex             Supine thoracic extension over foam roll   2x10 2x10 2x10 2x10 2x10      Self TPR education   5 min          Supine retraction off edge of bed with towel    2x10 2x10 2x10 2x10      No monnies       GTB 3x10                                                                       Ther Activity                                       Gait Training                                       Modalities

## 2024-05-30 ENCOUNTER — OFFICE VISIT (OUTPATIENT)
Dept: PHYSICAL THERAPY | Facility: CLINIC | Age: 54
End: 2024-05-30
Payer: COMMERCIAL

## 2024-05-30 DIAGNOSIS — M25.511 ACUTE PAIN OF RIGHT SHOULDER: ICD-10-CM

## 2024-05-30 DIAGNOSIS — M54.2 CERVICALGIA: Primary | ICD-10-CM

## 2024-05-30 PROCEDURE — 97140 MANUAL THERAPY 1/> REGIONS: CPT

## 2024-05-30 PROCEDURE — 97110 THERAPEUTIC EXERCISES: CPT

## 2024-05-30 NOTE — PROGRESS NOTES
Daily Note     Today's date: 2024  Patient name: Jose Nix  : 1970  MRN: [id number]  Referring provider: Tl Mcqueen*  Dx:   Encounter Diagnosis     ICD-10-CM    1. Cervicalgia  M54.2       2. Acute pain of right shoulder  M25.511                      Subjective: Pt reports his tennis tolerance is slowly improving but he did have pain with activity. It did not last into the next day.       Objective: See treatment diary below      Assessment: Jose continues to seem to improve most with STM to thoracic spine. Incorporated additional periscapular strengthening into POC for improved stability. May need to assess 1st rib or TOS nv.         Plan: Continue per plan of care.      Precautions: Cardiac hx, 6/7 weeks s/p L foot surgery no precautions      Manuals 4/29 5/3 5/7 5/10 5/13 5/17 5/20 5/30     R periscapular TPR  8 min 8 min 8 min 8 min 8 mi  8 min 8 min     Prone PA thoracic spine mobilizations T12-C7   2x30 ea 2x30 ea 2x30 ea 2x30 ea 2x30 ea 2x30 ea                               Neuro Re-Ed             Posture edu 10 min            L side bendin with self OP and median n glide (tray exercise)  3x10 3x10                                                                           Ther Ex             Supine thoracic extension over foam roll   2x10 2x10 2x10 2x10 2x10 2x10     Self TPR education   5 min          Supine retraction off edge of bed with towel    2x10 2x10 2x10 2x10 2x10     No monnies       GTB 3x10 GTB 3x10     Bent over row        15# 3x10     TB row        3x10                                            Ther Activity                                       Gait Training                                       Modalities

## 2024-06-13 ENCOUNTER — OFFICE VISIT (OUTPATIENT)
Dept: PHYSICAL THERAPY | Facility: CLINIC | Age: 54
End: 2024-06-13
Payer: COMMERCIAL

## 2024-06-13 DIAGNOSIS — M54.2 CERVICALGIA: Primary | ICD-10-CM

## 2024-06-13 DIAGNOSIS — M25.511 ACUTE PAIN OF RIGHT SHOULDER: ICD-10-CM

## 2024-06-13 PROCEDURE — 97110 THERAPEUTIC EXERCISES: CPT

## 2024-06-13 NOTE — PROGRESS NOTES
Daily Note     Today's date: 2024  Patient name: Jsoe Nix  : 1970  MRN: [id number]  Referring provider: Tl Mcqueen*  Dx:   Encounter Diagnosis     ICD-10-CM    1. Cervicalgia  M54.2       2. Acute pain of right shoulder  M25.511                      Subjective: Pt reports playing tennis 3 times a week for 2 hours at a time without issues. Pt reports hitting the tennis ball with full effort about 20% of the time with minimal pain. Pt requests to discharge to home exercise program at this time.       Objective: See treatment diary below      Assessment: Pt demonstrated good understanding of HEP. Pt demonstrated moderate TrPs in R periscapular region, sufficient fatigue with scapular strengthening.         Plan: Pt will be d/c'd to independent HEP at this time.      Precautions: Cardiac hx, 6/ weeks s/p L foot surgery no precautions      Manuals 4/29 5/3 5/7 5/10 5/13 5/17 5/20 5/30 6/13    R periscapular TPR  8 min 8 min 8 min 8 min 8 mi  8 min 8 min 8 min    Prone PA thoracic spine mobilizations T12-C7   2x30 ea 2x30 ea 2x30 ea 2x30 ea 2x30 ea 2x30 ea                               Neuro Re-Ed             Posture edu 10 min            L side bendin with self OP and median n glide (tray exercise)  3x10 3x10                                                                           Ther Ex             Supine thoracic extension over foam roll   2x10 2x10 2x10 2x10 2x10 2x10 2x10    Self TPR education   5 min          Supine retraction off edge of bed with towel    2x10 2x10 2x10 2x10 2x10 3x10    No monnies       GTB 3x10 GTB 3x10 GTB  3x12    Bent over row        15# 3x10 15#  3x10    TB row        3x10 STB  3x10                                           Ther Activity                                       Gait Training                                       Modalities

## 2024-06-14 DIAGNOSIS — F51.01 PRIMARY INSOMNIA: ICD-10-CM

## 2024-06-14 RX ORDER — ZOLPIDEM TARTRATE 10 MG/1
10 TABLET ORAL
Qty: 30 TABLET | Refills: 0 | Status: SHIPPED | OUTPATIENT
Start: 2024-06-14

## 2024-06-14 NOTE — TELEPHONE ENCOUNTER
Reason for call:   [x] Refill   [] Prior Auth  [] Other:     Office:   [x] PCP/Provider - Jurgen Mcqueen MD   [] Specialty/Provider -     Medication: zolpidem (AMBIEN) 10 mg tablet     Dose/Frequency: Take 1 tablet (10 mg total) by mouth daily at bedtime as needed for sleep     Quantity: 30    Pharmacy: [facility] #[id number] [facility] [address] [place] [facility] [phone]    Does the patient have enough for 3 days?   [x] Yes   [] No - Send as HP to POD

## 2024-06-14 NOTE — TELEPHONE ENCOUNTER
1 [id number] 05/18/2024 05/16/2024 Zolpidem Tartrate (Tablet) 30.0 30 10 [facility]. Commercial Insurance 0 / 0 PA    1 [id number] 04/18/2024 04/15/2024 Zolpidem Tartrate (Tablet) 30.0 30 10 MG NA [facility]O SHE[facility] Commercial Insurance 0 / 0 PA    1 [id number] 03/20/2024 03/20/2024 Zolpidem Tartrate (Tablet) 30.0 30 10 MG [facility] Commercial Insurance 0 / 0 PA    1 [id number] 03/08/2024 03/08/2024 oxyCODONE HYDROCHLORIDE 5 MG ORAL TABLET/ACETAMINOPHEN 325 MG (Tablet) 10.0 1 5.0 MG/325.0 MG 75.0 [facility]. Commercial Insurance 0 / 0 PA    1 [id number] 02/15/2024 01/23/2024 Zolpidem Tartrate (Tablet) 30.0 30 10 MG NA [facility]. Commercial Insurance 1 / 1 PA    1 [id number] 01/23/2024 01/23/2024 Zolpidem Tartrate (Tablet) 30.0 30 10 MG [facility] Commercial Insurance 0 / 1 PA    1 [id number] 12/19/2023 12/19/2023 Zolpidem Tartrate (Tablet) 30.0 30 10 MG NA [facility] Commercial Insurance 0 / 0 PA    1 [id number] 11/22/2023 11/22/2023 Zolpidem Tartrate (Tablet) 30.0 30 10 MG NA [facility] Commercial Insurance 0 / 0 PA    1 [id number] 10/20/2023 10/20/2023 Zolpidem Tartrate (Tablet) 30.0 30 10 MG NA JESSIE WALLACE [employer] Commercial Insurance 0 / 0 PA    1 [id number] 09/22/2023 09/21/2023 Zolpidem Tartrate (Tablet) 30.0 30 10 MG NA MINGO SHEA POGODZINSKI [employer] Commercial Insurance 0 / 0 PA

## 2024-06-21 ENCOUNTER — TELEPHONE (OUTPATIENT)
Dept: FAMILY MEDICINE CLINIC | Facility: CLINIC | Age: 54
End: 2024-06-21

## 2024-07-16 ENCOUNTER — TELEPHONE (OUTPATIENT)
Age: 54
End: 2024-07-16

## 2024-07-16 DIAGNOSIS — F51.01 PRIMARY INSOMNIA: ICD-10-CM

## 2024-07-16 RX ORDER — ZOLPIDEM TARTRATE 10 MG/1
10 TABLET ORAL
Qty: 30 TABLET | Refills: 0 | Status: SHIPPED | OUTPATIENT
Start: 2024-07-16 | End: 2024-07-18 | Stop reason: SDUPTHER

## 2024-07-16 NOTE — TELEPHONE ENCOUNTER
Pt states the medication refill went to the wrong pharmacy. Please send script to   [facility] location on file.

## 2024-07-16 NOTE — TELEPHONE ENCOUNTER
1 [id number] 06/14/2024 06/14/2024 Zolpidem Tartrate (Tablet) 30.0 30 10 MG NA MICHAEL POSADAS [facility] Commercial Insurance 0 / 0 [place]    1 [id number] 05/18/2024 05/16/2024 Zolpidem Tartrate (Tablet) 30.0 30 10 MG NA [facility]O [facility] [place] 0 / 0 [place]    1 [id number] 04/18/2024 04/15/2024 Zolpidem Tartrate (Tablet) 30.0 30 10 MG NA [facility]O SHEA [facility] [place] / 0 [place]    1 [id number] 03/20/2024 03/20/2024 Zolpidem Tartrate (Tablet) 30.0 30 10 MG NA [facility]O SHEA [facility] Commercial [place] 0 / 0 [place]    1 [id number] 03/08/2024 03/08/2024 oxyCODONE HYDROCHLORIDE 5 MG ORAL TABLET/ACETAMINOPHEN 325 MG (Tablet) 10.0 1 325 MG-5 MG 75.0 [facility] Commercial Insurance 0 / 0 [place]    1 [id number] 02/15/2024 01/23/2024 Zolpidem Tartrate (Tablet) 30.0 30 10 MG NA [facility]O [facility] [place] 1 / 1 [place]    1 [id number] 01/23/2024 01/23/2024 Zolpidem Tartrate (Tablet) 30.0 30 10 MG NA MINGO SHEA WAN [facility] Commercial Insurance 0 / 1 [place]    1 [id number] 12/19/2023 12/19/2023 Zolpidem Tartrate (Tablet) 30.0 30 10 MG NA MINGO SHEA WAN [facility] Commercial Insurance 0 / 0 [place]    1 [id number] 11/22/2023 11/22/2023 Zolpidem Tartrate (Tablet) 30.0 30 10 MG NA MINGO SHEA POGODZINSKI [facility] Commercial Insurance 0 / 0 [place]    1 [id number] 10/20/2023 10/20/2023 Zolpidem Tartrate (Tablet) 30.0 30 10 MG NA JESSIE WALLACE [facility] [employer] Commercial [place] 0 / 0 [place]

## 2024-07-16 NOTE — TELEPHONE ENCOUNTER
Reason for call:   [x] Refill   [] Prior Auth  [] Other:     Office:   [x] PCP/Provider - Jurgen Mcqueen  [] Specialty/Provider -     Medication:       Does the patient have enough for 3 days?   [] Yes   [x] No - Send as HP to POD

## 2024-07-16 NOTE — TELEPHONE ENCOUNTER
Pt was calling because he had a wound on hand that he could not get to stop bleeding. Pt was transfered to the clinical staff for help.

## 2024-07-16 NOTE — TELEPHONE ENCOUNTER
Patient transferred from the ,    Patient stated on his right forearm,started bleeding and he is not sure how it happened. He was sitting at his desk and looked down he noticed blood. He cleaned it up and put a Band-Aid, which also was drenched in blood. Currently has a towel on it and it won't stop bleeding. Has been bleeding for a half hour.       Spoke with Dr. Johnson, She advice patient to wet a tea bag and apply pressure for 10 to 15 minutes. If it doesn't stop, advised the ER.

## 2024-07-18 DIAGNOSIS — F51.01 PRIMARY INSOMNIA: ICD-10-CM

## 2024-07-18 RX ORDER — ZOLPIDEM TARTRATE 10 MG/1
10 TABLET ORAL
Qty: 30 TABLET | Refills: 0 | Status: SHIPPED | OUTPATIENT
Start: 2024-07-18

## 2024-07-18 NOTE — TELEPHONE ENCOUNTER
1 [id number] 06/14/2024 06/14/2024 Zolpidem Tartrate (Tablet) 30.0 30 10 MG NA MICHAEL POSADAS [facility] Commercial [place] 0 / 0 [place]    1 [id number] 05/18/2024 05/16/2024 Zolpidem Tartrate (Tablet) 30.0 30 10 MG NA MINGO SHEA [facility] Commercial Insurance 0 / 0 [place]    1 [id number] 04/18/2024 04/15/2024 Zolpidem Tartrate (Tablet) 30.0 30 10 MG NA MINGO SHEA [facility] Commercial [place] 0 / 0 [place]    1 [id number] 03/20/2024 03/20/2024 Zolpidem Tartrate (Tablet) 30.0 30 10 MG NA MINGO SHEA [facility] Commercial Insurance 0 / 0 [place]    1 [id number] 03/08/2024 03/08/2024 oxyCODONE HYDROCHLORIDE 5 MG ORAL TABLET/ACETAMINOPHEN 325 MG (Tablet) 10.0 1 325 MG-5 MG 75.0 [facility] Commercial Insurance 0 / 0 [place]    1 [id number] 02/15/2024 01/23/2024 Zolpidem Tartrate (Tablet) 30.0 30 10 MG NA [facility]O SHEA [facility] [place] 1 / 1 [place]    1 [id number] 01/23/2024 01/23/2024 Zolpidem Tartrate (Tablet) 30.0 30 10 MG NA MINGO SHEA WAN [facility] Commercial Insurance 0 / 1 [place]    1 [id number] 12/19/2023 12/19/2023 Zolpidem Tartrate (Tablet) 30.0 30 10 MG NA MINGO SHEA WAN [facility] Commercial Insurance 0 / 0 [place]    1 [id number] 11/22/2023 11/22/2023 Zolpidem Tartrate (Tablet) 30.0 30 10 MG NA MINGO SHEA POGODZINSKI [facility] Commercial Insurance 0 / 0 [place]    1 [id number] 10/20/2023 10/20/2023 Zolpidem Tartrate (Tablet) 30.0 30 10 MG NA JESSIE WALLACE [email or web address] [employer] Commercial Insurance 0 / 0 [place]

## 2024-07-18 NOTE — TELEPHONE ENCOUNTER
Sent to wrong pharmacy     Reason for call:   [x] Refill   [] Prior Auth  [] Other:     Office:   [x] PCP/Provider -   [] Specialty/Provider -     Medication: zolpidem (AMBIEN) 10 mg    Dose/Frequency: Take 1 tablet (10 mg total) by mouth daily     Quantity: 30    Pharmacy: [id number] [place], [place] - [address]     Does the patient have enough for 3 days?   [] Yes   [x] No - Send as HP to POD

## 2024-08-12 DIAGNOSIS — F51.01 PRIMARY INSOMNIA: ICD-10-CM

## 2024-08-12 NOTE — TELEPHONE ENCOUNTER
Reason for call:   [x] Refill   [] Prior Auth  [] Other:     Office:   [x] PCP/Provider - [facility] Bernie Mcqueen MD   [] Specialty/Provider -     Medication: zolpidem (AMBIEN) 10 mg tablet     Dose/Frequency: Take 1 tablet (10 mg total) by mouth daily at bedtime as needed for sleep     Quantity: 30 tablet     Pharmacy: [id number] [address] [facility] [phone]    Does the patient have enough for 3 days?   [] Yes   [x] No - Send as HP to POD

## 2024-08-13 RX ORDER — ZOLPIDEM TARTRATE 10 MG/1
10 TABLET ORAL
Qty: 30 TABLET | Refills: 0 | Status: SHIPPED | OUTPATIENT
Start: 2024-08-13

## 2024-08-13 NOTE — TELEPHONE ENCOUNTER
1 [id number] 07/18/2024 07/18/2024 Zolpidem Tartrate (Tablet) 30.0 30 10 MG NA MINGO SHEA [facility] [place] 0 / 0 [place]    1 [id number] 06/14/2024 06/14/2024 Zolpidem Tartrate (Tablet) 30.0 30 10 MG NA MICHAEL POSADAS [facility] Commercial Insurance 0 / 0 [place]    1 [id number] 05/18/2024 05/16/2024 Zolpidem Tartrate (Tablet) 30.0 30 10 MG NA MINGO SHEA [facility] Commercial Insurance 0 / 0 [place]    1 [id number] 04/18/2024 04/15/2024 Zolpidem Tartrate (Tablet) 30.0 30 10 MG NA MINGO SHEA [facility] Commercial Insurance 0 / 0 [place]    1 [id number] 03/20/2024 03/20/2024 Zolpidem Tartrate (Tablet) 30.0 30 10 MG NA MINGO SHEA [facility] Commercial Insurance 0 / 0 [place]    1 [id number] 03/08/2024 03/08/2024 oxyCODONE HYDROCHLORIDE 5 MG ORAL TABLET/ACETAMINOPHEN 325 MG (Tablet) 10.0 1 325 MG-5 MG 75.0 [facility] [place] 0 / 0 [place]    1 [id number] 02/15/2024 01/23/2024 Zolpidem Tartrate (Tablet) 30.0 30 10 MG NA MINGO SHEA [facility] Commercial Insurance 1 / 1 [place]    1 [id number] 01/23/2024 01/23/2024 Zolpidem Tartrate (Tablet) 30.0 30 10 MG NA MINGO SHEA [facility] Commercial Insurance 0 / 1 [place]    1 [id number] 12/19/2023 12/19/2023 Zolpidem Tartrate (Tablet) 30.0 30 10 MG MINGO THURMAN POGODZIKaiser [facility]. Commercial Insurance 0 / 0 [place]    1 [id number] 11/22/2023 11/22/2023 Zolpidem Tartrate (Tablet) 30.0 30 10 MG NA MINGO SHEA, [facility] Commercial Insurance 0 / 0 [place]

## 2024-08-28 DIAGNOSIS — I10 ESSENTIAL HYPERTENSION: ICD-10-CM

## 2024-08-28 RX ORDER — METOPROLOL SUCCINATE 25 MG/1
25 TABLET, EXTENDED RELEASE ORAL DAILY
Qty: 90 TABLET | Refills: 1 | Status: SHIPPED | OUTPATIENT
Start: 2024-08-28

## 2024-09-16 DIAGNOSIS — F51.01 PRIMARY INSOMNIA: ICD-10-CM

## 2024-09-16 RX ORDER — ZOLPIDEM TARTRATE 10 MG/1
10 TABLET ORAL
Qty: 30 TABLET | Refills: 0 | Status: SHIPPED | OUTPATIENT
Start: 2024-09-16

## 2024-09-16 NOTE — TELEPHONE ENCOUNTER
1 [id number] 08/16/2024 08/13/2024 Zolpidem Tartrate (Tablet) 30.0 30 10 MG NA MICHAEL [facility] Commercial Insurance 0 / 0 [place]    1 [id number] 07/18/2024 07/18/2024 Zolpidem Tartrate (Tablet) 30.0 30 10 MG NA [facility] Commercial Insurance 0 / 0 [place]    1 [id number] 06/14/2024 06/14/2024 Zolpidem Tartrate (Tablet) 30.0 30 10 MG NA MICHAEL [employer] Commercial Insurance 0 / 0 [place]    1 [id number] 05/18/2024 05/16/2024 Zolpidem Tartrate (Tablet) 30.0 30 10 MG NA [facility] Commercial [place] 0 / 0 [place]    1 [id number] 04/18/2024 04/15/2024 Zolpidem Tartrate (Tablet) 30.0 30 10 MG NA [facility] Commercial Insurance 0 / 0 [place]    1 [id number] 03/20/2024 03/20/2024 Zolpidem Tartrate (Tablet) 30.0 30 10 MG NA [facility] Commercial Insurance 0 / 0 [place]    1 [id number] 03/08/2024 03/08/2024 oxyCODONE HYDROCHLORIDE 5 MG ORAL TABLET/ACETAMINOPHEN 325 MG (Tablet) 10.0 1 325 MG-5 MG 75.0 [facility] Commercial Insurance 0 / 0 [place]    1 [id number] 02/15/2024 01/23/2024 Zolpidem Tartrate (Tablet) 30.0 30 10 MG NA [facility] [employer] Commercial Insurance 1 / 1 [place]    1 [id number] 01/23/2024 01/23/2024 Zolpidem Tartrate (Tablet) 30.0 30 10 MG NA [facility] Commercial Insurance 0 / 1 [place]    1 [id number] 12/19/2023 12/19/2023 Zolpidem Tartrate (Tablet) 30.0 30 10 MG NA MINGO SHEA POGODZINSKI [employer] Commercial Insurance 0 / 0 [place]

## 2024-09-16 NOTE — TELEPHONE ENCOUNTER
Reason for call:   [x] Refill   [] Prior Auth  [] Other:     Office:   [] PCP/Provider -   [] Specialty/Provider -     Medication: zolpidem     Dose/Frequency: 10 mg take one at bedtime     Quantity: 30    Pharmacy: [facility]    Does the patient have enough for 3 days?   [] Yes   [x] No - Send as HP to POD- only has 1 left     Has Your Rash Been Biopsied Before?: has been biopsied previously Have You Had Previous Patch Testing In The Past?: none How Severe Is Your Rash At Its Worst?: moderate Who Is Your Referring Doctor?: LAURA What Is Your Occupation?: RETIRED

## 2024-09-18 ENCOUNTER — OFFICE VISIT (OUTPATIENT)
Dept: FAMILY MEDICINE CLINIC | Facility: CLINIC | Age: 54
End: 2024-09-18

## 2024-09-18 ENCOUNTER — APPOINTMENT (OUTPATIENT)
Dept: LAB | Facility: CLINIC | Age: 54
End: 2024-09-18
Payer: COMMERCIAL

## 2024-09-18 VITALS
HEIGHT: 74 IN | HEART RATE: 60 BPM | SYSTOLIC BLOOD PRESSURE: 110 MMHG | DIASTOLIC BLOOD PRESSURE: 74 MMHG | TEMPERATURE: 97.2 F | WEIGHT: 259.6 LBS | BODY MASS INDEX: 33.32 KG/M2 | OXYGEN SATURATION: 99 %

## 2024-09-18 DIAGNOSIS — R41.3 MEMORY CHANGE: Primary | ICD-10-CM

## 2024-09-18 DIAGNOSIS — E78.2 MIXED HYPERLIPIDEMIA: ICD-10-CM

## 2024-09-18 DIAGNOSIS — I10 PRIMARY HYPERTENSION: ICD-10-CM

## 2024-09-18 DIAGNOSIS — R41.3 MEMORY CHANGE: ICD-10-CM

## 2024-09-18 DIAGNOSIS — R41.3 MEMORY DYSFUNCTION: ICD-10-CM

## 2024-09-18 DIAGNOSIS — I25.10 CORONARY ARTERY DISEASE INVOLVING NATIVE CORONARY ARTERY OF NATIVE HEART WITHOUT ANGINA PECTORIS: ICD-10-CM

## 2024-09-18 DIAGNOSIS — I25.10 ARTERIOSCLEROTIC CARDIOVASCULAR DISEASE: ICD-10-CM

## 2024-09-18 LAB
ALBUMIN SERPL BCG-MCNC: 4.6 G/DL (ref 3.5–5)
ALP SERPL-CCNC: 49 U/L (ref 34–104)
ALT SERPL W P-5'-P-CCNC: 22 U/L (ref 7–52)
ANION GAP SERPL CALCULATED.3IONS-SCNC: 9 MMOL/L (ref 4–13)
AST SERPL W P-5'-P-CCNC: 23 U/L (ref 13–39)
BASOPHILS # BLD AUTO: 0.02 THOUSANDS/ΜL (ref 0–0.1)
BASOPHILS NFR BLD AUTO: 0 % (ref 0–1)
BILIRUB SERPL-MCNC: 0.68 MG/DL (ref 0.2–1)
BUN SERPL-MCNC: 12 MG/DL (ref 5–25)
CALCIUM SERPL-MCNC: 9.9 MG/DL (ref 8.4–10.2)
CHLORIDE SERPL-SCNC: 102 MMOL/L (ref 96–108)
CHOLEST SERPL-MCNC: 150 MG/DL
CO2 SERPL-SCNC: 28 MMOL/L (ref 21–32)
CREAT SERPL-MCNC: 0.87 MG/DL (ref 0.6–1.3)
EOSINOPHIL # BLD AUTO: 0.24 THOUSAND/ΜL (ref 0–0.61)
EOSINOPHIL NFR BLD AUTO: 3 % (ref 0–6)
ERYTHROCYTE [DISTWIDTH] IN BLOOD BY AUTOMATED COUNT: 12.7 % (ref 11.6–15.1)
FOLATE SERPL-MCNC: >22.3 NG/ML
GFR SERPL CREATININE-BSD FRML MDRD: 97 ML/MIN/1.73SQ M
GLUCOSE P FAST SERPL-MCNC: 93 MG/DL (ref 65–99)
HCT VFR BLD AUTO: 47 % (ref 36.5–49.3)
HDLC SERPL-MCNC: 41 MG/DL
HGB BLD-MCNC: 15.7 G/DL (ref 12–17)
IMM GRANULOCYTES # BLD AUTO: 0.02 THOUSAND/UL (ref 0–0.2)
IMM GRANULOCYTES NFR BLD AUTO: 0 % (ref 0–2)
LDLC SERPL CALC-MCNC: 75 MG/DL (ref 0–100)
LDLC SERPL DIRECT ASSAY-MCNC: 86 MG/DL (ref 0–100)
LYMPHOCYTES # BLD AUTO: 1.12 THOUSANDS/ΜL (ref 0.6–4.47)
LYMPHOCYTES NFR BLD AUTO: 16 % (ref 14–44)
MCH RBC QN AUTO: 31.3 PG (ref 26.8–34.3)
MCHC RBC AUTO-ENTMCNC: 33.4 G/DL (ref 31.4–37.4)
MCV RBC AUTO: 94 FL (ref 82–98)
MONOCYTES # BLD AUTO: 0.66 THOUSAND/ΜL (ref 0.17–1.22)
MONOCYTES NFR BLD AUTO: 9 % (ref 4–12)
NEUTROPHILS # BLD AUTO: 5.03 THOUSANDS/ΜL (ref 1.85–7.62)
NEUTS SEG NFR BLD AUTO: 72 % (ref 43–75)
NONHDLC SERPL-MCNC: 109 MG/DL
NRBC BLD AUTO-RTO: 0 /100 WBCS
PLATELET # BLD AUTO: 131 THOUSANDS/UL (ref 149–390)
PMV BLD AUTO: 11.7 FL (ref 8.9–12.7)
POTASSIUM SERPL-SCNC: 4.4 MMOL/L (ref 3.5–5.3)
PROT SERPL-MCNC: 7.8 G/DL (ref 6.4–8.4)
RBC # BLD AUTO: 5.02 MILLION/UL (ref 3.88–5.62)
SODIUM SERPL-SCNC: 139 MMOL/L (ref 135–147)
TRIGL SERPL-MCNC: 171 MG/DL
TSH SERPL DL<=0.05 MIU/L-ACNC: 2.38 UIU/ML (ref 0.45–4.5)
VIT B12 SERPL-MCNC: 479 PG/ML (ref 180–914)
WBC # BLD AUTO: 7.09 THOUSAND/UL (ref 4.31–10.16)

## 2024-09-18 PROCEDURE — 36415 COLL VENOUS BLD VENIPUNCTURE: CPT

## 2024-09-18 PROCEDURE — 83721 ASSAY OF BLOOD LIPOPROTEIN: CPT

## 2024-09-18 PROCEDURE — 85025 COMPLETE CBC W/AUTO DIFF WBC: CPT

## 2024-09-18 PROCEDURE — 82746 ASSAY OF FOLIC ACID SERUM: CPT

## 2024-09-18 PROCEDURE — 80053 COMPREHEN METABOLIC PANEL: CPT

## 2024-09-18 PROCEDURE — 80061 LIPID PANEL: CPT

## 2024-09-18 PROCEDURE — 84443 ASSAY THYROID STIM HORMONE: CPT

## 2024-09-18 PROCEDURE — 82607 VITAMIN B-12: CPT

## 2024-09-18 NOTE — PROGRESS NOTES
"Ambulatory Visit  Name: Jose Nix      : 1970      MRN: [id number]  Encounter Provider: Jurgen Mcqueen MD  Encounter Date: 2024   Encounter department: [facility]    Assessment & Plan  Memory change  MMSE 30. Pt states that he has always had issues remembering names but denies other cognitive issues. Recheck 6m    Orders:    CBC and differential; Future    Comprehensive metabolic panel; Future    TSH, 3rd generation with Free T4 reflex; Future    Vitamin B12; Future    Folate; Future    Primary hypertension  Denies CP, palpitations, lightheadedness or other CV symptoms with or without exertion      Orders:    CBC and differential; Future    Comprehensive metabolic panel; Future    Lipid panel; Future    Arteriosclerotic cardiovascular disease  Pt asymptomatic. Continue present care. Monitor labs. F/u with Cardio.  Recheck 6m         Memory dysfunction  MMSE 30. Pt states that he has always had issues remembering names but denies other cognitive issues. Recheck 6m         Mixed hyperlipidemia  Continue present care. Monitor diet. Recheck 6m              History of Present Illness     f/u multiple med issues  - pt notes that stress is better since he cut back to part time work. \"Life is good right now\"  - pt still needs to take Ambien to sleep.  Patient does not have any problems initiating sleep however if he does not take the Ambien, he will wake up multiple times a night, not feel rested in the morning, and feel that he really was not sleeping well throughout the night.  - R should pain resolved with PT.   - pt has been exercising more over the last month.  Denies CP, palpitations, lightheadedness or other CV symptoms with or without exertion.  He is up to date with Cardio and is 12y s/p CABG x 4  - notes some increased allergy issues.   - wife notes that his memory may be a little worse. Recently forgot his grand daughters name briefly (she is a ). " "He states that he may have issues with other peoples names as well  - pt denies any new GI or  complaints        Review of Systems   Constitutional: Negative.    HENT: Negative.     Eyes: Negative.    Respiratory: Negative.     Cardiovascular: Negative.    Gastrointestinal: Negative.    Endocrine: Negative.    Genitourinary: Negative.    Musculoskeletal: Negative.    Skin: Negative.    Allergic/Immunologic: Negative.    Neurological: Negative.    Hematological: Negative.    Psychiatric/Behavioral:  Positive for sleep disturbance (unless he takes ambien). Negative for decreased concentration and dysphoric mood. The patient is not nervous/anxious.         ?memory issues?     Past Medical History:   Diagnosis Date    Acute angina (HCC)     per pt \"no recent episode--anxiety attack approx 4 years ago\"    Allergic     seasonal    Chronic ITP (idiopathic thrombocytopenia) (HCC)     Coronary artery disease     [facility]- Dr Snyder    Dental crown present     and Veneers    Family history of diabetes mellitus in mother     \"pt does not know biological father or the history\"    High blood pressure     NOT HYPERTENSION    History of heart murmur in childhood     Hyperlipidemia     Low platelet count (HCC)     no longer seeing a hematologist    Shortness of breath     \"generally with activity\"    ST elevation (STEMI) myocardial infarction of unspecified site (HCC)     per pt \"technically no heart attack\"    Valvular heart disease     Wears glasses      Past Surgical History:   Procedure Laterality Date    CARDIAC SURGERY      OPEN HEART QUAD BYPASS; RESOLVED: 2012    CORONARY ANGIOPLASTY  02/21/2012    CORONARY ANGIOGRAPHY WITHOUT CONCOMITANT LEFT HEART CATHETERIZATION; 90% PROXIMAL LAD, 90% 1ST DIAG OSTIUM AND 95% PROXIMAL THIRD, 99% PROXIMAL RAMUS INTERMEDIUS    CORONARY ANGIOPLASTY WITH STENT PLACEMENT  02/21/2012    4 VESSEL DISEASE TO HAVE CABG; MANAGED BY: SHABBIR SNYDER    CORONARY ARTERY BYPASS GRAFT      " LAST ASSESSED: 11/17/17--metal wires in sternum for closure    CORONARY ARTERY BYPASS GRAFT  02/29/2012    CABG X4 (SVG TO 1ST DIAGNOL, SVG TO 3RD OBTUSE MARGINAL, FREE RADIAL ARTERY TO RAMUS INTERMEDIUS, S[place] TO LAD) BY DR. GUTIERREZ 2012     FOOT SURGERY Right     bone spur    KS PRTL EXC B1 TARSAL/METAR B1 XCP TALUS/CALCANEUS Left 3/8/2024    Procedure: EXCISION EXOSTOSIS LEFT FOOT WITH EXCISION OF ADVENTITIOUS BURSA;  Surgeon: Khoa Smith DPM;  Location:  MAIN OR;  Service: Podiatry    TONSILLECTOMY      VASECTOMY  10/27/2015    SURGERY VAS DEFERENS; MANAGED BY: LEILANI HEREDIA; LAST ASSESSED: 10/27/15    WISDOM TOOTH EXTRACTION       Family History   Problem Relation Age of Onset    Coronary artery disease Mother     Heart disease Mother     Hypertension Mother      Social History     Tobacco Use    Smoking status: Never     Passive exposure: Past    Smokeless tobacco: Never   Vaping Use    Vaping status: Never Used   Substance and Sexual Activity    Alcohol use: Yes     Alcohol/week: 3.0 standard drinks of alcohol     Types: 3 Cans of beer per week     Comment: BEING A SOCIAL DRINKER: <= 3 DRINK/WEEK    Drug use: Never    Sexual activity: Not on file     Comment: defer     Current Outpatient Medications on File Prior to Visit   Medication Sig    Acetaminophen 500 MG Take by mouth every 6 (six) hours as needed for mild pain    aspirin 81 mg chewable tablet Chew 1 tablet daily    metoprolol succinate (TOPROL-XL) 25 mg 24 hr tablet TAKE 1 TABLET DAILY    Multiple Vitamins-Minerals (MULTIVITAMIN WITH MINERALS) tablet Take 2 tablets by mouth daily at bedtime    rosuvastatin (CRESTOR) 20 MG tablet TAKE 1 TABLET DAILY    zolpidem (AMBIEN) 10 mg tablet Take 1 tablet (10 mg total) by mouth daily at bedtime as needed for sleep    predniSONE 20 mg tablet 3 tab po qd x 2 then 2 tab po qd x 2 then 1 tab po qd x 2 (Patient not taking: Reported on 9/18/2024)     No Known Allergies  Immunization History  "  Administered Date(s) Administered    COVID-19 PFIZER VACCINE 0.3 ML IM 02/25/2021, 03/18/2021, 11/11/2021    COVID-19 Pfizer Vac BIVALENT John-sucrose 12 Yr+ IM 10/20/2022    Fluzone Split Quad 0.5 mL 10/28/2015, 11/04/2016    INFLUENZA 11/08/2014, 10/28/2015, 11/04/2016, 10/04/2018, 09/30/2022    Influenza Injectable, MDCK, Preservative Free, Quadrivalent, 0.5 mL 11/25/2019, 10/15/2020    Influenza Quadrivalent Preservative Free 3 years and older IM 10/28/2015, 11/04/2016    Influenza Quadrivalent, 6-35 Months IM 09/18/2017    Influenza, injectable, quadrivalent, preservative free 0.5 mL 10/09/2023    Influenza, recombinant, quadrivalent,injectable, preservative free 09/30/2022     Objective     /74 (BP Location: Left arm, Patient Position: Sitting, Cuff Size: Large)   Pulse 60   Temp (!) 97.2 °F (36.2 °C)   Ht 6' 2\" (1.88 m)   Wt 118 kg (259 lb 9.6 oz)   SpO2 99%   BMI 33.33 kg/m²     Physical Exam  Vitals reviewed.   Constitutional:       Appearance: He is well-developed.   HENT:      Head: Normocephalic and atraumatic.      Right Ear: Tympanic membrane, ear canal and external ear normal.      Left Ear: Tympanic membrane, ear canal and external ear normal.      Nose: Nose normal.      Mouth/Throat:      Mouth: Mucous membranes are moist.   Eyes:      Extraocular Movements: Extraocular movements intact.      Conjunctiva/sclera: Conjunctivae normal.      Pupils: Pupils are equal, round, and reactive to light.   Neck:      Thyroid: No thyromegaly.      Vascular: No JVD.   Cardiovascular:      Rate and Rhythm: Normal rate and regular rhythm.      Pulses: Normal pulses.      Heart sounds: Normal heart sounds. No murmur heard.  Pulmonary:      Effort: Pulmonary effort is normal. No respiratory distress.      Breath sounds: Normal breath sounds. No wheezing or rales.   Abdominal:      General: Bowel sounds are normal. There is no distension.      Palpations: Abdomen is soft. There is no mass.      " Tenderness: There is no abdominal tenderness.   Musculoskeletal:         General: No swelling, tenderness or deformity. Normal range of motion.      Cervical back: Normal range of motion and neck supple. No tenderness. No muscular tenderness.      Right lower leg: No edema.      Left lower leg: No edema.   Lymphadenopathy:      Cervical: No cervical adenopathy.   Skin:     General: Skin is warm.      Capillary Refill: Capillary refill takes less than 2 seconds.   Neurological:      Mental Status: He is alert and oriented to person, place, and time.      Cranial Nerves: No cranial nerve deficit.      Sensory: No sensory deficit.      Motor: No weakness or abnormal muscle tone.      Coordination: Coordination normal.      Gait: Gait normal.      Deep Tendon Reflexes: Reflexes normal.      Comments: MMSE 30/30   Psychiatric:         Mood and Affect: Mood normal.         Behavior: Behavior normal.         Thought Content: Thought content normal.         Judgment: Judgment normal.      Comments: PHQ-2/9 Depression Screening    Little interest or pleasure in doing things: 0 - not at all  Feeling down, depressed, or hopeless: 0 - not at all  Trouble falling or staying asleep, or sleeping too much: 0 - not at all  Feeling tired or having little energy: 0 - not at all  Poor appetite or overeatin - not at all  Feeling bad about yourself - or that you are a failure or have let   yourself or your family down: 0 - not at all  Trouble concentrating on things, such as reading the newspaper or watching   television: 0 - not at all  Moving or speaking so slowly that other people could have noticed. Or the   opposite - being so fidgety or restless that you have been moving around a   lot more than usual: 0 - not at all  Thoughts that you would be better off dead, or of hurting yourself in some   way: 0 - not at all  PHQ-9 Score: 0  PHQ-9 Interpretation: No or Minimal depression

## 2024-09-20 NOTE — ASSESSMENT & PLAN NOTE
MMSE 30/30. Pt states that he has always had issues remembering names but denies other cognitive issues. Recheck 6m

## 2024-09-20 NOTE — ASSESSMENT & PLAN NOTE
Denies CP, palpitations, lightheadedness or other CV symptoms with or without exertion      Orders:    CBC and differential; Future    Comprehensive metabolic panel; Future    Lipid panel; Future

## 2024-09-23 ENCOUNTER — APPOINTMENT (EMERGENCY)
Dept: RADIOLOGY | Facility: HOSPITAL | Age: 54
End: 2024-09-23
Payer: COMMERCIAL

## 2024-09-23 ENCOUNTER — HOSPITAL ENCOUNTER (EMERGENCY)
Facility: HOSPITAL | Age: 54
Discharge: HOME | End: 2024-09-23
Attending: EMERGENCY MEDICINE | Admitting: EMERGENCY MEDICINE
Payer: COMMERCIAL

## 2024-09-23 VITALS
SYSTOLIC BLOOD PRESSURE: 135 MMHG | HEART RATE: 57 BPM | TEMPERATURE: 98 F | DIASTOLIC BLOOD PRESSURE: 81 MMHG | RESPIRATION RATE: 18 BRPM | OXYGEN SATURATION: 99 %

## 2024-09-23 DIAGNOSIS — R42 VERTIGO: ICD-10-CM

## 2024-09-23 DIAGNOSIS — I10 ESSENTIAL HYPERTENSION: ICD-10-CM

## 2024-09-23 DIAGNOSIS — R00.1 BRADYCARDIA: ICD-10-CM

## 2024-09-23 DIAGNOSIS — U07.1 COVID: Primary | ICD-10-CM

## 2024-09-23 LAB
ALBUMIN SERPL BCG-MCNC: 4.4 G/DL (ref 3.5–5)
ALP SERPL-CCNC: 46 U/L (ref 34–104)
ALT SERPL W P-5'-P-CCNC: 19 U/L (ref 7–52)
ANION GAP SERPL CALCULATED.3IONS-SCNC: 6 MMOL/L (ref 4–13)
AST SERPL W P-5'-P-CCNC: 19 U/L (ref 13–39)
BASOPHILS # BLD AUTO: 0.01 THOUSANDS/ΜL (ref 0–0.1)
BASOPHILS NFR BLD AUTO: 0 % (ref 0–1)
BILIRUB SERPL-MCNC: 0.48 MG/DL (ref 0.2–1)
BUN SERPL-MCNC: 12 MG/DL (ref 5–25)
CALCIUM SERPL-MCNC: 9.3 MG/DL (ref 8.4–10.2)
CARDIAC TROPONIN I PNL SERPL HS: <2 NG/L
CHLORIDE SERPL-SCNC: 105 MMOL/L (ref 96–108)
CO2 SERPL-SCNC: 27 MMOL/L (ref 21–32)
CREAT SERPL-MCNC: 0.88 MG/DL (ref 0.6–1.3)
EOSINOPHIL # BLD AUTO: 0.11 THOUSAND/ΜL (ref 0–0.61)
EOSINOPHIL NFR BLD AUTO: 3 % (ref 0–6)
ERYTHROCYTE [DISTWIDTH] IN BLOOD BY AUTOMATED COUNT: 12.3 % (ref 11.6–15.1)
FLUAV AG UPPER RESP QL IA.RAPID: NEGATIVE
FLUBV AG UPPER RESP QL IA.RAPID: NEGATIVE
GFR SERPL CREATININE-BSD FRML MDRD: 97 ML/MIN/1.73SQ M
GLUCOSE SERPL-MCNC: 96 MG/DL (ref 65–140)
HCT VFR BLD AUTO: 43.6 % (ref 36.5–49.3)
HGB BLD-MCNC: 14.8 G/DL (ref 12–17)
IMM GRANULOCYTES # BLD AUTO: 0.01 THOUSAND/UL (ref 0–0.2)
IMM GRANULOCYTES NFR BLD AUTO: 0 % (ref 0–2)
LYMPHOCYTES # BLD AUTO: 1.26 THOUSANDS/ΜL (ref 0.6–4.47)
LYMPHOCYTES NFR BLD AUTO: 30 % (ref 14–44)
MAGNESIUM SERPL-MCNC: 1.9 MG/DL (ref 1.9–2.7)
MCH RBC QN AUTO: 31 PG (ref 26.8–34.3)
MCHC RBC AUTO-ENTMCNC: 33.9 G/DL (ref 31.4–37.4)
MCV RBC AUTO: 91 FL (ref 82–98)
MONOCYTES # BLD AUTO: 0.44 THOUSAND/ΜL (ref 0.17–1.22)
MONOCYTES NFR BLD AUTO: 11 % (ref 4–12)
NEUTROPHILS # BLD AUTO: 2.31 THOUSANDS/ΜL (ref 1.85–7.62)
NEUTS SEG NFR BLD AUTO: 56 % (ref 43–75)
NRBC BLD AUTO-RTO: 0 /100 WBCS
PLATELET # BLD AUTO: 131 THOUSANDS/UL (ref 149–390)
PMV BLD AUTO: 11 FL (ref 8.9–12.7)
POTASSIUM SERPL-SCNC: 4.3 MMOL/L (ref 3.5–5.3)
PROT SERPL-MCNC: 7.6 G/DL (ref 6.4–8.4)
RBC # BLD AUTO: 4.78 MILLION/UL (ref 3.88–5.62)
SARS-COV+SARS-COV-2 AG RESP QL IA.RAPID: POSITIVE
SODIUM SERPL-SCNC: 138 MMOL/L (ref 135–147)
WBC # BLD AUTO: 4.14 THOUSAND/UL (ref 4.31–10.16)

## 2024-09-23 PROCEDURE — 85025 COMPLETE CBC W/AUTO DIFF WBC: CPT | Performed by: EMERGENCY MEDICINE

## 2024-09-23 PROCEDURE — 96374 THER/PROPH/DIAG INJ IV PUSH: CPT

## 2024-09-23 PROCEDURE — 96361 HYDRATE IV INFUSION ADD-ON: CPT

## 2024-09-23 PROCEDURE — 84484 ASSAY OF TROPONIN QUANT: CPT | Performed by: EMERGENCY MEDICINE

## 2024-09-23 PROCEDURE — 83735 ASSAY OF MAGNESIUM: CPT

## 2024-09-23 PROCEDURE — 36415 COLL VENOUS BLD VENIPUNCTURE: CPT

## 2024-09-23 PROCEDURE — 80053 COMPREHEN METABOLIC PANEL: CPT | Performed by: EMERGENCY MEDICINE

## 2024-09-23 PROCEDURE — 99284 EMERGENCY DEPT VISIT MOD MDM: CPT

## 2024-09-23 PROCEDURE — 93005 ELECTROCARDIOGRAM TRACING: CPT

## 2024-09-23 PROCEDURE — 87811 SARS-COV-2 COVID19 W/OPTIC: CPT | Performed by: EMERGENCY MEDICINE

## 2024-09-23 PROCEDURE — 87804 INFLUENZA ASSAY W/OPTIC: CPT | Performed by: EMERGENCY MEDICINE

## 2024-09-23 PROCEDURE — 99285 EMERGENCY DEPT VISIT HI MDM: CPT | Performed by: EMERGENCY MEDICINE

## 2024-09-23 PROCEDURE — 71045 X-RAY EXAM CHEST 1 VIEW: CPT

## 2024-09-23 RX ORDER — MECLIZINE HYDROCHLORIDE 25 MG/1
25 TABLET ORAL 3 TIMES DAILY PRN
Qty: 30 TABLET | Refills: 0 | Status: SHIPPED | OUTPATIENT
Start: 2024-09-23

## 2024-09-23 RX ORDER — ONDANSETRON 2 MG/ML
4 INJECTION INTRAMUSCULAR; INTRAVENOUS ONCE
Status: COMPLETED | OUTPATIENT
Start: 2024-09-23 | End: 2024-09-23

## 2024-09-23 RX ORDER — MECLIZINE HCL 12.5 MG 12.5 MG/1
25 TABLET ORAL ONCE
Status: COMPLETED | OUTPATIENT
Start: 2024-09-23 | End: 2024-09-23

## 2024-09-23 RX ORDER — ONDANSETRON 4 MG/1
4 TABLET, ORALLY DISINTEGRATING ORAL EVERY 6 HOURS PRN
Qty: 20 TABLET | Refills: 0 | Status: SHIPPED | OUTPATIENT
Start: 2024-09-23

## 2024-09-23 RX ORDER — FLUTICASONE PROPIONATE 50 MCG
1 SPRAY, SUSPENSION (ML) NASAL DAILY
Qty: 16 G | Refills: 0 | Status: SHIPPED | OUTPATIENT
Start: 2024-09-23

## 2024-09-23 RX ADMIN — MECLIZINE HYDROCHLORIDE 25 MG: 12.5 TABLET ORAL at 13:51

## 2024-09-23 RX ADMIN — ONDANSETRON 4 MG: 2 INJECTION INTRAMUSCULAR; INTRAVENOUS at 13:52

## 2024-09-23 RX ADMIN — SODIUM CHLORIDE 1000 ML: 0.9 INJECTION, SOLUTION INTRAVENOUS at 13:49

## 2024-09-23 NOTE — DISCHARGE INSTRUCTIONS
"- Take meclizine as needed for \"room spinning\" type dizziness  - Use flonase 1-2 times daily for congestion - this may take a few days to work but will help decrease intranasal inflammation and help with sinus drainage  - Drink plenty of fluids, rest. Call your cardiologist for follow up as soon as you are able.   - Cut your metoprolol dose in half and take 12.5mg daily until you follow up with your cardiologist outpatient  - You can take zofran every 4 hours as needed for nausea associated with dizziness    Return to the ED if symptoms acutely worsen, including if you develop new chest pain, worsening dizziness, or cannot keep down food or water.   Follow up with your PCP as soon as you are able - they may consider antibiotic therapy if sinus symptoms persist for longer than 10 days  "

## 2024-09-24 NOTE — ED PROVIDER NOTES
"1. COVID    2. Essential hypertension    3. Bradycardia    4. Vertigo      ED Disposition       ED Disposition   Discharge    Condition   Stable    Date/Time   Mon Sep 23, 2024  4:09 PM    Comment   Jose Nix discharge to home/self care.                   Assessment & Plan       Medical Decision Making  55 yo M presenting from urgent care with nausea, dizziness, and cold-like symptoms in the context of being COVID positive. Was referred here to to bradycardia. On initial evaluation, he was not in acute distress, with bradycardia but otherwise stable vital signs. EKG done and observation of telemetry monitoring throughout ED stay confirmed rhythm of sinus bradycardia - there was no heart block, interval abnormality, acute ST-T wave abnormality observed. His blood pressures remained stable throughout ED stay. Given patient's reported ear fullness and congestion, description of \"room spinning\" dizziness do suspect possible vertigo as contribution to dizziness symptoms, especially with symptoms reportedly worse when lying down. Lab workup was done given significant cardiac history and was without abnormality. He was treated with IV hydration and medications for supportive care with symptomatic improvement. Symptoms are likely secondary to COVID infection. Did consult with cardiologist on call who did advise bradycardia could be secondary to patient being on beta blocker with possible contribution from his nausea. Did reduce dose of beta blocker by half at recommendation of consultant pending outpatient follow up with his personal cardiologist. At time of discharge, he was in stable condition and agreeable to treatment with supportive care and outpatient follow-up.    Problems Addressed:  Bradycardia: undiagnosed new problem with uncertain prognosis  COVID: acute illness or injury  Vertigo: acute illness or injury    Amount and/or Complexity of Data Reviewed  Labs: ordered. Decision-making details documented in ED " "Course.  Radiology: ordered.    Risk  Prescription drug management.                ED Course as of 09/24/24 0648   Mon Sep 23, 2024   1412 SARS COV Rapid Antigen(!): Positive       Medications   sodium chloride 0.9 % bolus 1,000 mL (0 mL Intravenous Stopped 9/23/24 1449)   meclizine (ANTIVERT) tablet 25 mg (25 mg Oral Given 9/23/24 1351)   ondansetron (ZOFRAN) injection 4 mg (4 mg Intravenous Given 9/23/24 1352)       History of Present Illness       HPI      Patient is a 55 yo M presenting with nausea, dizziness, cold-like symptoms from urgent care. Symptoms started approximately one week ago. Reports cough, congestion, sneezing, and in last 2 days did have worsening nausea, loss of appetite, dizziness with \"room spinning\", and ear fullness. Initially went to urgent care as he was concerned for an ear infection and was found to be bradycardic with rate in the 40s. With concern this could be contributing to dizziness, he was referred to ED for further evaluation.     Review of Systems   Constitutional:  Positive for appetite change and fatigue. Negative for chills and fever.   HENT:  Positive for congestion, sinus pressure and sneezing. Negative for ear pain and sore throat.    Eyes:  Negative for pain and visual disturbance.   Respiratory:  Negative for cough and shortness of breath.    Cardiovascular:  Negative for chest pain and palpitations.   Gastrointestinal:  Negative for abdominal pain and vomiting.   Genitourinary:  Negative for dysuria and hematuria.   Musculoskeletal:  Negative for arthralgias and back pain.   Skin:  Negative for color change and rash.   Neurological:  Positive for dizziness. Negative for seizures and syncope.   All other systems reviewed and are negative.          Objective     ED Triage Vitals   Temperature Pulse Blood Pressure Respirations SpO2 Patient Position - Orthostatic VS   09/23/24 1225 09/23/24 1224 09/23/24 1224 09/23/24 1225 09/23/24 1224 09/23/24 1224   98 °F (36.7 °C) (!) " 49 152/99 18 98 % Sitting      Temp Source Heart Rate Source BP Location FiO2 (%) Pain Score    09/23/24 1225 -- 09/23/24 1224 -- 09/23/24 1224    Oral  Right arm  No Pain        Physical Exam  Vitals and nursing note reviewed.   Constitutional:       General: He is not in acute distress.     Appearance: He is well-developed.   HENT:      Head: Normocephalic and atraumatic.      Right Ear: Tympanic membrane and ear canal normal.      Left Ear: Tympanic membrane and ear canal normal.      Ears:      Comments: Small amount of fluid behind Tms bilaterally     Nose: Congestion present.      Mouth/Throat:      Mouth: Mucous membranes are moist.   Eyes:      Conjunctiva/sclera: Conjunctivae normal.   Cardiovascular:      Rate and Rhythm: Regular rhythm. Bradycardia present.      Heart sounds: No murmur heard.  Pulmonary:      Effort: Pulmonary effort is normal. No respiratory distress.      Breath sounds: Normal breath sounds.   Abdominal:      Palpations: Abdomen is soft.      Tenderness: There is no abdominal tenderness.   Musculoskeletal:         General: No swelling.   Skin:     General: Skin is warm and dry.   Neurological:      General: No focal deficit present.      Mental Status: He is alert and oriented to person, place, and time.   Psychiatric:         Mood and Affect: Mood normal.         Labs Reviewed   COVID-19/INFLUENZA A/B RAPID ANTIGEN (30 MIN.TAT) - Abnormal       Result Value    SARS COV Rapid Antigen Positive (*)     Influenza A Rapid Antigen Negative      Influenza B Rapid Antigen Negative      Narrative:     This test has been performed using the Quidel Bertha 2 FLU+SARS Antigen test under the Emergency Use Authorization (EUA). This test has been validated by the  and verified by the performing laboratory. The Bertha uses lateral flow immunofluorescent sandwich assay to detect SARS-COV, Influenza A and Influenza B Antigen.     The Quidel Bertha 2 SARS Antigen test does not differentiate  between SARS-CoV and SARS-CoV-2.     Negative results are presumptive and may be confirmed with a molecular assay, if necessary, for patient management. Negative results do not rule out SARS-CoV-2 or influenza infection and should not be used as the sole basis for treatment or patient management decisions. A negative test result may occur if the level of antigen in a sample is below the limit of detection of this test.     Positive results are indicative of the presence of viral antigens, but do not rule out bacterial infection or co-infection with other viruses.     All test results should be used as an adjunct to clinical observations and other information available to the provider.    FOR PEDIATRIC PATIENTS - copy/paste COVID Guidelines URL to browser: [email or web address]   CBC AND DIFFERENTIAL - Abnormal    WBC 4.14 (*)     RBC 4.78      Hemoglobin 14.8      Hematocrit 43.6      MCV 91      MCH 31.0      MCHC 33.9      RDW 12.3      MPV 11.0      Platelets 131 (*)     nRBC 0      Segmented % 56      Immature Grans % 0      Lymphocytes % 30      Monocytes % 11      Eosinophils Relative 3      Basophils Relative 0      Absolute Neutrophils 2.31      Absolute Immature Grans 0.01      Absolute Lymphocytes 1.26      Absolute Monocytes 0.44      Eosinophils Absolute 0.11      Basophils Absolute 0.01     HS TROPONIN I 0HR - Normal    hs TnI 0hr <2     MAGNESIUM - Normal    Magnesium 1.9     COMPREHENSIVE METABOLIC PANEL    Sodium 138      Potassium 4.3      Chloride 105      CO2 27      ANION GAP 6      BUN 12      Creatinine 0.88      Glucose 96      Calcium 9.3      AST 19      ALT 19      Alkaline Phosphatase 46      Total Protein 7.6      Albumin 4.4      Total Bilirubin 0.48      eGFR 97      Narrative:     National Kidney Disease Foundation guidelines for Chronic Kidney Disease (CKD):     Stage 1 with normal or high GFR (GFR > 90 mL/min/1.73 square meters)     Stage 2 Mild CKD (GFR = 60-89 mL/min/1.73 square meters)    Stage 3A Moderate CKD (GFR = 45-59 mL/min/1.73 square meters)    Stage 3B Moderate CKD (GFR = 30-44 mL/min/1.73 square meters)    Stage 4 Severe CKD (GFR = 15-29 mL/min/1.73 square meters)    Stage 5 End Stage CKD (GFR <15 mL/min/1.73 square meters)  Note: GFR calculation is accurate only with a steady state creatinine     XR chest 1 view portable   Final Interpretation by Sumeet Morse MD (09/23 1401)      No acute cardiopulmonary disease.            Workstation performed: [id number]             ECG 12 Lead Documentation Only    Date/Time: 9/23/2024 3:35 PM    Performed by: Emi Zhu DO  Authorized by: Emi Zhu DO    Indications / Diagnosis:  Bradycardia  ECG reviewed by me, the ED Provider: yes    Patient location:  ED  Previous ECG:     Previous ECG:  Compared to current    Similarity:  No change  Interpretation:     Interpretation: abnormal    Rate:     ECG rate:  48    ECG rate assessment: bradycardic    Rhythm:     Rhythm: sinus bradycardia    Ectopy:     Ectopy: none    QRS:     QRS axis:  Normal  Conduction:     Conduction: normal    ST segments:     ST segments:  Normal  T waves:     T waves: normal        ED Medication and Procedure Management   Prior to Admission Medications   Prescriptions Last Dose Informant Patient Reported? Taking?   Acetaminophen 500 MG  Self Yes No   Sig: Take by mouth every 6 (six) hours as needed for mild pain   Multiple Vitamins-Minerals (MULTIVITAMIN WITH MINERALS) tablet  Self Yes No   Sig: Take 2 tablets by mouth daily at bedtime   aspirin 81 mg chewable tablet  Self Yes No   Sig: Chew 1 tablet daily   metoprolol succinate (TOPROL-XL) 25 mg 24 hr tablet   No No   Sig: TAKE 1 TABLET DAILY   rosuvastatin (CRESTOR) 20 MG tablet   No No   Sig: TAKE 1 TABLET DAILY   zolpidem (AMBIEN) 10 mg tablet   No No   Sig: Take 1 tablet (10 mg total) by mouth daily at bedtime as needed  for sleep      Facility-Administered Medications: None     Discharge Medication List as of 9/23/2024  4:14 PM        START taking these medications    Details   fluticasone (FLONASE) 50 mcg/act nasal spray 1 spray into each nostril daily, Starting Mon 9/23/2024, Normal      meclizine (ANTIVERT) 25 mg tablet Take 1 tablet (25 mg total) by mouth 3 (three) times a day as needed for dizziness, Starting Mon 9/23/2024, Normal      ondansetron (ZOFRAN-ODT) 4 mg disintegrating tablet Take 1 tablet (4 mg total) by mouth every 6 (six) hours as needed for nausea, Starting Mon 9/23/2024, Normal           CONTINUE these medications which have NOT CHANGED    Details   Acetaminophen 500 MG Take by mouth every 6 (six) hours as needed for mild pain, Historical Med      aspirin 81 mg chewable tablet Chew 1 tablet daily, Historical Med      metoprolol succinate (TOPROL-XL) 25 mg 24 hr tablet TAKE 1 TABLET DAILY, Starting Wed 8/28/2024, Normal      Multiple Vitamins-Minerals (MULTIVITAMIN WITH MINERALS) tablet Take 2 tablets by mouth daily at bedtime, Historical Med      rosuvastatin (CRESTOR) 20 MG tablet TAKE 1 TABLET DAILY, Starting Tue 5/14/2024, Normal      zolpidem (AMBIEN) 10 mg tablet Take 1 tablet (10 mg total) by mouth daily at bedtime as needed for sleep, Starting Mon 9/16/2024, Normal           No discharge procedures on file.     Emi Zhu, DO  09/24/24 0656

## 2024-09-26 LAB
ATRIAL RATE: 53 BPM
P AXIS: 66 DEGREES
PR INTERVAL: 198 MS
QRS AXIS: 45 DEGREES
QRSD INTERVAL: 106 MS
QT INTERVAL: 442 MS
QTC INTERVAL: 414 MS
T WAVE AXIS: 56 DEGREES
VENTRICULAR RATE: 53 BPM

## 2024-09-26 PROCEDURE — 93010 ELECTROCARDIOGRAM REPORT: CPT | Performed by: INTERNAL MEDICINE

## 2024-11-04 DIAGNOSIS — F51.01 PRIMARY INSOMNIA: ICD-10-CM

## 2024-11-04 NOTE — TELEPHONE ENCOUNTER
Reason for call:   [x] Refill   [] Prior Auth  [] Other:     Office:   [x] PCP/Provider - Richar  [] Specialty/Provider -     Medication: Zolpidem 10mg    Dose/Frequency: 1 tab hs prn    Quantity: 30    Pharmacy: [facility] #[id number] [place], [place] - [address] [facility], [facility] [phone]     Does the patient have enough for 3 days?   [x] Yes   [] No - Send as HP to POD

## 2024-11-05 RX ORDER — ZOLPIDEM TARTRATE 10 MG/1
10 TABLET ORAL
Qty: 30 TABLET | Refills: 0 | Status: SHIPPED | OUTPATIENT
Start: 2024-11-05

## 2024-11-05 NOTE — TELEPHONE ENCOUNTER
1 [id number] 09/16/2024 09/16/2024 Zolpidem Tartrate (Tablet) 30.0 30 10 MG NA [facility] [place] 0 / 0 [place]    1 [id number] 08/16/2024 08/13/2024 Zolpidem Tartrate (Tablet) 30.0 30 10 MG NA MICHAEL [facility] [place] / 0 [place]    1 [id number] 07/18/2024 07/18/2024 Zolpidem Tartrate (Tablet) 30.0 30 10 MG NA [facility] Commercial [place] 0 / 0 [place]    1 [id number] 06/14/2024 06/14/2024 Zolpidem Tartrate (Tablet) 30.0 30 10 MG NA MICHAEL [facility]. Commercial Insurance 0 / 0 [place]    1 [id number] 05/18/2024 05/16/2024 Zolpidem Tartrate (Tablet) 30.0 30 10 MG NA [facility] [employer] Commercial [place] / 0 [place]    1 [id number] 04/18/2024 04/15/2024 Zolpidem Tartrate (Tablet) 30.0 30 10 MG NA [facility] Commercial [place] / 0 [place]    1 [id number] 03/20/2024 03/20/2024 Zolpidem Tartrate (Tablet) 30.0 30 10 MG NA [facility] [employer] [employer] [place] / 0 [place]    1 [id number] 03/08/2024 03/08/2024 oxyCODONE HYDROCHLORIDE 5 MG ORAL TABLET/ACETAMINOPHEN 325 MG (Tablet) 10.0 1 325 MG-5 MG 75.0 [facility] ????, [facility]. Commercial [place] / 0 [place]    1 [id number] 02/15/2024 01/23/2024 Zolpidem Tartrate (Tablet) 30.0 30 10 MG NA MINGO SHEA [facility] Commercial Insurance 1 / 1 [place]    1 [id number] 01/23/2024 01/23/2024 Zolpidem Tartrate (Tablet) 30.0 30 10 MG NA MINGO SHEA POGODZINSKI [employer] Commercial Insurance 0 / 1 [place]

## 2024-11-07 DIAGNOSIS — E78.2 MIXED HYPERLIPIDEMIA: ICD-10-CM

## 2024-11-07 RX ORDER — ROSUVASTATIN CALCIUM 20 MG/1
20 TABLET, COATED ORAL DAILY
Qty: 90 TABLET | Refills: 1 | Status: SHIPPED | OUTPATIENT
Start: 2024-11-07

## 2025-01-03 DIAGNOSIS — F51.01 PRIMARY INSOMNIA: ICD-10-CM

## 2025-01-03 RX ORDER — ZOLPIDEM TARTRATE 10 MG/1
10 TABLET ORAL
Qty: 30 TABLET | Refills: 1 | Status: SHIPPED | OUTPATIENT
Start: 2025-01-03

## 2025-01-03 NOTE — TELEPHONE ENCOUNTER
Reason for call:   [x] Refill   [] Prior Auth  [] Other:     Office:   [x] PCP/Provider - Jurgen Mcqueen MD   [] Specialty/Provider -     Medication: Jurgen Mcqueen MD     Dose/Frequency:     10 mg, Oral, Daily at bedtime PRN       Quantity: 30    Pharmacy: [facility] #[id number] [place] [address] [id number]     Does the patient have enough for 3 days?   [x] Yes   [] No - Send as HP to POD

## 2025-01-03 NOTE — TELEPHONE ENCOUNTER
1 [id number] 11/05/2024 11/05/2024 Zolpidem Tartrate (Tablet) 30.0 30 10 MG [facility] [employer] Commercial [place] / 0 [place]    1 [id number] 09/16/2024 09/16/2024 Zolpidem Tartrate (Tablet) 30.0 30 10 MG NA MINGO SHEA [facility] Commercial Insurance [place] / 0 [place]    1 [id number] 08/16/2024 08/13/2024 Zolpidem Tartrate (Tablet) 30.0 30 10 MG NA MICHAEL [employer] [facility] [employer] Commercial [place] / 0 [place]    1 [id number] 07/18/2024 07/18/2024 Zolpidem Tartrate (Tablet) 30.0 30 10 MG NA [facility]ASaint [facility] HospitalBug[facility] Commercial [place] 0 / 0 [place]    1 [id number] 06/14/2024 06/14/2024 Zolpidem Tartrate (Tablet) 30.0 30 10 MG NA MICHAEL [employer] [facility] [employer] Commercial Insurance 0 / 0 [place]    1 [id number] 05/18/2024 05/16/2024 Zolpidem Tartrate (Tablet) 30.0 30 10 MG NA [facility] Commercial [place] 0 / 0 [place]    1 [id number] 04/18/2024 04/15/2024 Zolpidem Tartrate (Tablet) 30.0 30 10 MG NA M[facility]O SHEASaint [facility] HospitalBugB[facility] [employer] Commercial [place] / [place]    1 [id number] 03/20/2024 03/20/2024 Zolpidem Tartrate (Tablet) 30.0 30 10 MG NA M[facility]O [facility]aint [facility] HospitalBugB[facility] [employer] Commercial [place] / [place]    1 [id number] 03/08/2024 03/08/2024 oxyCODONE HYDROCHLORIDE 5 MG ORAL TABLET/ACETAMINOPHEN 325 MG (Tablet) 10.0 1 325 MG-5 MG 75.0 [facility] Commercial Insurance 0 / 0 [place]    1 [id number] 02/15/2024 01/23/2024 Zolpidem Tartrate (Tablet) 30.0 30 10 MG NA MINGO SHEA POGODZINSKI [employer] Commercial Insurance 1 / 1 [place]

## 2025-02-24 DIAGNOSIS — I10 ESSENTIAL HYPERTENSION: ICD-10-CM

## 2025-02-25 RX ORDER — METOPROLOL SUCCINATE 25 MG/1
25 TABLET, EXTENDED RELEASE ORAL DAILY
Qty: 90 TABLET | Refills: 3 | Status: SHIPPED | OUTPATIENT
Start: 2025-02-25

## 2025-02-27 DIAGNOSIS — F51.01 PRIMARY INSOMNIA: ICD-10-CM

## 2025-02-27 RX ORDER — ZOLPIDEM TARTRATE 10 MG/1
10 TABLET ORAL
Qty: 30 TABLET | Refills: 0 | Status: SHIPPED | OUTPATIENT
Start: 2025-02-27

## 2025-02-27 NOTE — TELEPHONE ENCOUNTER
1 [id number] 01/03/2025 01/03/2025 Zolpidem Tartrate (Tablet) 30.0 30 10 MG NA MINGO SHEAFranciscan Health CrawfordsvilleEnergy TelecomSan [facility] [employer] Commercial Insurance 0 / 1 [place]    1 [id number] 11/05/2024 11/05/2024 Zolpidem Tartrate (Tablet) 30.0 30 10 MG NA MINGO SHEAMassachusetts General HospitalHOMEOSTASIS LABSMontrose MD.[employer] Commercial [place] / 0 [place]    1 [id number] 09/16/2024 09/16/2024 Zolpidem Tartrate (Tablet) 30.0 30 10 MG NA MINGO SHEAFranciscan Health CrawfordsvilleEnergy TelecomNSHOMEOSTASIS LABSMontrose MD.[employer] Commercial Insurance [place] / 0 [place]    1 [id number] 08/16/2024 08/13/2024 Zolpidem Tartrate (Tablet) 30.0 30 10 MG NA MICHAEL [employer] Commercial Insurance 0 / 0 [place]    1 [id number] 07/18/2024 07/18/2024 Zolpidem Tartrate (Tablet) 30.0 30 10 MG NA MINGO SHEAMassachusetts General HospitalHOMEOSTASIS LABSMontrose MD.[employer] Commercial Insurance [place] / 0 [place]    1 [id number] 06/14/2024 06/14/2024 Zolpidem Tartrate (Tablet) 30.0 30 10 MG NA MICHAEL [employer] Commercial [place] / 0 [place]    1 [id number] 05/18/2024 05/16/2024 Zolpidem Tartrate (Tablet) 30.0 30 10 MG NA MINGO SHEAMassachusetts General HospitalAuris Surgical [employer] [facility] MD.[employer]. Commercial Insurance 0 / 0 [place]    1 [id number] 04/18/2024 04/15/2024 Zolpidem Tartrate (Tablet) 30.0 30 10 MG NA MINGO SHEAMassachusetts General HospitalHOMEOSTASIS LABSMontrose MD.[employer] Commercial Insurance 0 / 0 [place]    1 [id number] 03/20/2024 03/20/2024 Zolpidem Tartrate (Tablet) 30.0 30 10 MG NA MINGO SHEAFranciscan Health CrawfordsvilleEnergy TelecomNSHOMEOSTASIS LABSMontrose MD.[employer] Commercial Insurance 0 / 0 [place]    1 [id number] 03/08/2024 03/08/2024 oxyCODONE HYDROCHLORIDE 5 MG ORAL TABLET/ACETAMINOPHEN 325 MG (Tablet) 10.0 1 325 MG-5 MG 75.0 [facility] HospitalNUNOTemple [facility] Hospital MD.[employer] INC[employer] Commercial In

## 2025-02-27 NOTE — TELEPHONE ENCOUNTER
Reason for call:   [x] Refill   [] Prior Auth  [] Other:     Office:   [x] PCP/Provider -  Jurgen Mcqueen MD   [] Specialty/Provider -     Medication: zolpidem (AMBIEN) 10 mg tablet     Dose/Frequency:     Take 1 tablet (10 mg total) by mouth daily at bedtime as needed for sleep       Quantity: 30    Pharmacy: [facility] #[id number] [address] [facility] [phone]     Does the patient have enough for 3 days?   [x] Yes   [] No - Send as HP to POD

## 2025-03-07 ENCOUNTER — HOSPITAL ENCOUNTER (OUTPATIENT)
Dept: RADIOLOGY | Facility: HOSPITAL | Age: 55
Discharge: HOME | End: 2025-03-07
Payer: COMMERCIAL

## 2025-03-07 ENCOUNTER — OFFICE VISIT (OUTPATIENT)
Dept: FAMILY MEDICINE CLINIC | Facility: CLINIC | Age: 55
End: 2025-03-07
Payer: COMMERCIAL

## 2025-03-07 ENCOUNTER — TELEPHONE (OUTPATIENT)
Dept: CARDIOLOGY CLINIC | Facility: CLINIC | Age: 55
End: 2025-03-07

## 2025-03-07 ENCOUNTER — APPOINTMENT (OUTPATIENT)
Dept: LAB | Facility: CLINIC | Age: 55
End: 2025-03-07
Payer: COMMERCIAL

## 2025-03-07 VITALS
OXYGEN SATURATION: 98 % | WEIGHT: 249.6 LBS | HEIGHT: 74 IN | HEART RATE: 74 BPM | SYSTOLIC BLOOD PRESSURE: 112 MMHG | BODY MASS INDEX: 32.03 KG/M2 | TEMPERATURE: 97.2 F | DIASTOLIC BLOOD PRESSURE: 70 MMHG

## 2025-03-07 DIAGNOSIS — R06.09 DYSPNEA ON EXERTION: ICD-10-CM

## 2025-03-07 DIAGNOSIS — E78.2 MIXED HYPERLIPIDEMIA: ICD-10-CM

## 2025-03-07 DIAGNOSIS — Z12.5 SCREENING FOR PROSTATE CANCER: ICD-10-CM

## 2025-03-07 DIAGNOSIS — I10 PRIMARY HYPERTENSION: ICD-10-CM

## 2025-03-07 DIAGNOSIS — R94.31 ABNORMAL ECG: ICD-10-CM

## 2025-03-07 DIAGNOSIS — R07.9 CHEST PAIN: ICD-10-CM

## 2025-03-07 DIAGNOSIS — Z95.1 S/P CABG X 4: Primary | ICD-10-CM

## 2025-03-07 DIAGNOSIS — I25.10 ARTERIOSCLEROTIC CARDIOVASCULAR DISEASE: ICD-10-CM

## 2025-03-07 DIAGNOSIS — F32.4 MAJOR DEPRESSIVE DISORDER WITH SINGLE EPISODE, IN PARTIAL REMISSION (HCC): ICD-10-CM

## 2025-03-07 DIAGNOSIS — Z00.00 ANNUAL PHYSICAL EXAM: Primary | ICD-10-CM

## 2025-03-07 PROBLEM — D69.3 CHRONIC ITP (IDIOPATHIC THROMBOCYTOPENIA) (HCC): Status: RESOLVED | Noted: 2020-09-16 | Resolved: 2025-03-07

## 2025-03-07 PROCEDURE — 99396 PREV VISIT EST AGE 40-64: CPT | Performed by: FAMILY MEDICINE

## 2025-03-07 PROCEDURE — 99214 OFFICE O/P EST MOD 30 MIN: CPT | Performed by: FAMILY MEDICINE

## 2025-03-07 PROCEDURE — 93000 ELECTROCARDIOGRAM COMPLETE: CPT | Performed by: FAMILY MEDICINE

## 2025-03-07 PROCEDURE — 71046 X-RAY EXAM CHEST 2 VIEWS: CPT

## 2025-03-07 NOTE — ASSESSMENT & PLAN NOTE
Unclear if patient's dyspnea on exertion and right mid to upper back pain represent an anginal equivalent.  Check labs.  Check chest x-ray.  I reached out to cardiology to discuss  Orders:  •  CBC and differential; Future  •  Comprehensive metabolic panel; Future  •  Lipid panel; Future

## 2025-03-07 NOTE — TELEPHONE ENCOUNTER
----- Message from Jurgen Her DO sent at 3/7/2025  9:19 AM EST -----  See below.  I ordered a nuclear stress test due to pcp note below.  Please let pt know. Rafael. MESFIN  ----- Message -----  From: Jurgen Mcqueen MD  Sent: 3/7/2025   9:03 AM EST  To: Jurgen Her DO    Good morning Jensen    I am seeing our mutual patient today for physical.  He mentioned having some right mid to upper back pain associated with shortness of breath while playing tennis.  Resolves with rest.  Does not radiate.  ECG showed a sinus bradycardia at 51 bpm but no other ischemic changes.  Given his history, and symptoms that seem to be associate with exertion, I was wondering if you would be able to take a look at him and possibly recommend further workup.  I will order chest x-ray in the meantime.  Let me know what you think.-Thanks    Aubrey

## 2025-03-07 NOTE — TELEPHONE ENCOUNTER
I called the patient and l/m regarding scheduling the stress test ordered by Dr. Her. Central scheduling number provided.

## 2025-03-07 NOTE — PROGRESS NOTES
Adult Annual Physical  Name: Jose Nix      : 1970      MRN: [id number]  Encounter Provider: Jurgen Mcqueen MD  Encounter Date: 3/7/2025   Encounter department: [facility]    Assessment & Plan  Annual physical exam         Dyspnea on exertion  I reviewed with patient.  Symptoms associated with right mid to lower back pain.  Both symptoms resolved with rest.  Given his history of coronary artery disease, cannot rule out anginal equivalent.  ECG showed sinus bradycardia but no ischemic findings.  Will check a chest x-ray.  Also check a CBC, CMP and lipids.  I sent a message regarding patient's symptoms to his cardiologist to make them aware and discuss further workup.    Orders:  •  POCT ECG  •  XR chest pa and lateral; Future  •  CBC and differential; Future  •  Comprehensive metabolic panel; Future    Arteriosclerotic cardiovascular disease  Unclear if patient's dyspnea on exertion and right mid to upper back pain represent an anginal equivalent.  Check labs.  Check chest x-ray.  I reached out to cardiology to discuss  Orders:  •  CBC and differential; Future  •  Comprehensive metabolic panel; Future  •  Lipid panel; Future    Primary hypertension  Blood pressure controlled.  Continue present treatment.  Orders:  •  CBC and differential; Future  •  Comprehensive metabolic panel; Future  •  Lipid panel; Future    Major depressive disorder with single episode, in partial remission (HCC)  Patient denies depression but does have some anhedonia.  Unclear if this could represent some adjustment disorder associated with upcoming jail.  I asked patient to discuss this further with his counselor.  Recheck 6 months-earlier if needed         Mixed hyperlipidemia  Check labs.  Continue present treatment.  Adjust dose if not at goal.       Screening for prostate cancer    Orders:  •  PSA, Total Screen; Future    Immunizations and preventive care screenings were discussed  with patient today. Appropriate education was printed on patient's after visit summary.    Discussed risks and benefits of prostate cancer screening. We discussed the controversial history of PSA screening for prostate cancer in the [place] as well as the risk of over detection and over treatment of prostate cancer by way of PSA screening.  The patient understands that PSA blood testing is an imperfect way to screen for prostate cancer and that elevated PSA levels in the blood may also be caused by infection, inflammation, prostatic trauma or manipulation, urological procedures, or by benign prostatic enlargement.    The role of the digital rectal examination in prostate cancer screening was also discussed and I discussed with him that there is large interobserver variability in the findings of digital rectal examination.    Counseling:  Exercise: the importance of regular exercise/physical activity was discussed. Recommend exercise 3-5 times per week for at least 30 minutes.          History of Present Illness     Adult Annual Physical:  Patient presents for annual physical.   - pt doing well  - up to date with Cardio.  Pt notes R back pain with increased SOB with exertion (playing tennis, not with walking).   - memory issues seem to have improved  - pt looking forward to retiring in June 2026  - has been working on diet and exercise since 1/1/25. Pt has lost 18lb so far and feels well  - has been cutting back on the Ambien - now on 5mg (rarely needs 10mg anymore). Also sees a counselor.     Diet and Physical Activity:  - Diet/Nutrition: well balanced diet.  - Exercise: moderate cardiovascular exercise and walking. tennis 2-3x a week, walking most other days.    Depression Screening:    - PHQ-9 Score: 4    General Health:  - Sleep: 7-8 hours of sleep on average. uses ambien - now on 5mg qHS  - Hearing: normal hearing bilateral ears.  - Vision: wears glasses and most recent eye exam < 1 year ago. pt with  "increased intra-occular pressure. Followed by [facility]  - Dental: regular dental visits and brushes teeth twice daily.     Health:  - History of STDs: no.   - Urinary symptoms: none.     Advanced Care Planning:  - Has an advanced directive?: no    - Has a durable medical POA?: no    - ACP document given to patient?: yes      Review of Systems   Constitutional: Negative.    HENT: Negative.     Eyes: Negative.    Respiratory:  Positive for shortness of breath (with exertion).    Cardiovascular: Negative.    Gastrointestinal: Negative.    Endocrine: Negative.    Genitourinary: Negative.    Musculoskeletal:  Positive for back pain (R mid-upper back associated with exertion).   Skin: Negative.    Allergic/Immunologic: Negative.    Neurological: Negative.    Hematological: Negative.    Psychiatric/Behavioral: Negative.       Past Medical History   Past Medical History:   Diagnosis Date   • Acute angina (HCC)     per pt \"no recent episode--anxiety attack approx 4 years ago\"   • Allergic     seasonal   • Chronic ITP (idiopathic thrombocytopenia) (HCC)    • Coronary artery disease      cardiology- Dr Her   • Dental crown present     and Veneers   • Family history of diabetes mellitus in mother     \"pt does not know biological father or the history\"   • High blood pressure     NOT HYPERTENSION   • History of heart murmur in childhood    • Hyperlipidemia    • Low platelet count (HCC)     no longer seeing a hematologist   • Shortness of breath     \"generally with activity\"   • ST elevation (STEMI) myocardial infarction of unspecified site (HCC)     per pt \"technically no heart attack\"   • Valvular heart disease    • Wears glasses      Past Surgical History:   Procedure Laterality Date   • CARDIAC SURGERY      OPEN HEART QUAD BYPASS; RESOLVED: 2012   • CORONARY ANGIOPLASTY  02/21/2012    CORONARY ANGIOGRAPHY WITHOUT CONCOMITANT LEFT HEART CATHETERIZATION; 90% PROXIMAL LAD, 90% 1ST DIAG OSTIUM AND 95% PROXIMAL " THIRD, 99% PROXIMAL RAMUS INTERMEDIUS   • CORONARY ANGIOPLASTY WITH STENT PLACEMENT  02/21/2012    4 VESSEL DISEASE TO HAVE CABG; MANAGED BY: SHABBIR SNYDER   • CORONARY ARTERY BYPASS GRAFT      LAST ASSESSED: 11/17/17--metal wires in sternum for closure   • CORONARY ARTERY BYPASS GRAFT  02/29/2012    CABG X4 (SVG TO 1ST DIAGNOL, SVG TO 3RD OBTUSE MARGINAL, FREE RADIAL ARTERY TO RAMUS INTERMEDIUS, SULLIVAN TO LAD) BY DR. GUTIERREZ 2012    • FOOT SURGERY Right     bone spur   • AL PRTL EXC B1 TARSAL/METAR B1 XCP TALUS/CALCANEUS Left 3/8/2024    Procedure: EXCISION EXOSTOSIS LEFT FOOT WITH EXCISION OF ADVENTITIOUS BURSA;  Surgeon: Khoa Smith DPM;  Location:  MAIN OR;  Service: Podiatry   • TONSILLECTOMY     • VASECTOMY  10/27/2015    SURGERY VAS DEFERENS; MANAGED BY: LEILANI HEREDIA; LAST ASSESSED: 10/27/15   • WISDOM TOOTH EXTRACTION       Family History   Problem Relation Age of Onset   • Coronary artery disease Mother    • Heart disease Mother    • Hypertension Mother       reports that he has never smoked. He has been exposed to tobacco smoke. He has never used smokeless tobacco. He reports current alcohol use of about 3.0 standard drinks of alcohol per week. He reports that he does not use drugs.  Current Outpatient Medications   Medication Instructions   • Acetaminophen 500 MG Every 6 hours PRN   • aspirin 81 mg chewable tablet 1 tablet, Daily   • fluticasone (FLONASE) 50 mcg/act nasal spray 1 spray, Nasal, Daily   • metoprolol succinate (TOPROL-XL) 25 mg, Oral, Daily   • Multiple Vitamins-Minerals (MULTIVITAMIN WITH MINERALS) tablet 2 tablets, Daily at bedtime   • rosuvastatin (CRESTOR) 20 mg, Oral, Daily   • zolpidem (AMBIEN) 10 mg, Oral, Daily at bedtime PRN   No Known Allergies   Current Outpatient Medications on File Prior to Visit   Medication Sig Dispense Refill   • Acetaminophen 500 MG Take by mouth every 6 (six) hours as needed for mild pain     • aspirin 81 mg chewable tablet Chew 1  "tablet daily     • fluticasone (FLONASE) 50 mcg/act nasal spray 1 spray into each nostril daily 16 g 0   • metoprolol succinate (TOPROL-XL) 25 mg 24 hr tablet TAKE 1 TABLET DAILY 90 tablet 3   • Multiple Vitamins-Minerals (MULTIVITAMIN WITH MINERALS) tablet Take 2 tablets by mouth daily at bedtime     • rosuvastatin (CRESTOR) 20 MG tablet TAKE 1 TABLET DAILY 90 tablet 1   • zolpidem (AMBIEN) 10 mg tablet Take 1 tablet (10 mg total) by mouth daily at bedtime as needed for sleep 30 tablet 0   • [DISCONTINUED] meclizine (ANTIVERT) 25 mg tablet Take 1 tablet (25 mg total) by mouth 3 (three) times a day as needed for dizziness 30 tablet 0   • [DISCONTINUED] ondansetron (ZOFRAN-ODT) 4 mg disintegrating tablet Take 1 tablet (4 mg total) by mouth every 6 (six) hours as needed for nausea 20 tablet 0     No current facility-administered medications on file prior to visit.      Social History     Tobacco Use   • Smoking status: Never     Passive exposure: Past   • Smokeless tobacco: Never   Vaping Use   • Vaping status: Never Used   Substance and Sexual Activity   • Alcohol use: Yes     Alcohol/week: 3.0 standard drinks of alcohol     Types: 3 Cans of beer per week     Comment: BEING A SOCIAL DRINKER: <= 3 DRINK/WEEK   • Drug use: Never   • Sexual activity: Not on file     Comment: defer       Objective   /70   Pulse 74   Temp (!) 97.2 °F (36.2 °C)   Ht 6' 2.37\" (1.889 m)   Wt 113 kg (249 lb 9.6 oz)   SpO2 98%   BMI 31.73 kg/m²     Physical Exam  Vitals reviewed.   Constitutional:       Appearance: He is well-developed.   HENT:      Head: Normocephalic and atraumatic.      Right Ear: Tympanic membrane, ear canal and external ear normal.      Left Ear: Tympanic membrane, ear canal and external ear normal.      Nose: Nose normal.      Mouth/Throat:      Mouth: Mucous membranes are moist.   Eyes:      Extraocular Movements: Extraocular movements intact.      Conjunctiva/sclera: Conjunctivae normal.      Pupils: Pupils " are equal, round, and reactive to light.   Neck:      Thyroid: No thyromegaly.      Vascular: No JVD.   Cardiovascular:      Rate and Rhythm: Normal rate and regular rhythm.      Pulses: Normal pulses.      Heart sounds: Normal heart sounds. No murmur heard.  Pulmonary:      Effort: Pulmonary effort is normal. No respiratory distress.      Breath sounds: Normal breath sounds. No wheezing or rales.   Abdominal:      General: Bowel sounds are normal. There is no distension.      Palpations: Abdomen is soft. There is no mass.      Tenderness: There is no abdominal tenderness.   Musculoskeletal:         General: No swelling, tenderness or deformity. Normal range of motion.      Cervical back: Normal range of motion and neck supple. No tenderness. No muscular tenderness.      Right lower leg: No edema.      Left lower leg: No edema.   Lymphadenopathy:      Cervical: No cervical adenopathy.   Skin:     General: Skin is warm.      Capillary Refill: Capillary refill takes less than 2 seconds.   Neurological:      Mental Status: He is alert and oriented to person, place, and time.      Cranial Nerves: No cranial nerve deficit.      Sensory: No sensory deficit.      Motor: No weakness or abnormal muscle tone.      Coordination: Coordination normal.      Gait: Gait normal.      Deep Tendon Reflexes: Reflexes normal.   Psychiatric:         Mood and Affect: Mood normal.         Behavior: Behavior normal.         Thought Content: Thought content normal.         Judgment: Judgment normal.      Comments: PHQ-2/9 Depression Screening    Little interest or pleasure in doing things: 2 - more than half the days  Feeling down, depressed, or hopeless: 1 - several days  Trouble falling or staying asleep, or sleeping too much: 0 - not at all  Feeling tired or having little energy: 0 - not at all  Poor appetite or overeatin - not at all  Feeling bad about yourself - or that you are a failure or have let   yourself or your family down: 0  - not at all  Trouble concentrating on things, such as reading the newspaper or watching   television: 1 - several days  Moving or speaking so slowly that other people could have noticed. Or the   opposite - being so fidgety or restless that you have been moving around a   lot more than usual: 0 - not at all  Thoughts that you would be better off dead, or of hurting yourself in some   way: 0 - not at all  PHQ-9 Score: 4  PHQ-9 Interpretation: No or Minimal depression

## 2025-03-07 NOTE — ASSESSMENT & PLAN NOTE
Patient denies depression but does have some anhedonia.  Unclear if this could represent some adjustment disorder associated with upcoming prison.  I asked patient to discuss this further with his counselor.  Recheck 6 months-earlier if needed

## 2025-03-07 NOTE — ASSESSMENT & PLAN NOTE
Blood pressure controlled.  Continue present treatment.  Orders:  •  CBC and differential; Future  •  Comprehensive metabolic panel; Future  •  Lipid panel; Future

## 2025-03-10 ENCOUNTER — RESULTS FOLLOW-UP (OUTPATIENT)
Dept: FAMILY MEDICINE CLINIC | Facility: CLINIC | Age: 55
End: 2025-03-10

## 2025-03-24 ENCOUNTER — HOSPITAL ENCOUNTER (OUTPATIENT)
Dept: NUCLEAR MEDICINE | Facility: HOSPITAL | Age: 55
Discharge: HOME | End: 2025-03-24
Attending: INTERNAL MEDICINE
Payer: COMMERCIAL

## 2025-03-24 ENCOUNTER — RESULTS FOLLOW-UP (OUTPATIENT)
Dept: NON INVASIVE DIAGNOSTICS | Facility: HOSPITAL | Age: 55
End: 2025-03-24

## 2025-03-24 ENCOUNTER — HOSPITAL ENCOUNTER (OUTPATIENT)
Dept: NON INVASIVE DIAGNOSTICS | Facility: HOSPITAL | Age: 55
Discharge: HOME | End: 2025-03-24
Attending: INTERNAL MEDICINE
Payer: COMMERCIAL

## 2025-03-24 VITALS
RESPIRATION RATE: 16 BRPM | OXYGEN SATURATION: 99 % | SYSTOLIC BLOOD PRESSURE: 102 MMHG | DIASTOLIC BLOOD PRESSURE: 68 MMHG | BODY MASS INDEX: 31.95 KG/M2 | HEIGHT: 74 IN | HEART RATE: 65 BPM | WEIGHT: 249 LBS

## 2025-03-24 DIAGNOSIS — R94.31 ABNORMAL ECG: ICD-10-CM

## 2025-03-24 DIAGNOSIS — Z95.1 S/P CABG X 4: ICD-10-CM

## 2025-03-24 DIAGNOSIS — R07.9 CHEST PAIN: ICD-10-CM

## 2025-03-24 LAB
MAX HR PERCENT: 89 %
MAX HR: 148 BPM
RATE PRESSURE PRODUCT: NORMAL
SL CV REST NUCLEAR ISOTOPE DOSE: 10.1 MCI
SL CV STRESS NUCLEAR ISOTOPE DOSE: 32.3 MCI
SL CV STRESS RECOVERY BP: NORMAL MMHG
SL CV STRESS RECOVERY HR: 80 BPM
SL CV STRESS RECOVERY O2 SAT: 98 %
SPECT HRT GATED+EF W RNC IV: 55 %
STRESS ANGINA INDEX: 0
STRESS BASELINE BP: NORMAL MMHG
STRESS BASELINE HR: 65 BPM
STRESS O2 SAT REST: 99 %
STRESS PEAK HR: 148 BPM
STRESS POST ESTIMATED WORKLOAD: 13.4 METS
STRESS POST EXERCISE DUR MIN: 11 MIN
STRESS POST EXERCISE DUR SEC: 18 SEC
STRESS POST O2 SAT PEAK: 98 %
STRESS POST PEAK BP: 150 MMHG
STRESS/REST PERFUSION RATIO: 1.02

## 2025-03-24 PROCEDURE — 93018 CV STRESS TEST I&R ONLY: CPT | Performed by: INTERNAL MEDICINE

## 2025-03-24 PROCEDURE — 78452 HT MUSCLE IMAGE SPECT MULT: CPT | Performed by: INTERNAL MEDICINE

## 2025-03-24 PROCEDURE — 93017 CV STRESS TEST TRACING ONLY: CPT

## 2025-03-24 PROCEDURE — 93016 CV STRESS TEST SUPVJ ONLY: CPT | Performed by: INTERNAL MEDICINE

## 2025-03-24 PROCEDURE — A9502 TC99M TETROFOSMIN: HCPCS

## 2025-03-24 PROCEDURE — 78452 HT MUSCLE IMAGE SPECT MULT: CPT

## 2025-03-24 NOTE — TELEPHONE ENCOUNTER
----- Message from Kayleigh AQUINO sent at 3/24/2025 12:55 PM EDT -----    ----- Message -----  From: Jurgen Her DO  Sent: 3/24/2025  12:23 PM EDT  To: Card[facility]    Please let patient know stress test was fine.  No evidence of any blockages. Thx. CS    Spoke with pt re: Normal ST rslts.

## 2025-03-27 LAB
CHEST PAIN STATEMENT: NORMAL
MAX DIASTOLIC BP: 70 MMHG
MAX PREDICTED HEART RATE: 166 BPM
PROTOCOL NAME: NORMAL
STRESS POST EXERCISE DUR MIN: 11 MIN
STRESS POST EXERCISE DUR SEC: 18 SEC
STRESS POST PEAK HR: 148 BPM
STRESS POST PEAK SYSTOLIC BP: 150 MMHG
TARGET HR FORMULA: NORMAL
TEST INDICATION: NORMAL

## 2025-04-28 ENCOUNTER — OFFICE VISIT (OUTPATIENT)
Dept: URGENT CARE | Age: 55
End: 2025-04-28
Payer: COMMERCIAL

## 2025-04-28 ENCOUNTER — TELEPHONE (OUTPATIENT)
Age: 55
End: 2025-04-28

## 2025-04-28 VITALS
DIASTOLIC BLOOD PRESSURE: 66 MMHG | HEART RATE: 63 BPM | SYSTOLIC BLOOD PRESSURE: 121 MMHG | TEMPERATURE: 98.2 F | OXYGEN SATURATION: 96 % | RESPIRATION RATE: 18 BRPM

## 2025-04-28 DIAGNOSIS — F51.01 PRIMARY INSOMNIA: ICD-10-CM

## 2025-04-28 DIAGNOSIS — T14.8XXA BRUISING: Primary | ICD-10-CM

## 2025-04-28 PROCEDURE — S9083 URGENT CARE CENTER GLOBAL: HCPCS | Performed by: STUDENT IN AN ORGANIZED HEALTH CARE EDUCATION/TRAINING PROGRAM

## 2025-04-28 PROCEDURE — G0382 LEV 3 HOSP TYPE B ED VISIT: HCPCS | Performed by: STUDENT IN AN ORGANIZED HEALTH CARE EDUCATION/TRAINING PROGRAM

## 2025-04-28 RX ORDER — ZOLPIDEM TARTRATE 10 MG/1
10 TABLET ORAL
Qty: 30 TABLET | Refills: 0 | Status: SHIPPED | OUTPATIENT
Start: 2025-04-28

## 2025-04-28 NOTE — TELEPHONE ENCOUNTER
Pt called in stating he has a grapefruit sized ecchymotic area that is more black than blue on his abdomen approximately 2 inches above umbilicus to the right side. No injury noted and denies pain. Advised UC for further evaluation.  Please advise.

## 2025-04-28 NOTE — PATIENT INSTRUCTIONS
I recommend using cool compress over the area, your body will gradually reabsorb the area of bruising.  I recommend making a recheck appointment with your PCP.  Prior to your appointment, please get the blood work ordered by PCP which will include your blood counts and platelets.  Continue to take your baby aspirin as prescribed.    If you have any severe worsening symptoms such as severe abdominal pain/bloating, rapidly spreading bruising, please go to the ER.

## 2025-04-28 NOTE — TELEPHONE ENCOUNTER
1 [id number] 02/27/2025 02/27/2025 Zolpidem Tartrate (Tablet) 30.0 30 10 MG NA M[facility]O SHEASt. [facility] [email or web address] [email or web address] [facility], [employer] Commercial Insurance 0 / 0 PA    1 [id number] 01/03/2025 01/03/2025 Zolpidem Tartrate (Tablet) 30.0 30 10 MG NA MINGO SHEASt. Vincent Mercy [email or web address] [facility] Commercial Insurance 0 / 1 PA    1 [id number] 11/05/2024 11/05/2024 Zolpidem Tartrate (Tablet) 30.0 30 10 MG NA MINGO SHEASt. Vincent Mercy [email or web address] Hospital [employer] [employer] Commercial Insurance 0 / 0 PA    1 [id number] 09/16/2024 09/16/2024 Zolpidem Tartrate (Tablet) 30.0 30 10 MG NA MINGO SHEASt. Vincent Mercy [email or web address] [facility] Commercial Insurance 0 / 0 PA    1 [id number] 08/16/2024 08/13/2024 Zolpidem Tartrate (Tablet) 30.0 30 10 MG NA [employer] Commercial Insurance 0 / 0 PA    1 [id number] 07/18/2024 07/18/2024 Zolpidem Tartrate (Tablet) 30.0 30 10 MG NA MINGO SHEASt. Vincent Mercy HospitalAllylix [facility] Commercial Insurance 0 / 0 PA    1 [id number] 06/14/2024 06/14/2024 Zolpidem Tartrate (Tablet) 30.0 30 10 MG NA MICHAEL [employer] Commercial Insurance 0 / 0 PA    1 [id number] 05/18/2024 05/16/2024 Zolpidem Tartrate (Tablet) 30.0 30 10 MG NA MINGO SHEASt. Vincent Mercy HospitalLoud GamesLas Vegas [facility] [employer], INC[employer] Commercial Insurance 0 / 0 PA

## 2025-04-28 NOTE — TELEPHONE ENCOUNTER
Reason for call:   [x] Refill   [] Prior Auth  [] Other:     Office:   [x] PCP/Provider - Jurgen Mcqueen   [] Specialty/Provider -     Medication: zolpidem (AMBIEN) 10 mg tablet     Dose/Frequency: Take 1 tablet (10 mg total) by mouth daily at bedtime as needed for sleep     Quantity: 30    Pharmacy: [facility]   Does the patient have enough for 3 days?   [] Yes   [x] No - Send as HP to POD

## 2025-04-28 NOTE — PROGRESS NOTES
[facility]        NAME: Jose Nix is a 54 y.o. male  : 1970    MRN: [id number]  DATE: 2025  TIME: 2:26 PM    Assessment and Plan   Bruising [T14.8XXA]  1. Bruising        See image in media tab/below.  He is overall well-appearing with benign abdominal exam.  His platelets have been mildly low and stable at this range for many years.  There is a nodular area to the center of the bruise and he reports that this is the area that has bruise in the past.  Advise getting labs ordered by his PCP which includes a CBC.  To follow-up with PCP, may consider possible ultrasound of the area of nodularity.    Patient Instructions   I recommend using cool compress over the area, your body will gradually reabsorb the area of bruising.  I recommend making a recheck appointment with your PCP.  Prior to your appointment, please get the blood work ordered by PCP which will include your blood counts and platelets.  Continue to take your baby aspirin as prescribed.    If you have any severe worsening symptoms such as severe abdominal pain/bloating, rapidly spreading bruising, please go to the ER.    Follow up with PCP in 3-5 days.  Proceed to  ER if symptoms worsen.    If tests have been performed at [facility], our office will contact you with results if changes need to be made to the care plan discussed with you at the visit.  You can review your full [facility] on [facility].    Chief Complaint     Chief Complaint   Patient presents with    Bleeding/Bruising     Large ABD bruising. Felt a knot on Wednesday and then noticed the bruising on Thursday. Chronic ITP. Taking baby aspirin.          History of Present Illness       Patient presents for evaluation of bruising to his right abdomen.  He has history of easy bleeding and bruising, history of chronic ITP, his platelet counts have been stable for many years.  He recalls that he used to have bruising to the same area years ago that was  "recurrent.  He previously was on more blood thinners, but is currently only on daily bail be aspirin, he does not recall having this bruising since switching to just the baby aspirin.  He initially felt a small nodule and then noticed the bruising around it.  He feels that the nodule has been present during other episodes of the bruising as well.  He has been having some loose stools recently as well as mild bloating, but otherwise in his usual state of health, no abdominal pain other than when touching directly over the bruise, good appetite, normal urination.  He does not recall any injury to the area.  Does not believe that he has been bearing down recently.        Review of Systems   Review of Systems   All other systems reviewed and are negative.        Current Medications       Current Outpatient Medications:     Acetaminophen 500 MG, Take by mouth every 6 (six) hours as needed for mild pain, Disp: , Rfl:     aspirin 81 mg chewable tablet, Chew 1 tablet daily, Disp: , Rfl:     fluticasone (FLONASE) 50 mcg/act nasal spray, 1 spray into each nostril daily, Disp: 16 g, Rfl: 0    metoprolol succinate (TOPROL-XL) 25 mg 24 hr tablet, TAKE 1 TABLET DAILY, Disp: 90 tablet, Rfl: 3    Multiple Vitamins-Minerals (MULTIVITAMIN WITH MINERALS) tablet, Take 2 tablets by mouth daily at bedtime, Disp: , Rfl:     rosuvastatin (CRESTOR) 20 MG tablet, TAKE 1 TABLET DAILY, Disp: 90 tablet, Rfl: 1    zolpidem (AMBIEN) 10 mg tablet, Take 1 tablet (10 mg total) by mouth daily at bedtime as needed for sleep, Disp: 30 tablet, Rfl: 0    Current Allergies     Allergies as of 04/28/2025    (No Known Allergies)            The following portions of the patient's history were reviewed and updated as appropriate: allergies, current medications, past family history, past medical history, past social history, past surgical history and problem list.     Past Medical History:   Diagnosis Date    Acute angina (HCC)     per pt \"no recent " "episode--anxiety attack approx 4 years ago\"    Allergic     seasonal    Chronic ITP (idiopathic thrombocytopenia) (HCC)     Coronary artery disease      cardiology- Dr Her    Dental crown present     and Veneers    Family history of diabetes mellitus in mother     \"pt does not know biological father or the history\"    High blood pressure     NOT HYPERTENSION    History of heart murmur in childhood     Hyperlipidemia     Low platelet count (HCC)     no longer seeing a hematologist    Shortness of breath     \"generally with activity\"    ST elevation (STEMI) myocardial infarction of unspecified site (HCC)     per pt \"technically no heart attack\"    Valvular heart disease     Wears glasses        Past Surgical History:   Procedure Laterality Date    CARDIAC SURGERY      OPEN HEART QUAD BYPASS; RESOLVED: 2012    CORONARY ANGIOPLASTY  02/21/2012    CORONARY ANGIOGRAPHY WITHOUT CONCOMITANT LEFT HEART CATHETERIZATION; 90% PROXIMAL LAD, 90% 1ST DIAG OSTIUM AND 95% PROXIMAL THIRD, 99% PROXIMAL RAMUS INTERMEDIUS    CORONARY ANGIOPLASTY WITH STENT PLACEMENT  02/21/2012    4 VESSEL DISEASE TO HAVE CABG; MANAGED BY: SHABBIR HER    CORONARY ARTERY BYPASS GRAFT      LAST ASSESSED: 11/17/17--metal wires in sternum for closure    CORONARY ARTERY BYPASS GRAFT  02/29/2012    CABG X4 (SVG TO 1ST DIAGNOL, SVG TO 3RD OBTUSE MARGINAL, FREE RADIAL ARTERY TO RAMUS INTERMEDIUS, SULLIVAN TO LAD) BY DR. GUTIERREZ 2012     FOOT SURGERY Right     bone spur    MI PRTL EXC B1 TARSAL/METAR B1 XCP TALUS/CALCANEUS Left 3/8/2024    Procedure: EXCISION EXOSTOSIS LEFT FOOT WITH EXCISION OF ADVENTITIOUS BURSA;  Surgeon: Khoa Smith DPM;  Location:  MAIN OR;  Service: Podiatry    TONSILLECTOMY      VASECTOMY  10/27/2015    SURGERY VAS DEFERENS; MANAGED BY: LEILANI HEREDIA; LAST ASSESSED: 10/27/15    WISDOM TOOTH EXTRACTION         Family History   Problem Relation Age of Onset    Coronary artery disease Mother     Heart disease " Mother     Hypertension Mother          Medications have been verified.        Objective   /66   Pulse 63   Temp 98.2 °F (36.8 °C)   Resp 18   SpO2 96%   No LMP for male patient.       Physical Exam     Physical Exam  Vitals and nursing note reviewed.   Constitutional:       General: He is not in acute distress.     Appearance: Normal appearance. He is not ill-appearing or toxic-appearing.   HENT:      Head: Normocephalic and atraumatic.      Right Ear: External ear normal.      Left Ear: External ear normal.      Nose: Nose normal.      Mouth/Throat:      Mouth: Mucous membranes are moist.   Eyes:      Extraocular Movements: Extraocular movements intact.   Cardiovascular:      Rate and Rhythm: Normal rate and regular rhythm.      Heart sounds: Normal heart sounds.   Pulmonary:      Effort: Pulmonary effort is normal. No respiratory distress.      Breath sounds: Normal breath sounds. No stridor. No wheezing, rhonchi or rales.   Abdominal:      General: Abdomen is flat.      Palpations: Abdomen is soft.      Tenderness: There is no abdominal tenderness. There is no guarding or rebound.      Comments: No flank/back/side bruising, bruising is limited to the area pictured below.  The white area to the center does feel nodular.  Mild discomfort with touching directly over the bruise, otherwise his abdomen is soft, normal bowel sounds, nontender to palpation throughout.   Skin:     General: Skin is warm and dry.      Capillary Refill: Capillary refill takes less than 2 seconds.      Findings: No rash.   Neurological:      Mental Status: He is alert.      Gait: Gait normal.   Psychiatric:         Behavior: Behavior normal.          Media Information      Document Information    Clinical Image - Mobile Device   Abdominal bruising   04/28/2025 14:21   Attached To:   Jose Nix   Source Information    Nargis Kowalski, DO  [facility]   Document History

## 2025-05-06 DIAGNOSIS — E78.2 MIXED HYPERLIPIDEMIA: ICD-10-CM

## 2025-05-06 RX ORDER — ROSUVASTATIN CALCIUM 20 MG/1
20 TABLET, COATED ORAL DAILY
Qty: 90 TABLET | Refills: 3 | Status: SHIPPED | OUTPATIENT
Start: 2025-05-06

## 2025-06-13 ENCOUNTER — APPOINTMENT (OUTPATIENT)
Dept: LAB | Facility: CLINIC | Age: 55
End: 2025-06-13
Attending: FAMILY MEDICINE
Payer: COMMERCIAL

## 2025-06-13 DIAGNOSIS — I25.10 ARTERIOSCLEROTIC CARDIOVASCULAR DISEASE: ICD-10-CM

## 2025-06-13 DIAGNOSIS — Z12.5 SCREENING FOR PROSTATE CANCER: ICD-10-CM

## 2025-06-13 DIAGNOSIS — R06.09 DYSPNEA ON EXERTION: ICD-10-CM

## 2025-06-13 DIAGNOSIS — I10 PRIMARY HYPERTENSION: ICD-10-CM

## 2025-06-13 LAB
ALBUMIN SERPL BCG-MCNC: 4.4 G/DL (ref 3.5–5)
ALP SERPL-CCNC: 47 U/L (ref 34–104)
ALT SERPL W P-5'-P-CCNC: 19 U/L (ref 7–52)
ANION GAP SERPL CALCULATED.3IONS-SCNC: 4 MMOL/L (ref 4–13)
AST SERPL W P-5'-P-CCNC: 19 U/L (ref 13–39)
BASOPHILS # BLD AUTO: 0.01 THOUSANDS/ÂΜL (ref 0–0.1)
BASOPHILS NFR BLD AUTO: 0 % (ref 0–1)
BILIRUB SERPL-MCNC: 0.54 MG/DL (ref 0.2–1)
BUN SERPL-MCNC: 14 MG/DL (ref 5–25)
CALCIUM SERPL-MCNC: 9 MG/DL (ref 8.4–10.2)
CHLORIDE SERPL-SCNC: 106 MMOL/L (ref 96–108)
CHOLEST SERPL-MCNC: 151 MG/DL (ref ?–200)
CO2 SERPL-SCNC: 27 MMOL/L (ref 21–32)
CREAT SERPL-MCNC: 0.83 MG/DL (ref 0.6–1.3)
EOSINOPHIL # BLD AUTO: 0.19 THOUSAND/ÂΜL (ref 0–0.61)
EOSINOPHIL NFR BLD AUTO: 4 % (ref 0–6)
ERYTHROCYTE [DISTWIDTH] IN BLOOD BY AUTOMATED COUNT: 12.6 % (ref 11.6–15.1)
GFR SERPL CREATININE-BSD FRML MDRD: 99 ML/MIN/1.73SQ M
GLUCOSE P FAST SERPL-MCNC: 109 MG/DL (ref 65–99)
HCT VFR BLD AUTO: 42.2 % (ref 36.5–49.3)
HDLC SERPL-MCNC: 47 MG/DL
HGB BLD-MCNC: 14.5 G/DL (ref 12–17)
IMM GRANULOCYTES # BLD AUTO: 0.01 THOUSAND/UL (ref 0–0.2)
IMM GRANULOCYTES NFR BLD AUTO: 0 % (ref 0–2)
LDLC SERPL CALC-MCNC: 89 MG/DL (ref 0–100)
LYMPHOCYTES # BLD AUTO: 1.2 THOUSANDS/ÂΜL (ref 0.6–4.47)
LYMPHOCYTES NFR BLD AUTO: 23 % (ref 14–44)
MCH RBC QN AUTO: 31.5 PG (ref 26.8–34.3)
MCHC RBC AUTO-ENTMCNC: 34.4 G/DL (ref 31.4–37.4)
MCV RBC AUTO: 92 FL (ref 82–98)
MONOCYTES # BLD AUTO: 0.47 THOUSAND/ÂΜL (ref 0.17–1.22)
MONOCYTES NFR BLD AUTO: 9 % (ref 4–12)
NEUTROPHILS # BLD AUTO: 3.33 THOUSANDS/ÂΜL (ref 1.85–7.62)
NEUTS SEG NFR BLD AUTO: 64 % (ref 43–75)
NONHDLC SERPL-MCNC: 104 MG/DL
NRBC BLD AUTO-RTO: 0 /100 WBCS
PLATELET # BLD AUTO: 117 THOUSANDS/UL (ref 149–390)
PMV BLD AUTO: 11.6 FL (ref 8.9–12.7)
POTASSIUM SERPL-SCNC: 4.1 MMOL/L (ref 3.5–5.3)
PROT SERPL-MCNC: 7.1 G/DL (ref 6.4–8.4)
PSA SERPL-MCNC: 0.29 NG/ML (ref 0–4)
RBC # BLD AUTO: 4.61 MILLION/UL (ref 3.88–5.62)
SODIUM SERPL-SCNC: 137 MMOL/L (ref 135–147)
TRIGL SERPL-MCNC: 77 MG/DL (ref ?–150)
WBC # BLD AUTO: 5.21 THOUSAND/UL (ref 4.31–10.16)

## 2025-06-13 PROCEDURE — 85025 COMPLETE CBC W/AUTO DIFF WBC: CPT

## 2025-06-13 PROCEDURE — 80061 LIPID PANEL: CPT

## 2025-06-13 PROCEDURE — 36415 COLL VENOUS BLD VENIPUNCTURE: CPT

## 2025-06-13 PROCEDURE — 80053 COMPREHEN METABOLIC PANEL: CPT

## 2025-06-13 PROCEDURE — G0103 PSA SCREENING: HCPCS

## 2025-06-16 DIAGNOSIS — F51.01 PRIMARY INSOMNIA: ICD-10-CM

## 2025-06-16 RX ORDER — ZOLPIDEM TARTRATE 10 MG/1
10 TABLET ORAL
Qty: 30 TABLET | Refills: 0 | Status: SHIPPED | OUTPATIENT
Start: 2025-06-16

## 2025-06-16 NOTE — TELEPHONE ENCOUNTER
1 [id number] 04/29/2025 04/28/2025 04/28/2025 Zolpidem Tartrate (Tablet) 30.0 30 10 MG NA [facility]O SHEA [facility] Commercial Insurance 0 / 0 [place]    1 [id number] 03/03/2025 02/27/2025 02/27/2025 Zolpidem Tartrate (Tablet) 30.0 30 10 MG NA MINGO SHEA [facility] Commercial [place] 0 / 0 [place]    1 [id number] 01/05/2025 01/03/2025 01/03/2025 Zolpidem Tartrate (Tablet) 30.0 30 10 MG NA MINGO SHEA [facility] Commercial [place] 0 / 1 [place]    1 [id number] 11/08/2024 11/05/2024 11/05/2024 Zolpidem Tartrate (Tablet) 30.0 30 10 MG MINGO THURMAN [facility] [place] 0 / 0 [place]    1 [id number] 09/17/2024 09/16/2024 09/16/2024 Zolpidem Tartrate (Tablet) 30.0 30 10 MG MINGO THURMAN [facility] Commercial Insurance 0 / 0 [place]    1 [id number] 08/16/2024 08/16/2024 08/13/2024 Zolpidem Tartrate (Tablet) 30.0 30 10 MG DOMITILA POSADAS [facility] Commercial Insurance 0 / 0 [place]    1 [id number] 07/19/2024 07/18/2024 07/18/2024 Zolpidem Tartrate (Tablet) 30.0 30 10 MG MINGO THURMAN [facility] Commercial Insurance 0 / 0 [place]

## 2025-06-16 NOTE — TELEPHONE ENCOUNTER
Refill must be reviewed and completed by the office or provider. The refill is unable to be approved or denied by the medication management team.    Cannot be delegated      none

## 2025-06-16 NOTE — TELEPHONE ENCOUNTER
Reason for call:   [x] Refill   [] Prior Auth  [x] Other: patient leaving for vacation Thursday June 19     Office:   [x] PCP/Provider - : Jurgen Mcqueen, /Cait Byrne   [] Specialty/Provider -     Medication:     zolpidem (AMBIEN) 10 mg tablet       Dose/Frequency:  Take 1 tablet (10 mg total) by mouth daily at bedtime as needed for sleep,     Quantity: 90    Pharmacy: [id number] [place], [place] - [address] [facility]   Does the patient have enough for 3 days?   [] Yes   [x] No - Send as HP to POD

## 2025-06-18 ENCOUNTER — OFFICE VISIT (OUTPATIENT)
Age: 55
End: 2025-06-18
Payer: COMMERCIAL

## 2025-06-18 VITALS
HEART RATE: 62 BPM | HEIGHT: 74 IN | DIASTOLIC BLOOD PRESSURE: 66 MMHG | BODY MASS INDEX: 30.67 KG/M2 | SYSTOLIC BLOOD PRESSURE: 104 MMHG | OXYGEN SATURATION: 99 % | WEIGHT: 239 LBS

## 2025-06-18 DIAGNOSIS — Z95.1 S/P CABG X 4: Primary | ICD-10-CM

## 2025-06-18 DIAGNOSIS — I10 PRIMARY HYPERTENSION: ICD-10-CM

## 2025-06-18 DIAGNOSIS — R06.09 DYSPNEA ON EXERTION: ICD-10-CM

## 2025-06-18 DIAGNOSIS — I25.10 CORONARY ARTERY DISEASE INVOLVING NATIVE CORONARY ARTERY OF NATIVE HEART WITHOUT ANGINA PECTORIS: ICD-10-CM

## 2025-06-18 PROCEDURE — 99213 OFFICE O/P EST LOW 20 MIN: CPT | Performed by: INTERNAL MEDICINE

## 2025-06-18 NOTE — PROGRESS NOTES
Cardiology Follow Up Visit    Jose Nix  1970  [id number]  [facility] [place]  [address]  [address]  [facility] [id number] [phone]    1. S/P CABG x 4 ( Saphenous vein graft to diagonal 1, saphenous vein graft to OM3, radial to ramus intermedius, LIMA to LAD) 2012  Evolocumab 140 MG/ML SOAJ    Echo      2. Primary hypertension        3. Coronary artery disease involving native coronary artery of native heart without angina pectoris  Evolocumab 140 MG/ML SOAJ    Echo      4. Dyspnea on exertion  Echo              Discussion/Summary:    1. CAD  A. CABG x 4 2012, after presenting with new onset angina and abnormal stress, nm stress surveillance normal 2020, normal lvef     2.  Hyperlipidemia on rosuva  20mg, LDL still 89         Plan:      She did not states heart rate at times is in the 40s.  Blood pressure on low side.  Told him he could stop his Toprol-XL 25 mg.  Can take every other day for the next 2 weeks and stop.  Given young age, and calcium score, prior bypass grafting will try to submit for PCSK9 inhibitor therapy to achieve LDL of less than 70.  Echocardiogram ordered, last 1/20/2019    Interval History:    55-year-old male history of CABG x4 2012 after presenting with new onset exertional chest discomfort and abnormal stress test which prompted cardiac catheterization.    He has been doing well since.    Insurance did not cover Repatha in the past.  They required a calcium score and it was performed that is over 300..    Blood pressure has been well controlled.  Notices heart rate in the 40s at times.    Feels well, no complaints.  Does play tennis 3 times a week.  He is asymptomatic.    LDL recently 89-1 01.  Most recently, 89.    Going to [place] on a cruise this summer.      Patient Active Problem List   Diagnosis    S/P CABG x 4 ( Saphenous vein graft to diagonal 1, saphenous vein graft to OM3,  "radial to ramus intermedius, LIMA to LAD) 2012    Hypertension    Arteriosclerotic cardiovascular disease    Hyperlipidemia    Insomnia    Major depressive disorder with single episode, in partial remission (HCC)    Memory change    Palpitations    Acute pain of right shoulder    Change in vision    Foot mass, left    Postoperative state    Cervicalgia     Past Medical History:   Diagnosis Date    Acute angina (HCC)     per pt \"no recent episode--anxiety attack approx 4 years ago\"    Allergic     seasonal    Chronic ITP (idiopathic thrombocytopenia) (HCC)     Coronary artery disease      cardiology- Dr Her    Dental crown present     and Veneers    Family history of diabetes mellitus in mother     \"pt does not know biological father or the history\"    High blood pressure     NOT HYPERTENSION    History of heart murmur in childhood     Hyperlipidemia     Low platelet count (HCC)     no longer seeing a hematologist    Shortness of breath     \"generally with activity\"    ST elevation (STEMI) myocardial infarction of unspecified site (HCC)     per pt \"technically no heart attack\"    Valvular heart disease     Wears glasses      Social History     Socioeconomic History    Marital status: /Civil Union     Spouse name: Not on file    Number of children: 2    Years of education: Not on file    Highest education level: Not on file   Occupational History    Occupation: TRAVEL    Tobacco Use    Smoking status: Never     Passive exposure: Past    Smokeless tobacco: Never   Vaping Use    Vaping status: Never Used   Substance and Sexual Activity    Alcohol use: Yes     Alcohol/week: 3.0 standard drinks of alcohol     Types: 3 Cans of beer per week     Comment: BEING A SOCIAL DRINKER: <= 3 DRINK/WEEK    Drug use: Never    Sexual activity: Not on file     Comment: defer   Other Topics Concern    Not on file   Social History Narrative    NO DAILY COFFEE CONSUMPTION (___CUPS/DAY)    NO DAILY COLA " CONSUMPTION (___CANS/DAY)    DAILY TEA CONSUMPTION (___CUPS/DAY)     Social Drivers of Health     Financial Resource Strain: Not on file   Food Insecurity: Not on file   Transportation Needs: Not on file   Physical Activity: Not on file   Stress: Not on file   Social Connections: Not on file   Intimate Partner Violence: Not on file   Housing Stability: Not on file      Family History   Problem Relation Name Age of Onset    Coronary artery disease Mother Merari Deluna     Heart disease Mother Merari Deluna     Hypertension Mother Merari Deluan      Past Surgical History:   Procedure Laterality Date    CARDIAC SURGERY      OPEN HEART QUAD BYPASS; RESOLVED: 2012    CORONARY ANGIOPLASTY  02/21/2012    CORONARY ANGIOGRAPHY WITHOUT CONCOMITANT LEFT HEART CATHETERIZATION; 90% PROXIMAL LAD, 90% 1ST DIAG OSTIUM AND 95% PROXIMAL THIRD, 99% PROXIMAL RAMUS INTERMEDIUS    CORONARY ANGIOPLASTY WITH STENT PLACEMENT  02/21/2012    4 VESSEL DISEASE TO HAVE CABG; MANAGED BY: SHABBIR SNYDER    CORONARY ARTERY BYPASS GRAFT      LAST ASSESSED: 11/17/17--metal wires in sternum for closure    CORONARY ARTERY BYPASS GRAFT  02/29/2012    CABG X4 (SVG TO 1ST DIAGNOL, SVG TO 3RD OBTUSE MARGINAL, FREE RADIAL ARTERY TO RAMUS INTERMEDIUS, SULLIVAN TO LAD) BY DR. GUTIERREZ 2012     FOOT SURGERY Right     bone spur    MD PRTL EXC B1 TARSAL/METAR B1 XCP TALUS/CALCANEUS Left 3/8/2024    Procedure: EXCISION EXOSTOSIS LEFT FOOT WITH EXCISION OF ADVENTITIOUS BURSA;  Surgeon: Khoa Smith DPM;  Location: [facility];  Service: Podiatry    TONSILLECTOMY      VASECTOMY  10/27/2015    SURGERY VAS DEFERENS; MANAGED BY: LEILANI HEREDIA; LAST ASSESSED: 10/27/15    WISDOM TOOTH EXTRACTION         Current Outpatient Medications:     aspirin 81 mg chewable tablet, Chew 1 tablet in the morning., Disp: , Rfl:     Evolocumab 140 MG/ML SOAJ, Inject 1 mL (140 mg total) under the skin every 14 (fourteen) days, Disp: 6 mL, Rfl: 3    metoprolol succinate (TOPROL-XL)  25 mg 24 hr tablet, TAKE 1 TABLET DAILY, Disp: 90 tablet, Rfl: 3    rosuvastatin (CRESTOR) 20 MG tablet, TAKE 1 TABLET DAILY, Disp: 90 tablet, Rfl: 3    zolpidem (AMBIEN) 10 mg tablet, Take 1 tablet (10 mg total) by mouth daily at bedtime as needed for sleep (Patient taking differently: Take 5 mg by mouth daily at bedtime as needed for sleep Takes 5 mg as needed. Only uses half.), Disp: 30 tablet, Rfl: 0    Acetaminophen 500 MG, Take by mouth every 6 (six) hours as needed for mild pain (Patient not taking: Reported on 6/18/2025), Disp: , Rfl:     fluticasone (FLONASE) 50 mcg/act nasal spray, 1 spray into each nostril daily (Patient not taking: Reported on 6/18/2025), Disp: 16 g, Rfl: 0    Multiple Vitamins-Minerals (MULTIVITAMIN WITH MINERALS) tablet, Take 2 tablets by mouth daily at bedtime (Patient not taking: Reported on 6/18/2025), Disp: , Rfl:   No Known Allergies      Social, Family, Medication history reviewed and updated as necessary      Labs:     Lab Results   Component Value Date     09/19/2015    K 4.1 06/13/2025     06/13/2025    CO2 27 06/13/2025    BUN 14 06/13/2025    CREATININE 0.83 06/13/2025    CREATININE 0.88 09/23/2024    GLUCOSE 82 09/19/2015    CALCIUM 9.0 06/13/2025       Lab Results   Component Value Date    WBC 5.21 06/13/2025    HGB 14.5 06/13/2025    HGB 14.8 09/23/2024    HCT 42.2 06/13/2025    HCT 43.6 09/23/2024     (L) 06/13/2025     (L) 09/23/2024       Lab Results   Component Value Date    CHOL 129 01/21/2015     Lab Results   Component Value Date    HDL 47 06/13/2025    HDL 41 09/18/2024     Lab Results   Component Value Date    LDLCALC 89 06/13/2025    LDLCALC 75 09/18/2024     Lab Results   Component Value Date    LDLDIRECT 86 09/18/2024    LDLDIRECT 101 (H) 01/26/2024             Lab Results   Component Value Date    TRIG 77 06/13/2025    TRIG 171 (H) 09/18/2024       Lab Results   Component Value Date    ALT 19 06/13/2025    ALT 19 09/23/2024    AST 19  06/13/2025    AST 19 09/23/2024    ALKPHOS 47 06/13/2025    ALKPHOS 46 09/23/2024       Lab Results   Component Value Date    INR 0.94 07/15/2021    INR 0.95 09/08/2020       Lab Results   Component Value Date    NTBNP 23 07/15/2021       Lab Results   Component Value Date    HGBA1C 5.3 02/03/2020           Imaging: Reviewed in epic        Review of Systems:  14 systems reviewed and negative with exception of the above        PHYSICAL EXAM:      Vitals:    06/18/25 1309   BP: 104/66   Pulse: 62   SpO2: 99%         Body mass index is 30.69 kg/m².   Weight (last 2 days)       Date/Time Weight    06/18/25 1309 108 (239)              Gen: No acute distress  HEENT: anicteric, mucous membranes moist  Neck: supple, no jugular venous distention, or carotid bruit  Heart: regular, normal s1 and s2, no murmur/rub or gallop  Lungs :clear to auscultation bilaterally, no rales/rhonchi or wheeze  Abdomen: soft nontender, normoactive bowel sounds, no organomegaly  Ext: warm and perfused, normal femoral pulses, no edema, or clubbing  Skin: warm, no rashes  Neuro: AAO x 3, no focal findings  Psychiatric: normal affect  Musculoskeletal: no obvious joint deformities.        This note was completed in part utilizing [employer] direct voice recognition software.   Grammatical errors, random word insertion, spelling mistakes, and incomplete sentences may be an occasional consequence of the system secondary to software limitations, ambient noise and hardware issues. At the time of dictation, efforts were made to edit, clarify and /or correct errors.  Please read the chart carefully and recognize, using context, where substitutions have occurred.  If you have any questions or concerns about the context, text or information contained within the body of this dictation, please contact myself, the provider, for further clarification.

## 2025-07-11 ENCOUNTER — IOP CHECK (OUTPATIENT)
Dept: URBAN - METROPOLITAN AREA CLINIC 6 | Facility: CLINIC | Age: 55
End: 2025-07-11

## 2025-07-11 DIAGNOSIS — H40.023: ICD-10-CM

## 2025-07-11 DIAGNOSIS — H25.13: ICD-10-CM

## 2025-07-11 PROCEDURE — 92012 INTRM OPH EXAM EST PATIENT: CPT

## 2025-07-11 PROCEDURE — 92083 EXTENDED VISUAL FIELD XM: CPT

## 2025-07-11 ASSESSMENT — VISUAL ACUITY
OD_CC: 20/20
OS_CC: 20/20

## 2025-07-11 ASSESSMENT — TONOMETRY
OD_IOP_MMHG: 18
OD_IOP_MMHG: 15
OS_IOP_MMHG: 16
OS_IOP_MMHG: 17

## 2025-08-19 ENCOUNTER — TELEPHONE (OUTPATIENT)
Dept: CARDIAC SURGERY | Facility: CLINIC | Age: 55
End: 2025-08-19

## 2025-08-19 DIAGNOSIS — Z95.1 S/P CABG X 4: ICD-10-CM

## 2025-08-19 DIAGNOSIS — I25.10 CORONARY ARTERY DISEASE INVOLVING NATIVE CORONARY ARTERY OF NATIVE HEART WITHOUT ANGINA PECTORIS: ICD-10-CM

## (undated) DEVICE — NEEDLE 25G X 1 1/2

## (undated) DEVICE — SUT VICRYL 3-0 SH 27 IN J416H

## (undated) DEVICE — 10FR FRAZIER SUCTION HANDLE: Brand: CARDINAL HEALTH

## (undated) DEVICE — STERILE BETHLEHEM PLASTIC HAND: Brand: CARDINAL HEALTH

## (undated) DEVICE — CUFF TOURNIQUET 18 X 5.5 IN QUICK CONNECT 2BLA

## (undated) DEVICE — PAD GROUNDING DUAL ADULT

## (undated) DEVICE — SUT ETHILON 4-0 PS-2 18 IN 1667H

## (undated) DEVICE — SUT VICRYL 4-0 SH 27 IN J415H

## (undated) DEVICE — PAD CAST 4 IN COTTON NON STERILE

## (undated) DEVICE — Device

## (undated) DEVICE — INTENDED FOR TISSUE SEPARATION, AND OTHER PROCEDURES THAT REQUIRE A SHARP SURGICAL BLADE TO PUNCTURE OR CUT.: Brand: BARD-PARKER ® CARBON RIB-BACK BLADES

## (undated) DEVICE — U-DRAPE: Brand: CONVERTORS

## (undated) DEVICE — GLOVE INDICATOR PI UNDERGLOVE SZ 7.5 BLUE

## (undated) DEVICE — GLOVE SRG BIOGEL 7.5

## (undated) DEVICE — TUBING SUCTION 5MM X 12 FT

## (undated) DEVICE — PENCIL ELECTROSURG E-Z CLEAN -0035H

## (undated) DEVICE — SUT ETHILON 3-0 FS-1 18 IN 663G

## (undated) DEVICE — SYRINGE 10ML LL